# Patient Record
Sex: FEMALE | Race: WHITE | Employment: OTHER | URBAN - METROPOLITAN AREA
[De-identification: names, ages, dates, MRNs, and addresses within clinical notes are randomized per-mention and may not be internally consistent; named-entity substitution may affect disease eponyms.]

---

## 2017-01-06 ENCOUNTER — OFFICE VISIT (OUTPATIENT)
Dept: FAMILY MEDICINE CLINIC | Age: 72
End: 2017-01-06

## 2017-01-06 VITALS
BODY MASS INDEX: 20.61 KG/M2 | HEART RATE: 91 BPM | WEIGHT: 136 LBS | OXYGEN SATURATION: 98 % | HEIGHT: 68 IN | DIASTOLIC BLOOD PRESSURE: 62 MMHG | SYSTOLIC BLOOD PRESSURE: 102 MMHG

## 2017-01-06 DIAGNOSIS — H60.392 INFECTION OF EAR LOBE, LEFT: ICD-10-CM

## 2017-01-06 DIAGNOSIS — E78.00 HYPERCHOLESTEROLEMIA: ICD-10-CM

## 2017-01-06 DIAGNOSIS — M47.816 LUMBAR FACET ARTHROPATHY: ICD-10-CM

## 2017-01-06 DIAGNOSIS — R10.32 ABDOMINAL PAIN, LEFT LOWER QUADRANT: ICD-10-CM

## 2017-01-06 DIAGNOSIS — L91.8 ACROCHORDON: ICD-10-CM

## 2017-01-06 DIAGNOSIS — R10.10 PAIN OF UPPER ABDOMEN: Primary | ICD-10-CM

## 2017-01-06 DIAGNOSIS — E55.9 VITAMIN D DEFICIENCY: ICD-10-CM

## 2017-01-06 DIAGNOSIS — D18.01 CHERRY ANGIOMA: ICD-10-CM

## 2017-01-06 DIAGNOSIS — Z23 NEED FOR VACCINATION: ICD-10-CM

## 2017-01-06 DIAGNOSIS — R73.01 IMPAIRED FASTING GLUCOSE: ICD-10-CM

## 2017-01-06 DIAGNOSIS — M77.8 TENDONITIS OF FINGER: ICD-10-CM

## 2017-01-06 DIAGNOSIS — R19.4 CHANGE IN BOWEL HABIT: ICD-10-CM

## 2017-01-06 DIAGNOSIS — R10.10 PAIN OF UPPER ABDOMEN: ICD-10-CM

## 2017-01-06 LAB
A/G RATIO: 2.2 (ref 1.1–2.2)
ALBUMIN SERPL-MCNC: 4.9 G/DL (ref 3.4–5)
ALP BLD-CCNC: 63 U/L (ref 40–129)
ALT SERPL-CCNC: 14 U/L (ref 10–40)
ANION GAP SERPL CALCULATED.3IONS-SCNC: 18 MMOL/L (ref 3–16)
AST SERPL-CCNC: 19 U/L (ref 15–37)
BILIRUB SERPL-MCNC: 0.6 MG/DL (ref 0–1)
BUN BLDV-MCNC: 11 MG/DL (ref 7–20)
CALCIUM SERPL-MCNC: 9.8 MG/DL (ref 8.3–10.6)
CHLORIDE BLD-SCNC: 102 MMOL/L (ref 99–110)
CHOLESTEROL, TOTAL: 246 MG/DL (ref 0–199)
CO2: 25 MMOL/L (ref 21–32)
CREAT SERPL-MCNC: 0.6 MG/DL (ref 0.6–1.2)
GFR AFRICAN AMERICAN: >60
GFR NON-AFRICAN AMERICAN: >60
GLOBULIN: 2.2 G/DL
GLUCOSE BLD-MCNC: 107 MG/DL (ref 70–99)
HCT VFR BLD CALC: 41.4 % (ref 36–48)
HDLC SERPL-MCNC: 61 MG/DL (ref 40–60)
HEMOGLOBIN: 13.5 G/DL (ref 12–16)
LDL CHOLESTEROL CALCULATED: 150 MG/DL
MCH RBC QN AUTO: 30.3 PG (ref 26–34)
MCHC RBC AUTO-ENTMCNC: 32.7 G/DL (ref 31–36)
MCV RBC AUTO: 92.5 FL (ref 80–100)
PDW BLD-RTO: 13.8 % (ref 12.4–15.4)
PLATELET # BLD: 191 K/UL (ref 135–450)
PMV BLD AUTO: 7.6 FL (ref 5–10.5)
POTASSIUM SERPL-SCNC: 4.9 MMOL/L (ref 3.5–5.1)
RBC # BLD: 4.47 M/UL (ref 4–5.2)
SODIUM BLD-SCNC: 145 MMOL/L (ref 136–145)
TOTAL PROTEIN: 7.1 G/DL (ref 6.4–8.2)
TRIGL SERPL-MCNC: 176 MG/DL (ref 0–150)
VITAMIN D 25-HYDROXY: 50 NG/ML
VLDLC SERPL CALC-MCNC: 35 MG/DL
WBC # BLD: 4.3 K/UL (ref 4–11)

## 2017-01-06 PROCEDURE — 99214 OFFICE O/P EST MOD 30 MIN: CPT | Performed by: FAMILY MEDICINE

## 2017-01-06 PROCEDURE — G0008 ADMIN INFLUENZA VIRUS VAC: HCPCS | Performed by: FAMILY MEDICINE

## 2017-01-06 PROCEDURE — 90662 IIV NO PRSV INCREASED AG IM: CPT | Performed by: FAMILY MEDICINE

## 2017-01-06 RX ORDER — DICYCLOMINE HCL 20 MG
20 TABLET ORAL
Qty: 90 TABLET | Refills: 1 | Status: SHIPPED | OUTPATIENT
Start: 2017-01-06 | End: 2017-02-01 | Stop reason: ALTCHOICE

## 2017-01-06 ASSESSMENT — ENCOUNTER SYMPTOMS
EYE DISCHARGE: 0
DIARRHEA: 1
ABDOMINAL PAIN: 1
COLOR CHANGE: 1
SHORTNESS OF BREATH: 0
BACK PAIN: 1
NAUSEA: 1
EYE REDNESS: 0
BOWEL INCONTINENCE: 0

## 2017-01-13 ENCOUNTER — HOSPITAL ENCOUNTER (OUTPATIENT)
Dept: CT IMAGING | Age: 72
Discharge: HOME | End: 2017-01-13
Attending: FAMILY MEDICINE | Admitting: FAMILY MEDICINE

## 2017-01-13 DIAGNOSIS — R10.10 PAIN OF UPPER ABDOMEN: ICD-10-CM

## 2017-01-13 DIAGNOSIS — R10.32 ABDOMINAL PAIN, LEFT LOWER QUADRANT: ICD-10-CM

## 2017-01-13 DIAGNOSIS — R10.10 UPPER ABDOMINAL PAIN: ICD-10-CM

## 2017-01-13 DIAGNOSIS — R19.4 CHANGE IN BOWEL HABIT: ICD-10-CM

## 2017-01-13 RX ORDER — METRONIDAZOLE 500 MG/1
500 TABLET ORAL 3 TIMES DAILY
Qty: 30 TABLET | Refills: 0 | Status: SHIPPED | OUTPATIENT
Start: 2017-01-13 | End: 2017-01-23

## 2017-01-13 RX ORDER — CIPROFLOXACIN 500 MG/1
500 TABLET, FILM COATED ORAL 2 TIMES DAILY
Qty: 20 TABLET | Refills: 0 | Status: SHIPPED | OUTPATIENT
Start: 2017-01-13 | End: 2017-01-23

## 2017-02-01 ENCOUNTER — OFFICE VISIT (OUTPATIENT)
Dept: FAMILY MEDICINE CLINIC | Age: 72
End: 2017-02-01

## 2017-02-01 VITALS
HEART RATE: 85 BPM | OXYGEN SATURATION: 98 % | BODY MASS INDEX: 20.46 KG/M2 | DIASTOLIC BLOOD PRESSURE: 64 MMHG | HEIGHT: 68 IN | WEIGHT: 135 LBS | SYSTOLIC BLOOD PRESSURE: 104 MMHG

## 2017-02-01 DIAGNOSIS — K80.11: ICD-10-CM

## 2017-02-01 DIAGNOSIS — R10.32 LEFT LOWER QUADRANT PAIN: Primary | ICD-10-CM

## 2017-02-01 DIAGNOSIS — I70.90 ATHEROSCLEROSIS: ICD-10-CM

## 2017-02-01 DIAGNOSIS — K63.9 COLON WALL THICKENING: ICD-10-CM

## 2017-02-01 PROCEDURE — G8427 DOCREV CUR MEDS BY ELIG CLIN: HCPCS | Performed by: FAMILY MEDICINE

## 2017-02-01 PROCEDURE — 99214 OFFICE O/P EST MOD 30 MIN: CPT | Performed by: FAMILY MEDICINE

## 2017-02-01 PROCEDURE — G8400 PT W/DXA NO RESULTS DOC: HCPCS | Performed by: FAMILY MEDICINE

## 2017-02-01 PROCEDURE — 1090F PRES/ABSN URINE INCON ASSESS: CPT | Performed by: FAMILY MEDICINE

## 2017-02-01 PROCEDURE — 3017F COLORECTAL CA SCREEN DOC REV: CPT | Performed by: FAMILY MEDICINE

## 2017-02-01 PROCEDURE — 1036F TOBACCO NON-USER: CPT | Performed by: FAMILY MEDICINE

## 2017-02-01 PROCEDURE — 1123F ACP DISCUSS/DSCN MKR DOCD: CPT | Performed by: FAMILY MEDICINE

## 2017-02-01 PROCEDURE — G8484 FLU IMMUNIZE NO ADMIN: HCPCS | Performed by: FAMILY MEDICINE

## 2017-02-01 PROCEDURE — 3014F SCREEN MAMMO DOC REV: CPT | Performed by: FAMILY MEDICINE

## 2017-02-01 PROCEDURE — G8419 CALC BMI OUT NRM PARAM NOF/U: HCPCS | Performed by: FAMILY MEDICINE

## 2017-02-01 PROCEDURE — 4040F PNEUMOC VAC/ADMIN/RCVD: CPT | Performed by: FAMILY MEDICINE

## 2017-02-01 RX ORDER — DICYCLOMINE HCL 20 MG
20 TABLET ORAL
Qty: 90 TABLET | Refills: 1 | Status: SHIPPED | OUTPATIENT
Start: 2017-02-01 | End: 2017-03-16 | Stop reason: ALTCHOICE

## 2017-02-01 ASSESSMENT — ENCOUNTER SYMPTOMS
COLOR CHANGE: 0
SHORTNESS OF BREATH: 0
CONSTIPATION: 0
ABDOMINAL PAIN: 1
DIARRHEA: 1
EYE REDNESS: 0
BLOOD IN STOOL: 0
VOMITING: 0
NAUSEA: 0
ABDOMINAL DISTENTION: 0
RECTAL PAIN: 0
EYE DISCHARGE: 0

## 2017-02-21 ENCOUNTER — HOSPITAL ENCOUNTER (OUTPATIENT)
Dept: SURGERY | Age: 72
Discharge: HOME | End: 2017-02-21
Attending: INTERNAL MEDICINE | Admitting: INTERNAL MEDICINE

## 2017-02-21 VITALS
OXYGEN SATURATION: 99 % | SYSTOLIC BLOOD PRESSURE: 110 MMHG | TEMPERATURE: 97.4 F | DIASTOLIC BLOOD PRESSURE: 61 MMHG | BODY MASS INDEX: 20.46 KG/M2 | WEIGHT: 135 LBS | RESPIRATION RATE: 16 BRPM | HEART RATE: 109 BPM | HEIGHT: 68 IN

## 2017-02-21 DIAGNOSIS — R19.5 OTHER FECAL ABNORMALITIES: ICD-10-CM

## 2017-02-21 LAB
GLUCOSE BLD-MCNC: 95 MG/DL (ref 70–99)
PERFORMED ON: NORMAL

## 2017-02-21 PROCEDURE — 93010 ELECTROCARDIOGRAM REPORT: CPT | Performed by: INTERNAL MEDICINE

## 2017-02-21 RX ORDER — SODIUM CHLORIDE, SODIUM LACTATE, POTASSIUM CHLORIDE, CALCIUM CHLORIDE 600; 310; 30; 20 MG/100ML; MG/100ML; MG/100ML; MG/100ML
INJECTION, SOLUTION INTRAVENOUS CONTINUOUS
Status: DISCONTINUED | OUTPATIENT
Start: 2017-02-21 | End: 2017-02-22 | Stop reason: HOSPADM

## 2017-02-21 RX ORDER — LIDOCAINE HYDROCHLORIDE 10 MG/ML
0.1 INJECTION, SOLUTION EPIDURAL; INFILTRATION; INTRACAUDAL; PERINEURAL ONCE
Status: DISCONTINUED | OUTPATIENT
Start: 2017-02-21 | End: 2017-02-22 | Stop reason: HOSPADM

## 2017-02-21 RX ADMIN — SODIUM CHLORIDE, SODIUM LACTATE, POTASSIUM CHLORIDE, CALCIUM CHLORIDE: 600; 310; 30; 20 INJECTION, SOLUTION INTRAVENOUS at 12:11

## 2017-02-21 ASSESSMENT — PAIN SCALES - GENERAL
PAINLEVEL_OUTOF10: 0

## 2017-02-21 ASSESSMENT — PAIN - FUNCTIONAL ASSESSMENT: PAIN_FUNCTIONAL_ASSESSMENT: 0-10

## 2017-02-22 LAB
EKG ATRIAL RATE: 107 BPM
EKG DIAGNOSIS: NORMAL
EKG P AXIS: 19 DEGREES
EKG P-R INTERVAL: 122 MS
EKG Q-T INTERVAL: 350 MS
EKG QRS DURATION: 68 MS
EKG QTC CALCULATION (BAZETT): 467 MS
EKG R AXIS: 6 DEGREES
EKG T AXIS: 1 DEGREES
EKG VENTRICULAR RATE: 107 BPM

## 2017-03-09 ENCOUNTER — HOSPITAL ENCOUNTER (OUTPATIENT)
Dept: NUCLEAR MEDICINE | Age: 72
Discharge: HOME | End: 2017-03-09
Attending: INTERNAL MEDICINE | Admitting: INTERNAL MEDICINE

## 2017-03-09 VITALS — WEIGHT: 129 LBS | BODY MASS INDEX: 20.25 KG/M2 | HEIGHT: 67 IN

## 2017-03-09 DIAGNOSIS — K80.20 CALCULUS OF GALLBLADDER WITHOUT CHOLECYSTITIS WITHOUT OBSTRUCTION: ICD-10-CM

## 2017-03-09 DIAGNOSIS — R11.0 NAUSEA: ICD-10-CM

## 2017-03-09 DIAGNOSIS — R63.4 WEIGHT LOSS: ICD-10-CM

## 2017-03-09 RX ADMIN — Medication 6 MILLICURIE: at 08:15

## 2017-03-16 ENCOUNTER — SURG/PROC ORDERS (OUTPATIENT)
Dept: SURGERY | Age: 72
End: 2017-03-16

## 2017-03-16 ENCOUNTER — INITIAL CONSULT (OUTPATIENT)
Dept: SURGERY | Age: 72
End: 2017-03-16

## 2017-03-16 VITALS
WEIGHT: 133 LBS | SYSTOLIC BLOOD PRESSURE: 136 MMHG | BODY MASS INDEX: 20.16 KG/M2 | HEIGHT: 68 IN | DIASTOLIC BLOOD PRESSURE: 80 MMHG

## 2017-03-16 DIAGNOSIS — K80.10 CALCULUS OF GALLBLADDER WITH CHRONIC CHOLECYSTITIS WITHOUT OBSTRUCTION: Primary | ICD-10-CM

## 2017-03-16 PROCEDURE — G8484 FLU IMMUNIZE NO ADMIN: HCPCS | Performed by: SURGERY

## 2017-03-16 PROCEDURE — 1090F PRES/ABSN URINE INCON ASSESS: CPT | Performed by: SURGERY

## 2017-03-16 PROCEDURE — G8427 DOCREV CUR MEDS BY ELIG CLIN: HCPCS | Performed by: SURGERY

## 2017-03-16 PROCEDURE — G8419 CALC BMI OUT NRM PARAM NOF/U: HCPCS | Performed by: SURGERY

## 2017-03-16 PROCEDURE — 1036F TOBACCO NON-USER: CPT | Performed by: SURGERY

## 2017-03-16 PROCEDURE — G8400 PT W/DXA NO RESULTS DOC: HCPCS | Performed by: SURGERY

## 2017-03-16 PROCEDURE — 3017F COLORECTAL CA SCREEN DOC REV: CPT | Performed by: SURGERY

## 2017-03-16 PROCEDURE — 99203 OFFICE O/P NEW LOW 30 MIN: CPT | Performed by: SURGERY

## 2017-03-16 PROCEDURE — 1123F ACP DISCUSS/DSCN MKR DOCD: CPT | Performed by: SURGERY

## 2017-03-16 PROCEDURE — 4040F PNEUMOC VAC/ADMIN/RCVD: CPT | Performed by: SURGERY

## 2017-03-16 PROCEDURE — 3014F SCREEN MAMMO DOC REV: CPT | Performed by: SURGERY

## 2017-03-16 RX ORDER — ONDANSETRON 4 MG/1
4 TABLET, FILM COATED ORAL EVERY 6 HOURS PRN
Qty: 40 TABLET | Refills: 1 | Status: SHIPPED | OUTPATIENT
Start: 2017-03-16 | End: 2017-03-16 | Stop reason: ALTCHOICE

## 2017-03-16 RX ORDER — SODIUM CHLORIDE 0.9 % (FLUSH) 0.9 %
10 SYRINGE (ML) INJECTION EVERY 12 HOURS SCHEDULED
Status: CANCELLED | OUTPATIENT
Start: 2017-03-16

## 2017-03-16 RX ORDER — SODIUM CHLORIDE 0.9 % (FLUSH) 0.9 %
10 SYRINGE (ML) INJECTION PRN
Status: CANCELLED | OUTPATIENT
Start: 2017-03-16

## 2017-03-16 RX ORDER — HEPARIN SODIUM 5000 [USP'U]/ML
5000 INJECTION, SOLUTION INTRAVENOUS; SUBCUTANEOUS ONCE
Status: CANCELLED | OUTPATIENT
Start: 2017-03-16

## 2017-03-17 ENCOUNTER — HOSPITAL ENCOUNTER (OUTPATIENT)
Dept: SURGERY | Age: 72
Discharge: HOME | End: 2017-03-17
Attending: SURGERY | Admitting: SURGERY

## 2017-03-17 VITALS
RESPIRATION RATE: 15 BRPM | SYSTOLIC BLOOD PRESSURE: 136 MMHG | DIASTOLIC BLOOD PRESSURE: 71 MMHG | OXYGEN SATURATION: 97 % | HEART RATE: 88 BPM | TEMPERATURE: 97 F

## 2017-03-17 PROCEDURE — 47562 LAPAROSCOPIC CHOLECYSTECTOMY: CPT | Performed by: SURGERY

## 2017-03-17 RX ORDER — DIPHENHYDRAMINE HYDROCHLORIDE 50 MG/ML
12.5 INJECTION INTRAMUSCULAR; INTRAVENOUS
Status: ACTIVE | OUTPATIENT
Start: 2017-03-17 | End: 2017-03-17

## 2017-03-17 RX ORDER — MORPHINE SULFATE 4 MG/ML
1 INJECTION, SOLUTION INTRAMUSCULAR; INTRAVENOUS EVERY 5 MIN PRN
Status: DISCONTINUED | OUTPATIENT
Start: 2017-03-17 | End: 2017-03-18 | Stop reason: HOSPADM

## 2017-03-17 RX ORDER — OXYCODONE HYDROCHLORIDE AND ACETAMINOPHEN 5; 325 MG/1; MG/1
1 TABLET ORAL PRN
Status: COMPLETED | OUTPATIENT
Start: 2017-03-17 | End: 2017-03-17

## 2017-03-17 RX ORDER — MORPHINE SULFATE 4 MG/ML
2 INJECTION, SOLUTION INTRAMUSCULAR; INTRAVENOUS EVERY 5 MIN PRN
Status: DISCONTINUED | OUTPATIENT
Start: 2017-03-17 | End: 2017-03-18 | Stop reason: HOSPADM

## 2017-03-17 RX ORDER — SODIUM CHLORIDE 0.9 % (FLUSH) 0.9 %
10 SYRINGE (ML) INJECTION PRN
Status: DISCONTINUED | OUTPATIENT
Start: 2017-03-17 | End: 2017-03-17 | Stop reason: SDUPTHER

## 2017-03-17 RX ORDER — LABETALOL HYDROCHLORIDE 5 MG/ML
5 INJECTION, SOLUTION INTRAVENOUS EVERY 10 MIN PRN
Status: DISCONTINUED | OUTPATIENT
Start: 2017-03-17 | End: 2017-03-18 | Stop reason: HOSPADM

## 2017-03-17 RX ORDER — SODIUM CHLORIDE, SODIUM LACTATE, POTASSIUM CHLORIDE, CALCIUM CHLORIDE 600; 310; 30; 20 MG/100ML; MG/100ML; MG/100ML; MG/100ML
INJECTION, SOLUTION INTRAVENOUS CONTINUOUS
Status: DISCONTINUED | OUTPATIENT
Start: 2017-03-17 | End: 2017-03-18 | Stop reason: HOSPADM

## 2017-03-17 RX ORDER — OXYCODONE HYDROCHLORIDE 5 MG/1
5 TABLET ORAL EVERY 4 HOURS PRN
Qty: 30 TABLET | Refills: 0 | Status: SHIPPED | OUTPATIENT
Start: 2017-03-17 | End: 2017-04-04 | Stop reason: ALTCHOICE

## 2017-03-17 RX ORDER — OXYCODONE HYDROCHLORIDE AND ACETAMINOPHEN 5; 325 MG/1; MG/1
2 TABLET ORAL PRN
Status: COMPLETED | OUTPATIENT
Start: 2017-03-17 | End: 2017-03-17

## 2017-03-17 RX ORDER — HEPARIN SODIUM 5000 [USP'U]/ML
5000 INJECTION, SOLUTION INTRAVENOUS; SUBCUTANEOUS ONCE
Status: COMPLETED | OUTPATIENT
Start: 2017-03-17 | End: 2017-03-17

## 2017-03-17 RX ORDER — HYDROMORPHONE HCL 110MG/55ML
0.25 PATIENT CONTROLLED ANALGESIA SYRINGE INTRAVENOUS EVERY 5 MIN PRN
Status: DISCONTINUED | OUTPATIENT
Start: 2017-03-17 | End: 2017-03-18 | Stop reason: HOSPADM

## 2017-03-17 RX ORDER — SODIUM CHLORIDE 0.9 % (FLUSH) 0.9 %
10 SYRINGE (ML) INJECTION EVERY 12 HOURS SCHEDULED
Status: DISCONTINUED | OUTPATIENT
Start: 2017-03-17 | End: 2017-03-17 | Stop reason: SDUPTHER

## 2017-03-17 RX ORDER — SODIUM CHLORIDE 0.9 % (FLUSH) 0.9 %
10 SYRINGE (ML) INJECTION EVERY 12 HOURS SCHEDULED
Status: DISCONTINUED | OUTPATIENT
Start: 2017-03-17 | End: 2017-03-18 | Stop reason: HOSPADM

## 2017-03-17 RX ORDER — HYDRALAZINE HYDROCHLORIDE 20 MG/ML
5 INJECTION INTRAMUSCULAR; INTRAVENOUS EVERY 10 MIN PRN
Status: DISCONTINUED | OUTPATIENT
Start: 2017-03-17 | End: 2017-03-18 | Stop reason: HOSPADM

## 2017-03-17 RX ORDER — ONDANSETRON 2 MG/ML
4 INJECTION INTRAMUSCULAR; INTRAVENOUS
Status: COMPLETED | OUTPATIENT
Start: 2017-03-17 | End: 2017-03-17

## 2017-03-17 RX ORDER — LIDOCAINE HYDROCHLORIDE 10 MG/ML
1 INJECTION, SOLUTION EPIDURAL; INFILTRATION; INTRACAUDAL; PERINEURAL
Status: DISPENSED | OUTPATIENT
Start: 2017-03-17 | End: 2017-03-17

## 2017-03-17 RX ORDER — HYDROMORPHONE HCL 110MG/55ML
0.5 PATIENT CONTROLLED ANALGESIA SYRINGE INTRAVENOUS EVERY 5 MIN PRN
Status: DISCONTINUED | OUTPATIENT
Start: 2017-03-17 | End: 2017-03-18 | Stop reason: HOSPADM

## 2017-03-17 RX ORDER — MEPERIDINE HYDROCHLORIDE 25 MG/ML
12.5 INJECTION INTRAMUSCULAR; INTRAVENOUS; SUBCUTANEOUS EVERY 5 MIN PRN
Status: DISCONTINUED | OUTPATIENT
Start: 2017-03-17 | End: 2017-03-18 | Stop reason: HOSPADM

## 2017-03-17 RX ORDER — SODIUM CHLORIDE 0.9 % (FLUSH) 0.9 %
10 SYRINGE (ML) INJECTION PRN
Status: DISCONTINUED | OUTPATIENT
Start: 2017-03-17 | End: 2017-03-18 | Stop reason: HOSPADM

## 2017-03-17 RX ADMIN — OXYCODONE HYDROCHLORIDE AND ACETAMINOPHEN 2 TABLET: 5; 325 TABLET ORAL at 13:12

## 2017-03-17 RX ADMIN — Medication 0.5 MG: at 12:30

## 2017-03-17 RX ADMIN — MEPERIDINE HYDROCHLORIDE 12.5 MG: 25 INJECTION INTRAMUSCULAR; INTRAVENOUS; SUBCUTANEOUS at 12:30

## 2017-03-17 RX ADMIN — HEPARIN SODIUM 5000 UNITS: 5000 INJECTION, SOLUTION INTRAVENOUS; SUBCUTANEOUS at 10:37

## 2017-03-17 RX ADMIN — MEPERIDINE HYDROCHLORIDE 12.5 MG: 25 INJECTION INTRAMUSCULAR; INTRAVENOUS; SUBCUTANEOUS at 12:40

## 2017-03-17 RX ADMIN — ONDANSETRON 4 MG: 2 INJECTION INTRAMUSCULAR; INTRAVENOUS at 12:30

## 2017-03-17 ASSESSMENT — PAIN SCALES - GENERAL
PAINLEVEL_OUTOF10: 0
PAINLEVEL_OUTOF10: 5
PAINLEVEL_OUTOF10: 0

## 2017-03-17 ASSESSMENT — PAIN - FUNCTIONAL ASSESSMENT: PAIN_FUNCTIONAL_ASSESSMENT: 0-10

## 2017-04-04 ENCOUNTER — OFFICE VISIT (OUTPATIENT)
Dept: SURGERY | Age: 72
End: 2017-04-04

## 2017-04-04 VITALS
HEIGHT: 68 IN | BODY MASS INDEX: 19.55 KG/M2 | DIASTOLIC BLOOD PRESSURE: 72 MMHG | WEIGHT: 129 LBS | SYSTOLIC BLOOD PRESSURE: 128 MMHG

## 2017-04-04 DIAGNOSIS — Z98.890 POST-OPERATIVE STATE: Primary | ICD-10-CM

## 2017-04-04 PROCEDURE — 99024 POSTOP FOLLOW-UP VISIT: CPT | Performed by: SURGERY

## 2017-06-28 ENCOUNTER — OFFICE VISIT (OUTPATIENT)
Dept: FAMILY MEDICINE CLINIC | Age: 72
End: 2017-06-28

## 2017-06-28 VITALS
OXYGEN SATURATION: 94 % | WEIGHT: 126 LBS | BODY MASS INDEX: 17.64 KG/M2 | HEART RATE: 91 BPM | SYSTOLIC BLOOD PRESSURE: 112 MMHG | HEIGHT: 71 IN | DIASTOLIC BLOOD PRESSURE: 60 MMHG

## 2017-06-28 DIAGNOSIS — E78.00 HYPERCHOLESTEROLEMIA: ICD-10-CM

## 2017-06-28 DIAGNOSIS — Z13.9 SCREENING: ICD-10-CM

## 2017-06-28 DIAGNOSIS — R73.01 IMPAIRED FASTING GLUCOSE: ICD-10-CM

## 2017-06-28 DIAGNOSIS — R73.01 IMPAIRED FASTING GLUCOSE: Primary | ICD-10-CM

## 2017-06-28 DIAGNOSIS — E55.9 VITAMIN D DEFICIENCY: ICD-10-CM

## 2017-06-28 LAB
A/G RATIO: 2.3 (ref 1.1–2.2)
ALBUMIN SERPL-MCNC: 5 G/DL (ref 3.4–5)
ALP BLD-CCNC: 61 U/L (ref 40–129)
ALT SERPL-CCNC: 17 U/L (ref 10–40)
ANION GAP SERPL CALCULATED.3IONS-SCNC: 16 MMOL/L (ref 3–16)
AST SERPL-CCNC: 21 U/L (ref 15–37)
BILIRUB SERPL-MCNC: 0.7 MG/DL (ref 0–1)
BUN BLDV-MCNC: 16 MG/DL (ref 7–20)
CALCIUM SERPL-MCNC: 9.9 MG/DL (ref 8.3–10.6)
CHLORIDE BLD-SCNC: 103 MMOL/L (ref 99–110)
CHOLESTEROL, TOTAL: 214 MG/DL (ref 0–199)
CO2: 25 MMOL/L (ref 21–32)
CREAT SERPL-MCNC: 0.6 MG/DL (ref 0.6–1.2)
GFR AFRICAN AMERICAN: >60
GFR NON-AFRICAN AMERICAN: >60
GLOBULIN: 2.2 G/DL
GLUCOSE BLD-MCNC: 100 MG/DL (ref 70–99)
HCT VFR BLD CALC: 41.2 % (ref 36–48)
HDLC SERPL-MCNC: 74 MG/DL (ref 40–60)
HEMOGLOBIN: 13.7 G/DL (ref 12–16)
HEPATITIS C ANTIBODY INTERPRETATION: NORMAL
LDL CHOLESTEROL CALCULATED: 117 MG/DL
MCH RBC QN AUTO: 30.5 PG (ref 26–34)
MCHC RBC AUTO-ENTMCNC: 33.2 G/DL (ref 31–36)
MCV RBC AUTO: 91.9 FL (ref 80–100)
PDW BLD-RTO: 14.2 % (ref 12.4–15.4)
PLATELET # BLD: 197 K/UL (ref 135–450)
PMV BLD AUTO: 7.8 FL (ref 5–10.5)
POTASSIUM SERPL-SCNC: 4.5 MMOL/L (ref 3.5–5.1)
RBC # BLD: 4.48 M/UL (ref 4–5.2)
SODIUM BLD-SCNC: 144 MMOL/L (ref 136–145)
TOTAL PROTEIN: 7.2 G/DL (ref 6.4–8.2)
TRIGL SERPL-MCNC: 115 MG/DL (ref 0–150)
VITAMIN D 25-HYDROXY: 51.1 NG/ML
VLDLC SERPL CALC-MCNC: 23 MG/DL
WBC # BLD: 5.1 K/UL (ref 4–11)

## 2017-06-28 PROCEDURE — 1036F TOBACCO NON-USER: CPT | Performed by: FAMILY MEDICINE

## 2017-06-28 PROCEDURE — 1123F ACP DISCUSS/DSCN MKR DOCD: CPT | Performed by: FAMILY MEDICINE

## 2017-06-28 PROCEDURE — 3014F SCREEN MAMMO DOC REV: CPT | Performed by: FAMILY MEDICINE

## 2017-06-28 PROCEDURE — 99214 OFFICE O/P EST MOD 30 MIN: CPT | Performed by: FAMILY MEDICINE

## 2017-06-28 PROCEDURE — G8427 DOCREV CUR MEDS BY ELIG CLIN: HCPCS | Performed by: FAMILY MEDICINE

## 2017-06-28 PROCEDURE — 1090F PRES/ABSN URINE INCON ASSESS: CPT | Performed by: FAMILY MEDICINE

## 2017-06-28 PROCEDURE — G8419 CALC BMI OUT NRM PARAM NOF/U: HCPCS | Performed by: FAMILY MEDICINE

## 2017-06-28 PROCEDURE — G8400 PT W/DXA NO RESULTS DOC: HCPCS | Performed by: FAMILY MEDICINE

## 2017-06-28 PROCEDURE — 3017F COLORECTAL CA SCREEN DOC REV: CPT | Performed by: FAMILY MEDICINE

## 2017-06-28 PROCEDURE — 4040F PNEUMOC VAC/ADMIN/RCVD: CPT | Performed by: FAMILY MEDICINE

## 2017-06-28 ASSESSMENT — ENCOUNTER SYMPTOMS
EYE REDNESS: 0
NAUSEA: 0
ABDOMINAL PAIN: 0
COLOR CHANGE: 0
SHORTNESS OF BREATH: 0
EYE DISCHARGE: 0

## 2018-01-09 ENCOUNTER — OFFICE VISIT (OUTPATIENT)
Dept: FAMILY MEDICINE CLINIC | Age: 73
End: 2018-01-09

## 2018-01-09 VITALS
DIASTOLIC BLOOD PRESSURE: 68 MMHG | SYSTOLIC BLOOD PRESSURE: 104 MMHG | HEART RATE: 87 BPM | WEIGHT: 130 LBS | BODY MASS INDEX: 19.7 KG/M2 | OXYGEN SATURATION: 98 % | HEIGHT: 68 IN

## 2018-01-09 DIAGNOSIS — R73.01 IMPAIRED FASTING GLUCOSE: ICD-10-CM

## 2018-01-09 DIAGNOSIS — E78.00 HYPERCHOLESTEROLEMIA: ICD-10-CM

## 2018-01-09 DIAGNOSIS — E55.9 VITAMIN D DEFICIENCY: Primary | ICD-10-CM

## 2018-01-09 DIAGNOSIS — Z80.0 FAMILY HISTORY OF LIVER CANCER: ICD-10-CM

## 2018-01-09 DIAGNOSIS — Z83.49 FAMILY HISTORY OF GRAVES' DISEASE: ICD-10-CM

## 2018-01-09 DIAGNOSIS — E55.9 VITAMIN D DEFICIENCY: ICD-10-CM

## 2018-01-09 LAB
A/G RATIO: 2.5 (ref 1.1–2.2)
ALBUMIN SERPL-MCNC: 4.9 G/DL (ref 3.4–5)
ALP BLD-CCNC: 67 U/L (ref 40–129)
ALT SERPL-CCNC: 18 U/L (ref 10–40)
ANION GAP SERPL CALCULATED.3IONS-SCNC: 14 MMOL/L (ref 3–16)
AST SERPL-CCNC: 20 U/L (ref 15–37)
BILIRUB SERPL-MCNC: 0.7 MG/DL (ref 0–1)
BUN BLDV-MCNC: 14 MG/DL (ref 7–20)
CALCIUM SERPL-MCNC: 9.6 MG/DL (ref 8.3–10.6)
CHLORIDE BLD-SCNC: 103 MMOL/L (ref 99–110)
CHOLESTEROL, TOTAL: 237 MG/DL (ref 0–199)
CO2: 27 MMOL/L (ref 21–32)
CREAT SERPL-MCNC: 0.7 MG/DL (ref 0.6–1.2)
GFR AFRICAN AMERICAN: >60
GFR NON-AFRICAN AMERICAN: >60
GLOBULIN: 2 G/DL
GLUCOSE BLD-MCNC: 93 MG/DL (ref 70–99)
HCT VFR BLD CALC: 40.2 % (ref 36–48)
HDLC SERPL-MCNC: 78 MG/DL (ref 40–60)
HEMOGLOBIN: 13.4 G/DL (ref 12–16)
LDL CHOLESTEROL CALCULATED: 139 MG/DL
MCH RBC QN AUTO: 30.5 PG (ref 26–34)
MCHC RBC AUTO-ENTMCNC: 33.3 G/DL (ref 31–36)
MCV RBC AUTO: 91.8 FL (ref 80–100)
PDW BLD-RTO: 14 % (ref 12.4–15.4)
PLATELET # BLD: 201 K/UL (ref 135–450)
PMV BLD AUTO: 7.7 FL (ref 5–10.5)
POTASSIUM SERPL-SCNC: 4.3 MMOL/L (ref 3.5–5.1)
RBC # BLD: 4.38 M/UL (ref 4–5.2)
SODIUM BLD-SCNC: 144 MMOL/L (ref 136–145)
TOTAL PROTEIN: 6.9 G/DL (ref 6.4–8.2)
TRIGL SERPL-MCNC: 101 MG/DL (ref 0–150)
VLDLC SERPL CALC-MCNC: 20 MG/DL
WBC # BLD: 6.2 K/UL (ref 4–11)

## 2018-01-09 PROCEDURE — G8427 DOCREV CUR MEDS BY ELIG CLIN: HCPCS | Performed by: FAMILY MEDICINE

## 2018-01-09 PROCEDURE — 1090F PRES/ABSN URINE INCON ASSESS: CPT | Performed by: FAMILY MEDICINE

## 2018-01-09 PROCEDURE — 99214 OFFICE O/P EST MOD 30 MIN: CPT | Performed by: FAMILY MEDICINE

## 2018-01-09 PROCEDURE — 1036F TOBACCO NON-USER: CPT | Performed by: FAMILY MEDICINE

## 2018-01-09 PROCEDURE — 4040F PNEUMOC VAC/ADMIN/RCVD: CPT | Performed by: FAMILY MEDICINE

## 2018-01-09 PROCEDURE — 90662 IIV NO PRSV INCREASED AG IM: CPT | Performed by: FAMILY MEDICINE

## 2018-01-09 PROCEDURE — 3014F SCREEN MAMMO DOC REV: CPT | Performed by: FAMILY MEDICINE

## 2018-01-09 PROCEDURE — 3017F COLORECTAL CA SCREEN DOC REV: CPT | Performed by: FAMILY MEDICINE

## 2018-01-09 PROCEDURE — 1123F ACP DISCUSS/DSCN MKR DOCD: CPT | Performed by: FAMILY MEDICINE

## 2018-01-09 PROCEDURE — G8420 CALC BMI NORM PARAMETERS: HCPCS | Performed by: FAMILY MEDICINE

## 2018-01-09 PROCEDURE — G8484 FLU IMMUNIZE NO ADMIN: HCPCS | Performed by: FAMILY MEDICINE

## 2018-01-09 PROCEDURE — G0008 ADMIN INFLUENZA VIRUS VAC: HCPCS | Performed by: FAMILY MEDICINE

## 2018-01-09 PROCEDURE — G8400 PT W/DXA NO RESULTS DOC: HCPCS | Performed by: FAMILY MEDICINE

## 2018-01-09 RX ORDER — PHENOL 1.4 %
1 AEROSOL, SPRAY (ML) MUCOUS MEMBRANE DAILY
COMMUNITY

## 2018-01-09 ASSESSMENT — PATIENT HEALTH QUESTIONNAIRE - PHQ9
SUM OF ALL RESPONSES TO PHQ QUESTIONS 1-9: 0
2. FEELING DOWN, DEPRESSED OR HOPELESS: 0
SUM OF ALL RESPONSES TO PHQ9 QUESTIONS 1 & 2: 0
1. LITTLE INTEREST OR PLEASURE IN DOING THINGS: 0

## 2018-01-09 ASSESSMENT — ENCOUNTER SYMPTOMS
ABDOMINAL PAIN: 0
NAUSEA: 0
COLOR CHANGE: 0
EYE DISCHARGE: 0
SHORTNESS OF BREATH: 0
EYE REDNESS: 0

## 2018-01-09 NOTE — PROGRESS NOTES
Subjective:      Patient ID: Braulio Membreno is a 81 y.o. female. She is here for recheck on her vitamin D deficiency, hypercholesterolemia and impaired fasting glucose., and GERD She has been watching her diet except for the holidays. She is taking her medication, vitamin D and Fosamax, as prescribed and tolerating it well. Her GERD is much improved except when she eats tomatoes. She is taking her medication as prescribed and his symptoms improved since her gallbladder surgery. She is controlling her cholesterol with diet. She is controlling her blood sugar with diet as well. She is due for recheck labs on both of those conditions today. She has recently found out that her sister who has Graves' disease, has liver cancer as well. Past Medical History:   Diagnosis Date    Anxiety 2/6/2013    Dyslipidemia 05/03/2013    Fibromyalgia     GERD (gastroesophageal reflux disease)     Hypercholesterolemia 6/11/2014    Osteopenia     Pre-diabetes 05/03/2013     Social History     Social History    Marital status:      Spouse name: N/A    Number of children: N/A    Years of education: N/A     Occupational History    Not on file. Social History Main Topics    Smoking status: Never Smoker    Smokeless tobacco: Never Used      Comment: smoked for 2 months after high school    Alcohol use No    Drug use: No    Sexual activity: Yes     Other Topics Concern    Not on file     Social History Narrative    No narrative on file     Outpatient Prescriptions Marked as Taking for the 1/9/18 encounter (Office Visit) with Micah Tayla, DO   Medication Sig Dispense Refill    calcium carbonate 600 MG TABS tablet Take 1 tablet by mouth daily      alendronate (FOSAMAX) 70 MG tablet TAKE ONE TABLET BY MOUTH ONCE EVERY 7 DAYS 12 tablet 3    Biotin 5000 MCG TABS Take by mouth daily      esomeprazole Magnesium (NEXIUM) 20 MG PACK Take 20 mg by mouth daily.       Cholecalciferol (VITAMIN D) 2000 UNITS CAPS

## 2018-01-10 LAB — VITAMIN D 25-HYDROXY: 60.6 NG/ML

## 2018-04-03 ENCOUNTER — HOSPITAL ENCOUNTER (OUTPATIENT)
Dept: MAMMOGRAPHY | Age: 73
Discharge: HOME | End: 2018-04-03
Admitting: FAMILY MEDICINE

## 2018-04-03 DIAGNOSIS — Z12.31 ENCOUNTER FOR SCREENING MAMMOGRAM FOR BREAST CANCER: ICD-10-CM

## 2018-04-10 ENCOUNTER — OFFICE VISIT (OUTPATIENT)
Dept: FAMILY MEDICINE CLINIC | Age: 73
End: 2018-04-10

## 2018-04-10 VITALS
HEIGHT: 68 IN | DIASTOLIC BLOOD PRESSURE: 70 MMHG | WEIGHT: 130 LBS | BODY MASS INDEX: 19.7 KG/M2 | HEART RATE: 79 BPM | OXYGEN SATURATION: 92 % | SYSTOLIC BLOOD PRESSURE: 113 MMHG

## 2018-04-10 DIAGNOSIS — Z00.00 ROUTINE GENERAL MEDICAL EXAMINATION AT A HEALTH CARE FACILITY: Primary | ICD-10-CM

## 2018-04-10 PROCEDURE — G0438 PPPS, INITIAL VISIT: HCPCS | Performed by: FAMILY MEDICINE

## 2018-04-10 PROCEDURE — 4040F PNEUMOC VAC/ADMIN/RCVD: CPT | Performed by: FAMILY MEDICINE

## 2018-04-10 ASSESSMENT — PATIENT HEALTH QUESTIONNAIRE - PHQ9: SUM OF ALL RESPONSES TO PHQ QUESTIONS 1-9: 2

## 2018-04-10 ASSESSMENT — LIFESTYLE VARIABLES: HOW OFTEN DO YOU HAVE A DRINK CONTAINING ALCOHOL: 0

## 2018-04-10 ASSESSMENT — ANXIETY QUESTIONNAIRES: GAD7 TOTAL SCORE: 5

## 2018-04-10 ASSESSMENT — ENCOUNTER SYMPTOMS: ORTHOPNEA: 0

## 2018-06-19 ENCOUNTER — HOSPITAL ENCOUNTER (OUTPATIENT)
Dept: GENERAL RADIOLOGY | Age: 73
Discharge: HOME | End: 2018-06-19

## 2018-06-19 ENCOUNTER — OFFICE VISIT (OUTPATIENT)
Dept: FAMILY MEDICINE CLINIC | Age: 73
End: 2018-06-19

## 2018-06-19 VITALS
OXYGEN SATURATION: 98 % | HEIGHT: 68 IN | HEART RATE: 96 BPM | SYSTOLIC BLOOD PRESSURE: 114 MMHG | DIASTOLIC BLOOD PRESSURE: 69 MMHG

## 2018-06-19 DIAGNOSIS — R06.00 DYSPNEA, UNSPECIFIED TYPE: Primary | ICD-10-CM

## 2018-06-19 DIAGNOSIS — R06.00 DYSPNEA, UNSPECIFIED TYPE: ICD-10-CM

## 2018-06-19 PROCEDURE — G8420 CALC BMI NORM PARAMETERS: HCPCS | Performed by: FAMILY MEDICINE

## 2018-06-19 PROCEDURE — 1090F PRES/ABSN URINE INCON ASSESS: CPT | Performed by: FAMILY MEDICINE

## 2018-06-19 PROCEDURE — 1036F TOBACCO NON-USER: CPT | Performed by: FAMILY MEDICINE

## 2018-06-19 PROCEDURE — G8400 PT W/DXA NO RESULTS DOC: HCPCS | Performed by: FAMILY MEDICINE

## 2018-06-19 PROCEDURE — 4040F PNEUMOC VAC/ADMIN/RCVD: CPT | Performed by: FAMILY MEDICINE

## 2018-06-19 PROCEDURE — 99214 OFFICE O/P EST MOD 30 MIN: CPT | Performed by: FAMILY MEDICINE

## 2018-06-19 PROCEDURE — 3017F COLORECTAL CA SCREEN DOC REV: CPT | Performed by: FAMILY MEDICINE

## 2018-06-19 PROCEDURE — 1123F ACP DISCUSS/DSCN MKR DOCD: CPT | Performed by: FAMILY MEDICINE

## 2018-06-19 PROCEDURE — G8427 DOCREV CUR MEDS BY ELIG CLIN: HCPCS | Performed by: FAMILY MEDICINE

## 2018-06-19 ASSESSMENT — ENCOUNTER SYMPTOMS
VOMITING: 0
SHORTNESS OF BREATH: 1
CONSTIPATION: 0
DIARRHEA: 0
NAUSEA: 0
CHEST TIGHTNESS: 1
EYE REDNESS: 0

## 2018-06-25 ENCOUNTER — HOSPITAL ENCOUNTER (OUTPATIENT)
Dept: GENERAL RADIOLOGY | Age: 73
Discharge: HOME | End: 2018-06-25

## 2018-06-25 LAB
A/G RATIO: 1.8 (ref 1.1–2.2)
ALBUMIN SERPL-MCNC: 4.5 G/DL (ref 3.4–5)
ALP BLD-CCNC: 54 U/L (ref 40–129)
ALT SERPL-CCNC: 15 U/L (ref 10–40)
ANION GAP SERPL CALCULATED.3IONS-SCNC: 18 MMOL/L (ref 3–16)
AST SERPL-CCNC: 21 U/L (ref 15–37)
BILIRUB SERPL-MCNC: 0.6 MG/DL (ref 0–1)
BUN BLDV-MCNC: 13 MG/DL (ref 7–20)
CALCIUM SERPL-MCNC: 9.9 MG/DL (ref 8.3–10.6)
CHLORIDE BLD-SCNC: 104 MMOL/L (ref 99–110)
CO2: 22 MMOL/L (ref 21–32)
CREAT SERPL-MCNC: 0.8 MG/DL (ref 0.6–1.2)
GFR AFRICAN AMERICAN: >60
GFR NON-AFRICAN AMERICAN: >60
GLOBULIN: 2.5 G/DL
GLUCOSE BLD-MCNC: 100 MG/DL (ref 70–99)
HCT VFR BLD CALC: 40.9 % (ref 36–48)
HEMOGLOBIN: 14.1 G/DL (ref 12–16)
MCH RBC QN AUTO: 30.9 PG (ref 26–34)
MCHC RBC AUTO-ENTMCNC: 34.5 G/DL (ref 31–36)
MCV RBC AUTO: 89.7 FL (ref 80–100)
PDW BLD-RTO: 13.7 % (ref 12.4–15.4)
PLATELET # BLD: 264 K/UL (ref 135–450)
PMV BLD AUTO: 8.2 FL (ref 5–10.5)
POTASSIUM SERPL-SCNC: 4.2 MMOL/L (ref 3.5–5.1)
RBC # BLD: 4.56 M/UL (ref 4–5.2)
SODIUM BLD-SCNC: 144 MMOL/L (ref 136–145)
TOTAL PROTEIN: 7 G/DL (ref 6.4–8.2)
WBC # BLD: 5.5 K/UL (ref 4–11)

## 2018-10-09 RX ORDER — ALENDRONATE SODIUM 70 MG/1
TABLET ORAL
Qty: 12 TABLET | Refills: 3 | Status: SHIPPED | OUTPATIENT
Start: 2018-10-09 | End: 2019-10-24 | Stop reason: SDUPTHER

## 2018-12-28 ENCOUNTER — OFFICE VISIT (OUTPATIENT)
Dept: FAMILY MEDICINE CLINIC | Age: 73
End: 2018-12-28
Payer: MEDICARE

## 2018-12-28 VITALS
DIASTOLIC BLOOD PRESSURE: 66 MMHG | BODY MASS INDEX: 19.55 KG/M2 | HEIGHT: 68 IN | SYSTOLIC BLOOD PRESSURE: 118 MMHG | HEART RATE: 86 BPM | WEIGHT: 129 LBS | OXYGEN SATURATION: 98 %

## 2018-12-28 DIAGNOSIS — S39.012A LUMBAR STRAIN, INITIAL ENCOUNTER: Primary | ICD-10-CM

## 2018-12-28 PROCEDURE — 96372 THER/PROPH/DIAG INJ SC/IM: CPT | Performed by: FAMILY MEDICINE

## 2018-12-28 PROCEDURE — 1036F TOBACCO NON-USER: CPT | Performed by: FAMILY MEDICINE

## 2018-12-28 PROCEDURE — G8420 CALC BMI NORM PARAMETERS: HCPCS | Performed by: FAMILY MEDICINE

## 2018-12-28 PROCEDURE — 90662 IIV NO PRSV INCREASED AG IM: CPT | Performed by: FAMILY MEDICINE

## 2018-12-28 PROCEDURE — G8427 DOCREV CUR MEDS BY ELIG CLIN: HCPCS | Performed by: FAMILY MEDICINE

## 2018-12-28 PROCEDURE — 3017F COLORECTAL CA SCREEN DOC REV: CPT | Performed by: FAMILY MEDICINE

## 2018-12-28 PROCEDURE — G0008 ADMIN INFLUENZA VIRUS VAC: HCPCS | Performed by: FAMILY MEDICINE

## 2018-12-28 PROCEDURE — G8400 PT W/DXA NO RESULTS DOC: HCPCS | Performed by: FAMILY MEDICINE

## 2018-12-28 PROCEDURE — G8482 FLU IMMUNIZE ORDER/ADMIN: HCPCS | Performed by: FAMILY MEDICINE

## 2018-12-28 PROCEDURE — 99214 OFFICE O/P EST MOD 30 MIN: CPT | Performed by: FAMILY MEDICINE

## 2018-12-28 PROCEDURE — 1123F ACP DISCUSS/DSCN MKR DOCD: CPT | Performed by: FAMILY MEDICINE

## 2018-12-28 PROCEDURE — 1101F PT FALLS ASSESS-DOCD LE1/YR: CPT | Performed by: FAMILY MEDICINE

## 2018-12-28 PROCEDURE — 1090F PRES/ABSN URINE INCON ASSESS: CPT | Performed by: FAMILY MEDICINE

## 2018-12-28 PROCEDURE — 4040F PNEUMOC VAC/ADMIN/RCVD: CPT | Performed by: FAMILY MEDICINE

## 2018-12-28 RX ORDER — TIZANIDINE 4 MG/1
4 TABLET ORAL 2 TIMES DAILY PRN
Qty: 60 TABLET | Refills: 0 | Status: SHIPPED | OUTPATIENT
Start: 2018-12-28 | End: 2019-01-15 | Stop reason: ALTCHOICE

## 2018-12-28 RX ORDER — KETOROLAC TROMETHAMINE 30 MG/ML
30 INJECTION, SOLUTION INTRAMUSCULAR; INTRAVENOUS ONCE
Status: COMPLETED | OUTPATIENT
Start: 2018-12-28 | End: 2018-12-28

## 2018-12-28 RX ORDER — ETODOLAC 400 MG/1
400 TABLET, FILM COATED ORAL 2 TIMES DAILY
Qty: 28 TABLET | Refills: 0 | Status: SHIPPED | OUTPATIENT
Start: 2018-12-28 | End: 2019-01-15 | Stop reason: ALTCHOICE

## 2018-12-28 RX ADMIN — KETOROLAC TROMETHAMINE 30 MG: 30 INJECTION, SOLUTION INTRAMUSCULAR; INTRAVENOUS at 10:56

## 2018-12-28 ASSESSMENT — ENCOUNTER SYMPTOMS
EYE REDNESS: 0
NAUSEA: 0
CONSTIPATION: 0
SHORTNESS OF BREATH: 0
DIARRHEA: 0
BACK PAIN: 1
VOMITING: 0

## 2018-12-28 NOTE — PATIENT INSTRUCTIONS
Patient Education        lumbar Strain: Rehab Exercises  Your Care Instructions  Here are some examples of typical rehabilitation exercises for your condition. Start each exercise slowly. Ease off the exercise if you start to have pain. Your doctor or physical therapist will tell you when you can start these exercises and which ones will work best for you. How to do the exercises  Tummy tuck    1. Lie on your back with your knees bent. Place two fingers just inside your hip bones so you can feel your lower belly muscles. 2. Take a deep breath in.  3. As you breathe out, pull your belly button in toward your spine, as if you are trying to zip up a tight pair of jeans. You should feel your lower belly muscles pull slightly away from your fingers as the muscles tighten. 4. Hold for about 6 seconds, but do not hold your breath. 5. Relax up to 10 seconds. 6. Repeat 8 to 12 times. 7. Repeat several times a day, and try to hold your lower belly muscles in for longer as you get stronger. 8. Practice doing this exercise while you are standing, such as when you are standing in line, or sitting. Pelvic tilt    1. Lie on your back with your knees bent. 2. \"Brace\" your stomach--tighten your muscles by pulling in and imagining your belly button moving toward your spine. 3. Press your lower back into the floor. You should feel your hips and pelvis rock back. 4. Hold for 6 seconds while breathing smoothly. 5. Relax and allow your pelvis and hips to rock forward. 6. Repeat 8 to 12 times. Curl-up    1. Lie down with your knees bent and your arms at your sides. Keep your feet flat on the floor. 2. Lift your head and shoulders up a few inches. At the same time, raise your arms to about thigh level. 3. Hold for 6 seconds. 4. Relax and return to your starting position. 5. Repeat 8 to 12 times. Diagonal curl-up    1. Lie down with your knees bent and your arms at your sides.  Keep your feet flat on the

## 2018-12-28 NOTE — PROGRESS NOTES
Vaccine Information Sheet, \"Influenza - Inactivated\"  given to [place] D[place], or parent/legal guardian of  [place] and verbalized understanding. Patient responses:    Have you ever had a reaction to a flu vaccine? No  Are you able to eat eggs without adverse effects? No  Do you have any current illness? NO  Have you ever had Guillian Vernon Syndrome? No    Flu vaccine given per order. Please see immunization tab.
Negative for pallor. Allergic/Immunologic: Negative for immunocompromised state. Psychiatric/Behavioral: Negative for confusion. All other systems reviewed and are negative. Current Outpatient Prescriptions   Medication Sig Dispense Refill    etodolac (LODINE) 400 MG tablet Take 1 tablet by mouth 2 times daily for 14 days 28 tablet 0    tiZANidine (ZANAFLEX) 4 MG tablet Take 1 tablet by mouth 2 times daily as needed (spasm) 60 tablet 0    alendronate (FOSAMAX) 70 MG tablet TAKE ONE TABLET BY MOUTH ONCE A WEEK (EVERY  7  DAYS) 12 tablet 3    calcium carbonate 600 MG TABS tablet Take 1 tablet by mouth daily      Biotin 5000 MCG TABS Take by mouth daily      esomeprazole Magnesium (NEXIUM) 20 MG PACK Take 20 mg by mouth daily.  Cholecalciferol (VITAMIN D) 2000 UNITS CAPS capsule Take 2,000 Units by mouth Daily.  ibuprofen (ADVIL;MOTRIN) 200 MG tablet Take 200 mg by mouth every 6 hours as needed. OTC        Current Facility-Administered Medications   Medication Dose Route Frequency Provider Last Rate Last Dose    ketorolac (TORADOL) injection 30 mg  30 mg Intramuscular Once Clemetine Sanchez, DO        albuterol (ACCUNEB) nebulizer solution 0.63 mg  0.63 mg Nebulization Once [place], APRN - CNP           Vitals:    12/28/18 1016   BP: 118/66   Pulse: 86   SpO2: 98%         Physical Exam  Physical Exam   Constitutional: She is oriented to person, place, and time. She appears well-developed and well-nourished. No distress. HENT:   Head: Normocephalic and atraumatic. Eyes: Conjunctivae are normal. No scleral icterus. Cardiovascular: Normal rate. Pulmonary/Chest: Effort normal. No stridor. Musculoskeletal: She exhibits tenderness. She exhibits no edema. Lumbar back: She exhibits decreased range of motion, tenderness and spasm. Back:    Neurological: She is alert and oriented to person, place, and time. Skin: Skin is warm. She is not diaphoretic. No erythema.

## 2019-01-02 ENCOUNTER — TELEPHONE (OUTPATIENT)
Dept: FAMILY MEDICINE CLINIC | Age: 74
End: 2019-01-02

## 2019-01-02 RX ORDER — NAPROXEN 500 MG/1
500 TABLET ORAL 2 TIMES DAILY PRN
Qty: 14 TABLET | Refills: 0 | Status: SHIPPED | OUTPATIENT
Start: 2019-01-02 | End: 2019-01-15 | Stop reason: ALTCHOICE

## 2019-01-03 ENCOUNTER — TELEPHONE (OUTPATIENT)
Dept: ORTHOPEDIC SURGERY | Age: 74
End: 2019-01-03

## 2019-01-03 NOTE — TELEPHONE ENCOUNTER
Received fax from [facility] for Etodolac 400MG OR TABS faxed back completed to [phone].

## 2019-01-15 ENCOUNTER — TELEPHONE (OUTPATIENT)
Dept: FAMILY MEDICINE CLINIC | Age: 74
End: 2019-01-15

## 2019-01-15 ENCOUNTER — APPOINTMENT (OUTPATIENT)
Dept: GENERAL RADIOLOGY | Age: 74
End: 2019-01-15
Payer: MEDICARE

## 2019-01-15 ENCOUNTER — HOSPITAL ENCOUNTER (EMERGENCY)
Age: 74
Discharge: HOME | End: 2019-01-15
Attending: EMERGENCY MEDICINE
Payer: MEDICARE

## 2019-01-15 VITALS
TEMPERATURE: 98 F | HEART RATE: 94 BPM | DIASTOLIC BLOOD PRESSURE: 72 MMHG | OXYGEN SATURATION: 99 % | WEIGHT: 127 LBS | RESPIRATION RATE: 8 BRPM | BODY MASS INDEX: 19.25 KG/M2 | SYSTOLIC BLOOD PRESSURE: 121 MMHG | HEIGHT: 68 IN

## 2019-01-15 DIAGNOSIS — R06.00 DYSPNEA, UNSPECIFIED TYPE: Primary | ICD-10-CM

## 2019-01-15 DIAGNOSIS — R07.89 CHEST TIGHTNESS: ICD-10-CM

## 2019-01-15 LAB
A/G RATIO: 1.6 (ref 1.1–2.2)
ALBUMIN SERPL-MCNC: 4.3 G/DL (ref 3.4–5)
ALP BLD-CCNC: 94 U/L (ref 40–129)
ALT SERPL-CCNC: 17 U/L (ref 10–40)
ANION GAP SERPL CALCULATED.3IONS-SCNC: 13 MMOL/L (ref 3–16)
AST SERPL-CCNC: 13 U/L (ref 15–37)
BASOPHILS ABSOLUTE: 0.1 K/UL (ref 0–0.2)
BASOPHILS RELATIVE PERCENT: 0.9 %
BILIRUB SERPL-MCNC: 0.4 MG/DL (ref 0–1)
BUN BLDV-MCNC: 12 MG/DL (ref 7–20)
CALCIUM SERPL-MCNC: 9.3 MG/DL (ref 8.3–10.6)
CHLORIDE BLD-SCNC: 99 MMOL/L (ref 99–110)
CO2: 25 MMOL/L (ref 21–32)
CREAT SERPL-MCNC: <0.5 MG/DL (ref 0.6–1.2)
D DIMER: 229 NG/ML DDU (ref 0–229)
EKG ATRIAL RATE: 101 BPM
EKG DIAGNOSIS: NORMAL
EKG P AXIS: 16 DEGREES
EKG P-R INTERVAL: 136 MS
EKG Q-T INTERVAL: 336 MS
EKG QRS DURATION: 66 MS
EKG QTC CALCULATION (BAZETT): 435 MS
EKG R AXIS: 7 DEGREES
EKG T AXIS: 37 DEGREES
EKG VENTRICULAR RATE: 101 BPM
EOSINOPHILS ABSOLUTE: 0.5 K/UL (ref 0–0.6)
EOSINOPHILS RELATIVE PERCENT: 8.3 %
GFR AFRICAN AMERICAN: >60
GFR NON-AFRICAN AMERICAN: >60
GLOBULIN: 2.7 G/DL
GLUCOSE BLD-MCNC: 111 MG/DL (ref 70–99)
HCT VFR BLD CALC: 40.6 % (ref 36–48)
HEMOGLOBIN: 13.4 G/DL (ref 12–16)
LYMPHOCYTES ABSOLUTE: 1.9 K/UL (ref 1–5.1)
LYMPHOCYTES RELATIVE PERCENT: 30 %
MCH RBC QN AUTO: 30 PG (ref 26–34)
MCHC RBC AUTO-ENTMCNC: 33 G/DL (ref 31–36)
MCV RBC AUTO: 90.8 FL (ref 80–100)
MONOCYTES ABSOLUTE: 0.5 K/UL (ref 0–1.3)
MONOCYTES RELATIVE PERCENT: 8 %
NEUTROPHILS ABSOLUTE: 3.4 K/UL (ref 1.7–7.7)
NEUTROPHILS RELATIVE PERCENT: 52.8 %
PDW BLD-RTO: 13.8 % (ref 12.4–15.4)
PLATELET # BLD: 240 K/UL (ref 135–450)
PMV BLD AUTO: 6.6 FL (ref 5–10.5)
POTASSIUM SERPL-SCNC: 3.5 MMOL/L (ref 3.5–5.1)
PRO-BNP: 398 PG/ML (ref 0–124)
RBC # BLD: 4.48 M/UL (ref 4–5.2)
SODIUM BLD-SCNC: 137 MMOL/L (ref 136–145)
TOTAL PROTEIN: 7 G/DL (ref 6.4–8.2)
TROPONIN: <0.01 NG/ML
TROPONIN: <0.01 NG/ML
WBC # BLD: 6.4 K/UL (ref 4–11)

## 2019-01-15 PROCEDURE — 99285 EMERGENCY DEPT VISIT HI MDM: CPT

## 2019-01-15 PROCEDURE — 93005 ELECTROCARDIOGRAM TRACING: CPT | Performed by: EMERGENCY MEDICINE

## 2019-01-15 PROCEDURE — 93010 ELECTROCARDIOGRAM REPORT: CPT | Performed by: INTERNAL MEDICINE

## 2019-01-15 PROCEDURE — 6370000000 HC RX 637 (ALT 250 FOR IP): Performed by: EMERGENCY MEDICINE

## 2019-01-15 PROCEDURE — 83880 ASSAY OF NATRIURETIC PEPTIDE: CPT

## 2019-01-15 PROCEDURE — 80053 COMPREHEN METABOLIC PANEL: CPT

## 2019-01-15 PROCEDURE — 84484 ASSAY OF TROPONIN QUANT: CPT

## 2019-01-15 PROCEDURE — 71046 X-RAY EXAM CHEST 2 VIEWS: CPT

## 2019-01-15 PROCEDURE — 85025 COMPLETE CBC W/AUTO DIFF WBC: CPT

## 2019-01-15 PROCEDURE — 85379 FIBRIN DEGRADATION QUANT: CPT

## 2019-01-15 PROCEDURE — 36415 COLL VENOUS BLD VENIPUNCTURE: CPT

## 2019-01-15 RX ORDER — HYDROCODONE BITARTRATE AND ACETAMINOPHEN 5; 325 MG/1; MG/1
1-2 TABLET ORAL EVERY 8 HOURS PRN
Qty: 6 TABLET | Refills: 0 | Status: SHIPPED | OUTPATIENT
Start: 2019-01-15 | End: 2019-01-18

## 2019-01-15 RX ORDER — ONDANSETRON 2 MG/ML
4 INJECTION INTRAMUSCULAR; INTRAVENOUS EVERY 30 MIN PRN
Status: DISCONTINUED | OUTPATIENT
Start: 2019-01-15 | End: 2019-01-15 | Stop reason: HOSPADM

## 2019-01-15 RX ORDER — MORPHINE SULFATE 4 MG/ML
4 INJECTION, SOLUTION INTRAMUSCULAR; INTRAVENOUS EVERY 30 MIN PRN
Status: DISCONTINUED | OUTPATIENT
Start: 2019-01-15 | End: 2019-01-15 | Stop reason: HOSPADM

## 2019-01-15 RX ORDER — ASPIRIN 81 MG/1
324 TABLET, CHEWABLE ORAL ONCE
Status: COMPLETED | OUTPATIENT
Start: 2019-01-15 | End: 2019-01-15

## 2019-01-15 RX ADMIN — ASPIRIN 81 MG 324 MG: 81 TABLET ORAL at 18:08

## 2019-01-15 RX ADMIN — NITROGLYCERIN 0.5 INCH: 20 OINTMENT TOPICAL at 18:08

## 2019-01-15 ASSESSMENT — ENCOUNTER SYMPTOMS
ABDOMINAL PAIN: 0
COLOR CHANGE: 0
SHORTNESS OF BREATH: 1
COUGH: 0
PHOTOPHOBIA: 0
VOICE CHANGE: 0
EYE REDNESS: 0
RHINORRHEA: 0
BLOOD IN STOOL: 0
SORE THROAT: 0
WHEEZING: 0
NAUSEA: 0
ABDOMINAL DISTENTION: 0
VOMITING: 0
EYE DISCHARGE: 0
APNEA: 0
CONSTIPATION: 0
CHEST TIGHTNESS: 0
EYE PAIN: 0
STRIDOR: 0
BACK PAIN: 0
TROUBLE SWALLOWING: 0
DIARRHEA: 0
RECTAL PAIN: 0
SINUS PRESSURE: 0

## 2019-01-15 ASSESSMENT — PAIN SCALES - GENERAL: PAINLEVEL_OUTOF10: 10

## 2019-01-15 ASSESSMENT — PAIN DESCRIPTION - LOCATION: LOCATION: CHEST

## 2019-01-16 LAB
EKG ATRIAL RATE: 96 BPM
EKG DIAGNOSIS: NORMAL
EKG P AXIS: 1 DEGREES
EKG P-R INTERVAL: 124 MS
EKG Q-T INTERVAL: 366 MS
EKG QRS DURATION: 62 MS
EKG QTC CALCULATION (BAZETT): 462 MS
EKG R AXIS: 10 DEGREES
EKG T AXIS: 18 DEGREES
EKG VENTRICULAR RATE: 96 BPM

## 2019-01-16 PROCEDURE — 93010 ELECTROCARDIOGRAM REPORT: CPT | Performed by: INTERNAL MEDICINE

## 2019-07-25 ENCOUNTER — HOSPITAL ENCOUNTER (OUTPATIENT)
Dept: MAMMOGRAPHY | Age: 74
Discharge: HOME | End: 2019-07-25
Payer: MEDICARE

## 2019-07-25 DIAGNOSIS — Z12.39 BREAST CANCER SCREENING: ICD-10-CM

## 2019-07-25 PROCEDURE — 77063 BREAST TOMOSYNTHESIS BI: CPT

## 2019-08-12 ENCOUNTER — HOSPITAL ENCOUNTER (OUTPATIENT)
Dept: WOMENS IMAGING | Age: 74
Discharge: HOME | End: 2019-08-12
Payer: MEDICARE

## 2019-08-12 DIAGNOSIS — R92.8 ABNORMAL MAMMOGRAM: ICD-10-CM

## 2019-08-12 PROCEDURE — 76642 ULTRASOUND BREAST LIMITED: CPT

## 2019-08-12 PROCEDURE — 77065 DX MAMMO INCL CAD UNI: CPT

## 2019-08-12 PROCEDURE — G0279 TOMOSYNTHESIS, MAMMO: HCPCS

## 2019-08-12 NOTE — PROGRESS NOTES
needle site to stop any bleeding.   A bandage will be placed over the site.   A post mammogram picture will be taken to document the clip placement. How long does the test take? Approximately 60 minutes. Most of the time is spent finding the area for the biopsy. What are the risks?   Bleeding: You may have some bleeding which can cause bruising, swelling,    or a bleeding under your skin.   Infection:  Signs of infection are redness, swelling, heat, or increasing pain    at the biopsy Site.   Sample size not adequate: This would require repeating the biopsy. What happens after the test?    The nurse will review your post biopsy instructions which include:         Placing an ice pack in your bra.    Wearing a firm fitting bra.    You may use Tylenol (Acetaminiphen) for discomfort. Lab results take about 2-3 business days. The Nurse Navigator or your doctor will call you with the results. Ultrasound Breast Biopsy:     (Please arrive 15 minutes early)                                                 [x] Blood thinner history reviewed with patient    [x] Take all your other medications on your normal routine schedule. [x] You may eat and drink as normal before your biopsy. [x] You may drive yourself. [x] No heavy lifting or exercise for 48 hours after the biopsy. [x] Printed Pre Ultrasound Biopsy Instructions were provided, reviewed with the patient and all questions were answered. [facility] Information: [address] Should you experience any significant changes in your health or have questions about your care, please contact the [facility] at [phone]. [x] Patient/POA/Caregiver verbalized understanding of instructions.        Electronically signed by Tiffany Chavira on 8/12/2019 at 11:14 AM

## 2019-08-22 ENCOUNTER — HOSPITAL ENCOUNTER (OUTPATIENT)
Dept: WOMENS IMAGING | Age: 74
Discharge: HOME | End: 2019-08-22
Payer: MEDICARE

## 2019-08-22 DIAGNOSIS — R92.8 ABNORMAL MAMMOGRAM: ICD-10-CM

## 2019-08-22 PROCEDURE — 2709999900 US BREAST BIOPSY W LOC DEVICE 1ST LESION RIGHT

## 2019-08-22 PROCEDURE — 88305 TISSUE EXAM BY PATHOLOGIST: CPT

## 2019-08-22 PROCEDURE — G0279 TOMOSYNTHESIS, MAMMO: HCPCS

## 2019-08-22 NOTE — PROGRESS NOTES
Patient in [facility] for breast biopsy. Radiologist reviewed procedure with patient, consent signed. Patient tolerated procedure well. Compression held. Site cleansed with chloraprep, steri strips and dry dressing applied. Ice pack provided. Reviewed discharge instructions with patient. Patient verbalized understanding and agreed to contact Nurse Navigator with any questions. Patient was A&Ox3 and steady on feet and discharged to waiting area. [facility] [address]  [address]  [address]  Telephone: [phone]    NAME:  Kalpana White OF BIRTH:  1945  GENDER: female  MEDICAL RECORD NUMBER:  [id number]  TODAY'S DATE:  8/22/2019    Discharge Instructions [facility]:    Post Breast Biopsy Instructions    [x] Place a cold pack inside your bra on top of the dressing for at least 3 hours, removing it every 15 minutes for 15 minutes after your biopsy. [x] Do not take Aspirin the day of your biopsy. [x] Your physician has instructed you to take Tylenol (Acetaminophen) the day of your biopsy for any discomfort. [x] Watch for excessive bleeding. If bleeding occurs apply pressure to site. Watch for signs of infection, increased pain, redness, swelling and heat. If this occurs call your physician. [x] Do not participate in any strenuous exercise for 48 hours after your biopsy, such as tennis, aerobics, weight lifting and household activities that involve use of your affected   side. [x] You may remove your outer dressing in 24 hours and you may shower. \"Steri-strips\" tape will stay on for 5-7 days. [facility] Information:   Should you experience any significant changes in your health or have questions about your care, please contact:  [address] [phone]  Monday-Friday.   If you need help with your care outside these hours and cannot wait until we are again

## 2019-08-23 ENCOUNTER — TELEPHONE (OUTPATIENT)
Dept: WOMENS IMAGING | Age: 74
End: 2019-08-23

## 2019-10-25 RX ORDER — ALENDRONATE SODIUM 70 MG/1
TABLET ORAL
Qty: 12 TABLET | Refills: 3 | Status: SHIPPED | OUTPATIENT
Start: 2019-10-25 | End: 2020-09-30

## 2020-02-12 ENCOUNTER — HOSPITAL ENCOUNTER (OUTPATIENT)
Dept: WOMENS IMAGING | Age: 75
Discharge: HOME | End: 2020-02-12
Payer: MEDICARE

## 2020-02-12 PROCEDURE — 76642 ULTRASOUND BREAST LIMITED: CPT

## 2020-02-12 PROCEDURE — G0279 TOMOSYNTHESIS, MAMMO: HCPCS

## 2020-09-30 RX ORDER — ALENDRONATE SODIUM 70 MG/1
TABLET ORAL
Qty: 12 TABLET | Refills: 0 | Status: SHIPPED | OUTPATIENT
Start: 2020-09-30 | End: 2020-12-22

## 2020-12-22 RX ORDER — ALENDRONATE SODIUM 70 MG/1
TABLET ORAL
Qty: 12 TABLET | Refills: 0 | Status: SHIPPED | OUTPATIENT
Start: 2020-12-22 | End: 2022-07-15

## 2022-07-15 RX ORDER — CHOLECALCIFEROL (VITAMIN D3) 125 MCG
500 CAPSULE ORAL DAILY
COMMUNITY

## 2022-07-15 RX ORDER — ONDANSETRON 4 MG/1
4 TABLET, FILM COATED ORAL EVERY 8 HOURS PRN
COMMUNITY

## 2022-07-15 RX ORDER — CELECOXIB 200 MG/1
200 CAPSULE ORAL 2 TIMES DAILY PRN
COMMUNITY
End: 2022-09-06

## 2022-07-15 NOTE — PROGRESS NOTES

## 2022-07-17 NOTE — H&P
Heart. Gastrointestinal Present- Dysphagia and Nausea. Not Present- Abdominal Pain, Black, Tarry Stool, Bloating/Excess Gas, Bloody Stool, Constipation, Decreased Appetite, Diarrhea, Difficulty Swallowing, Heartburn, Incontinence of Stool, Jaundice, Nausea/Vomiting, Straining at stool and Weight Loss. Musculoskeletal Not Present- Joint Pain, Joint Swelling and Swollen Feet. Neurological Not Present- Dizziness, Frequent Headaches, Memory Loss, Muscle Weakness, Passing Out and Seizures. Psychiatric Not Present- Anxiety, Depression and Nervousness. Vitals (Vishal Angle; 7/5/2022 1:38 PM)  7/5/2022 1:37 PM  Weight: 138.56 lb   Height: 67 in   Height was reported by patient. Body Surface Area: 1.73 m²   Body Mass Index: 21.7 kg/m²                Physical Exam Ramiro Brennan CNP; 7/5/2022 2:39 PM)  The physical exam findings are as follows:  Note:  Head: Normocephalic, Atraumatic  Eyes: PERRL,Sclera: clear , Pallor NO  Mouth:Tongue: papillated, No congestion  Respiratory: No respiratory distress, No audible wheezing. GI: No tenderness, No Guarding, No rigidity, No rebound tenderness  Extremities: Intact distal pulses, no edema, No tenderness, No cyanosis, No clubbing. Neurologic: Alert & oriented x 3  Skin: Warm and dry        Assessment & Plan Ramiro Brennan CNP; 7/5/2022 4:29 PM)    Oropharyngeal dysphagia [R13.12]  Impression: -- Take your Nexium 30-60 mins prior to breakfast and 30 mins prior to [address]. -- Continue Nausea medication as needed. -- Small Bites of food followed by sips of water. -- Full Upright position with each meal. Avoid laying flat for about 2 hours after meals. Current Plans  EGD (05859)  Note: Pleasant 77 yo F w/ Dysphagia. Follows with Dr. Hanna Olivares. Last EGD with dilation in 7/2020. 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 Presents with progressive Dysphagia over past 2-3 months. H/o of dilation in past for benign strictures with good results.   Symptoms with

## 2022-07-18 ENCOUNTER — HOSPITAL ENCOUNTER (OUTPATIENT)
Age: 77
Setting detail: OUTPATIENT SURGERY
Discharge: HOME | End: 2022-07-18
Attending: INTERNAL MEDICINE | Admitting: INTERNAL MEDICINE
Payer: MEDICARE

## 2022-07-18 ENCOUNTER — ANESTHESIA EVENT (OUTPATIENT)
Dept: ENDOSCOPY | Age: 77
End: 2022-07-18
Payer: MEDICARE

## 2022-07-18 ENCOUNTER — ANESTHESIA (OUTPATIENT)
Dept: ENDOSCOPY | Age: 77
End: 2022-07-18
Payer: MEDICARE

## 2022-07-18 VITALS
OXYGEN SATURATION: 98 % | HEIGHT: 67 IN | SYSTOLIC BLOOD PRESSURE: 131 MMHG | BODY MASS INDEX: 20.72 KG/M2 | DIASTOLIC BLOOD PRESSURE: 64 MMHG | WEIGHT: 132 LBS | RESPIRATION RATE: 16 BRPM | HEART RATE: 67 BPM | TEMPERATURE: 96.9 F

## 2022-07-18 DIAGNOSIS — R13.12 OROPHARYNGEAL DYSPHAGIA: ICD-10-CM

## 2022-07-18 PROCEDURE — 2709999900 HC NON-CHARGEABLE SUPPLY: Performed by: INTERNAL MEDICINE

## 2022-07-18 PROCEDURE — 2580000003 HC RX 258: Performed by: NURSE ANESTHETIST, CERTIFIED REGISTERED

## 2022-07-18 PROCEDURE — 3700000000 HC ANESTHESIA ATTENDED CARE: Performed by: INTERNAL MEDICINE

## 2022-07-18 PROCEDURE — 2500000003 HC RX 250 WO HCPCS: Performed by: NURSE ANESTHETIST, CERTIFIED REGISTERED

## 2022-07-18 PROCEDURE — 88305 TISSUE EXAM BY PATHOLOGIST: CPT

## 2022-07-18 PROCEDURE — 6360000002 HC RX W HCPCS: Performed by: NURSE ANESTHETIST, CERTIFIED REGISTERED

## 2022-07-18 PROCEDURE — 3609012400 HC EGD TRANSORAL BIOPSY SINGLE/MULTIPLE: Performed by: INTERNAL MEDICINE

## 2022-07-18 PROCEDURE — 3700000001 HC ADD 15 MINUTES (ANESTHESIA): Performed by: INTERNAL MEDICINE

## 2022-07-18 PROCEDURE — 3609017700 HC EGD DILATION GASTRIC/DUODENAL STRICTURE: Performed by: INTERNAL MEDICINE

## 2022-07-18 PROCEDURE — 2580000003 HC RX 258: Performed by: INTERNAL MEDICINE

## 2022-07-18 PROCEDURE — 7100000011 HC PHASE II RECOVERY - ADDTL 15 MIN: Performed by: INTERNAL MEDICINE

## 2022-07-18 PROCEDURE — 7100000010 HC PHASE II RECOVERY - FIRST 15 MIN: Performed by: INTERNAL MEDICINE

## 2022-07-18 PROCEDURE — C1769 GUIDE WIRE: HCPCS | Performed by: INTERNAL MEDICINE

## 2022-07-18 RX ORDER — SODIUM CHLORIDE, SODIUM LACTATE, POTASSIUM CHLORIDE, CALCIUM CHLORIDE 600; 310; 30; 20 MG/100ML; MG/100ML; MG/100ML; MG/100ML
INJECTION, SOLUTION INTRAVENOUS CONTINUOUS
Status: DISCONTINUED | OUTPATIENT
Start: 2022-07-18 | End: 2022-07-18 | Stop reason: HOSPADM

## 2022-07-18 RX ORDER — SODIUM CHLORIDE, SODIUM LACTATE, POTASSIUM CHLORIDE, CALCIUM CHLORIDE 600; 310; 30; 20 MG/100ML; MG/100ML; MG/100ML; MG/100ML
INJECTION, SOLUTION INTRAVENOUS CONTINUOUS PRN
Status: DISCONTINUED | OUTPATIENT
Start: 2022-07-18 | End: 2022-07-18 | Stop reason: SDUPTHER

## 2022-07-18 RX ORDER — PROPOFOL 10 MG/ML
INJECTION, EMULSION INTRAVENOUS PRN
Status: DISCONTINUED | OUTPATIENT
Start: 2022-07-18 | End: 2022-07-18 | Stop reason: SDUPTHER

## 2022-07-18 RX ORDER — LIDOCAINE HYDROCHLORIDE 10 MG/ML
0.1 INJECTION, SOLUTION INFILTRATION; PERINEURAL ONCE
Status: DISCONTINUED | OUTPATIENT
Start: 2022-07-18 | End: 2022-07-18 | Stop reason: HOSPADM

## 2022-07-18 RX ORDER — LIDOCAINE HYDROCHLORIDE 20 MG/ML
INJECTION, SOLUTION INFILTRATION; PERINEURAL PRN
Status: DISCONTINUED | OUTPATIENT
Start: 2022-07-18 | End: 2022-07-18 | Stop reason: SDUPTHER

## 2022-07-18 RX ADMIN — LIDOCAINE HYDROCHLORIDE 100 MG: 20 INJECTION, SOLUTION INFILTRATION; PERINEURAL at 08:49

## 2022-07-18 RX ADMIN — PROPOFOL 50 MG: 10 INJECTION, EMULSION INTRAVENOUS at 09:03

## 2022-07-18 RX ADMIN — PROPOFOL 50 MG: 10 INJECTION, EMULSION INTRAVENOUS at 08:54

## 2022-07-18 RX ADMIN — PROPOFOL 100 MG: 10 INJECTION, EMULSION INTRAVENOUS at 08:49

## 2022-07-18 RX ADMIN — SODIUM CHLORIDE, SODIUM LACTATE, POTASSIUM CHLORIDE, AND CALCIUM CHLORIDE: .6; .31; .03; .02 INJECTION, SOLUTION INTRAVENOUS at 08:41

## 2022-07-18 RX ADMIN — PROPOFOL 50 MG: 10 INJECTION, EMULSION INTRAVENOUS at 08:59

## 2022-07-18 RX ADMIN — PROPOFOL 50 MG: 10 INJECTION, EMULSION INTRAVENOUS at 08:57

## 2022-07-18 RX ADMIN — SODIUM CHLORIDE, POTASSIUM CHLORIDE, SODIUM LACTATE AND CALCIUM CHLORIDE: 600; 310; 30; 20 INJECTION, SOLUTION INTRAVENOUS at 07:41

## 2022-07-18 RX ADMIN — PROPOFOL 50 MG: 10 INJECTION, EMULSION INTRAVENOUS at 08:52

## 2022-07-18 ASSESSMENT — PAIN SCALES - GENERAL
PAINLEVEL_OUTOF10: 0

## 2022-07-18 ASSESSMENT — PAIN - FUNCTIONAL ASSESSMENT: PAIN_FUNCTIONAL_ASSESSMENT: 0-10

## 2022-07-18 NOTE — OP NOTE
[address] [facility] [address],  [address], [address] [facility]  Phone: [phone]  [address] [place] [address]  Phone: [phone]   AB:[phone]    EGD Procedure Note    Patient: [facility]  : 1945    Procedure: Esophagogastroduodenoscopy with biopsy, esophageal dilation    Date:  2022     Endoscopist:  Darcie Cross MD    Referring Physician:  Veronica Seth DO    Preoperative Diagnosis:  Oropharyngeal dysphagia [R13.12]    Postoperative Diagnosis:  * No post-op diagnosis entered *    Anesthesia: Anesthesia: MAC  Sedation: Propofol per anesthesia  Start Time: 852  Stop Time: 906  ASA Class: 2  Mallampati: II (soft palate, uvula, fauces visible)    Indications: This is a 78y.o. 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 Procedure Details  Informed consent was obtained for the procedure, including conscious sedation. Risks of pancreatitis, infection, perforation, hemorrhage, adverse drug reaction and aspiration were discussed. The patient was placed in the left lateral decubitus position. Based on the pre-procedure assessment, including review of the patient's medical history, medications, allergies, and review of systems, she had been deemed to be an appropriate candidate for the above IV sedation; she was therefore sedated with the medications listed above. She was monitored continuously with ECG tracing, pulse oximetry, blood pressure monitoring, and direct observation. A gastroscope was inserted into the mouth and advanced under direct vision to second portion of the duodenum. A careful inspection was made as the gastroscope was withdrawn, including a retroflexed view of the proximal stomach including views of the incisura and cardia; findings and interventions are described below.  Appropriate photodocumentation Was Obtained. If photos taken, they were ordered to be scanned into the medical record. Findings:  Innumerable small yellowish plaques in the mid and distal esophagus consistent with known esophageal sebaceous gland heterotropia, biopsied. No obvious evidence of esophageal luminal narrowing or stricture. Empiric savory dilation over guidewire performed with 48F, 51F and 54F Savory dilators to maximal diameter of 54F. Mild mucosal disruption and heme noted on the upper esophageal sphincter. No crepitus noted in the neck postprocedure. Irregular Z-line at 35 cm from incisors  Mild erythematous mucosa in antrum and body of stomach suggestive of mild gastritis. Not biopsied. Normal duodenal bulb and second portion of duodenum. Specimens: Was Obtained:    Complications:   None; patient tolerated the procedure well. Disposition:   PACU - hemodynamically stable. Estimated Blood loss:  none    Impression:   Innumerable small yellowish plaques in the mid and distal esophagus consistent with known esophageal sebaceous gland heterotropia, biopsied. No obvious evidence of esophageal luminal narrowing or stricture. Empiric savory dilation over guidewire performed to maximal diameter of 54F. Mild mucosal disruption and heme noted on the upper esophageal sphincter. No crepitus noted in the neck postprocedure. Irregular Z-line at 35 cm from incisors  Mild gastritis. Not biopsied. Normal duodenal bulb and second portion of duodenum. Recommendations:  -Continue acid suppression. ,   -Await pathology. ,   -Follow symptoms. ,   -Follow up with GI, Dr. Lisa Reyes. Laura Kennedy MD   [facility]  7/18/22 9:09 AM EDT    Please note that some or all of this record was generated using voice recognition software. If there are any questions about the content of this document, please contact the author as some errors in translation may have occurred.

## 2022-07-18 NOTE — PROGRESS NOTES
Awake and alert with no complaints. Discharge instructions reviewed with patient/responsible adult and understanding verbalized. Discharge instructions signed and copies given. Patient discharged home with belongings.

## 2022-07-18 NOTE — DISCHARGE INSTRUCTIONS
PATIENT INSTRUCTIONS  POST-SEDATION    Bhaskar Morse          IMMEDIATELY FOLLOWING PROCEDURE:    Do not drive or operate machinery for the first twenty four hours after surgery. Do not make any important decisions for twenty four hours after surgery or while taking narcotic pain medications or sedatives. You should NOT BE LEFT UNATTENDED OR ALONE. A responsible adult should be with you for the rest of the day of your procedure and also during the night for your protection and safety. You may experience some light headedness. Rest at home with activity as tolerated. You may not need to go to bed, but it is important to rest for the next 24 hours. You should not engage in athletic sports such as basketball, volleyball, jogging, skating, or activities requiring refined motor skills for 24 hours. If you develop intractable nausea and vomiting or a severe headache please notify your doctor immediately. You are not expected to have any fever, but if you feel warm, take your temperature. If you have a fever 101 degrees or higher, call your doctor. If you have had an Endoscopy:   *Eat lightly for your first meal and gradually resume your normal / prescribed diet. DO NOT eat or drink until your gag reflex returns. *If you have a sore throat you may use lozenges, or salt water gargles. ONCE YOU ARE HOME, IF YOU SHOULD HAVE:  Difficulty in breathing, persistent nausea or vomiting, bleeding you feel is excessive, or pain that is unusual, increased abdominal bloating, or any swelling, fever / chills, call your physician. If you cannot contact your physician, but feel that your signs and symptoms need a physician's attention, go to the Emergency Department. FOLLOW-UP:    Please follow up with @[email or web address]@ as scheduled or needed. Dr. Nathalie Garcia MD will call you with the biopsy findings. Call Dr. Nathalie Garcia MD if there are any GI concerns.  [phone]      You may be receiving a follow up phone call to ask about your care. Upper GI Endoscopy: What to Expect at [address] [place] [address]  After you have an endoscopy, you will stay at the hospital or clinic for 1 to 2 hours. This will allow the medicine to wear off. You will be able to go home after your doctor or nurse checks to make sure you are not having any problems. You may have to stay overnight if you had treatment during the test. You may have a sore throat for a day or two after the test.  This care sheet gives you a general idea about what to expect after the test.  How can you care for yourself at home? Activity  Rest as much as you need to after you go home. You should be able to go back to your usual activities the day after the test.  Diet  Follow your doctor's directions for eating after the test.  Drink plenty of fluids (unless your doctor has told you not to). Medications  If you have a sore throat the day after the test, use an over-the-counter spray to numb your throat. Follow-up care is a key part of your treatment and safety. Be sure to make and go to all appointments, and call your doctor if you are having problems. It's also a good idea to know your test results and keep a list of the medicines you take. When should you call for help? Call 911 anytime you think you may need emergency care. For example, call if:    You passed out (loses consciousness). You have trouble breathing. You pass maroon or bloody stools. Call your doctor now or seek immediate medical care if:    You have pain that does not get better after your take pain medicine. You have new or worse belly pain. You have blood in your stools. You are sick to your stomach and cannot keep fluids down. You have a fever. You cannot pass stools or gas. Watch closely for changes in your health, and be sure to contact your doctor if:    Your throat still hurts after a day or two. You do not get better as expected.    Where can you learn more? Go to [email or web address] and sign in to your [employer] account. Enter T760 in the Trending Taste box to learn more about \"Upper GI Endoscopy: What to Expect at Home. \"     If you do not have an account, please click on the \"Sign Up Now\" link. Current as of: May 12, 2017  Content Version: 11.6  © 6549-0883 [employer]. Care instructions adapted under license by [facility] ([facility]). If you have questions about a medical condition or this instruction, always ask your healthcare professional. [address] any warranty or liability for your use of this information.

## 2022-07-18 NOTE — ANESTHESIA POSTPROCEDURE EVALUATION
Department of Anesthesiology  Postprocedure Note    Patient: Gunnar Schneider  MRN: [id number]  YOB: 1945  Date of evaluation: 7/18/2022      Procedure Summary     Date: 07/18/22 Room / Location: [address] [place] [address]    Anesthesia Start: 2758 Anesthesia Stop: 6177    Procedures:       EGD BIOPSY      EGD ESOPHAGOGASTRODUODENOSCOPY DILATATION Diagnosis:       Oropharyngeal dysphagia      (Oropharyngeal dysphagia [R13.12])    Surgeons: Fiona Lozano MD Responsible Provider: Chely Gaytan MD    Anesthesia Type: MAC ASA Status: 3          Anesthesia Type: No value filed.     Ninfa Phase I: Ninfa Score: 10    Ninfa Phase II: Ninfa Score: 10      Anesthesia Post Evaluation    Comments: Postoperative Anesthesia Note    Name:    Gunnar Schneider  MRN:      [id number]    Patient Vitals in the past 12 hrs:  07/18/22 0940, BP:131/64, Pulse:67, Resp:16, SpO2:98 %  07/18/22 0925, BP:125/64, Pulse:73, Resp:16, SpO2:98 %  07/18/22 0910, BP:109/60, Pulse:73, Resp:14, SpO2:96 %  07/18/22 0736, BP:136/82, Temp:96.9 °F (36.1 °C), Pulse:85, Resp:16, SpO2:97 %, Height:5' 7\" (1.702 m), Weight:132 lb (59.9 kg)     LABS:    CBC  Lab Results       Component                Value               Date/Time                  WBC                      6.4                 01/15/2019 04:57 PM        HGB                      13.4                01/15/2019 04:57 PM        HCT                      40.6                01/15/2019 04:57 PM        PLT                      240                 01/15/2019 04:57 PM   RENAL  Lab Results       Component                Value               Date/Time                  NA                       137                 01/15/2019 04:57 PM        K                        3.5                 01/15/2019 04:57 PM        CL                       99                  01/15/2019 04:57 PM        CO2                      25                  01/15/2019 04:57 PM        BUN 12                  01/15/2019 04:57 PM        CREATININE               <0.5 (L)            01/15/2019 04:57 PM        GLUCOSE                  111 (H)             01/15/2019 04:57 PM   COAGS  No results found for: PROTIME, INR, APTT    Intake & Output: In: 750 (I.V.:750)  Out: -     Nausea & Vomiting:  No    Level of Consciousness:  Awake    Pain Assessment:  Adequate analgesia    Anesthesia Complications:  No apparent anesthetic complications    SUMMARY      Vital signs stable  OK to discharge from Stage I post anesthesia care.   Care transferred from Anesthesiology department on discharge from perioperative area

## 2022-07-18 NOTE — ANESTHESIA PRE PROCEDURE
Department of Anesthesiology  Preprocedure Note       Name:  Genoveva Gallegos   Age:  78 y.o.  :  1945                                          MRN:  [id number]         Date:  2022      Surgeon: Jessie Mohs):  Darcie Cross MD    Procedure: Procedure(s):  EGD    Medications prior to admission:   Prior to Admission medications    Medication Sig Start Date End Date Taking? Authorizing Provider   Denosumab (PROLIA SC) Inject into the skin every 6 months   Yes Historical Provider, MD   vitamin B-12 (CYANOCOBALAMIN) 500 MCG tablet Take 500 mcg by mouth in the morning. Yes Historical Provider, MD   celecoxib (CELEBREX) 200 MG capsule Take 200 mg by mouth 2 times daily as needed for Pain   Yes Historical Provider, MD   ondansetron (ZOFRAN) 4 MG tablet Take 4 mg by mouth every 8 hours as needed for Nausea or Vomiting   Yes Historical Provider, MD   calcium carbonate 600 MG TABS tablet Take 1 tablet by mouth daily    Historical Provider, MD   esomeprazole Magnesium (NEXIUM) 20 MG PACK Take 20 mg by mouth daily. Historical Provider, MD   Cholecalciferol (VITAMIN D) 2000 UNITS CAPS capsule Take 2,000 Units by mouth Daily. Historical Provider, MD       Current medications:    Current Facility-Administered Medications   Medication Dose Route Frequency Provider Last Rate Last Admin    lactated ringers infusion   IntraVENous Continuous Darcie Cross MD 30 mL/hr at 22 0741 New Bag at 22 0741    lidocaine 1 % injection 0.1 mL  0.1 mL IntraDERmal Once Darcie Cross MD           Allergies:     Allergies   Allergen Reactions    Adhesive Tape     Baclofen Nausea Only    Gabapentin      Leg cramps    Iodine     Klonopin [Clonazepam]      Fatigue, forgetful,     Microzide [Hydrochlorothiazide]     Phenytoin Sodium Extended     Prochlorperazine Edisylate     Sertraline     Serzone [Nefazodone Hydrochloride]     Other Rash     Skin patches  Reaction is rash and blisters    Sulfa Antibiotics Itching and Rash       Problem List:    Patient Active Problem List   Diagnosis Code    GERD (gastroesophageal reflux disease) K21.9    Fibromyalgia M79.7    Anxiety F41.9    Insomnia G47.00    Osteopenia M85.80    Impaired fasting glucose R73.01    Vitamin D deficiency E55.9    Hypercholesterolemia E78.00    Family history of thyroid disease Z83.49    Pneumonia J18.9    Lumbar facet arthropathy M47.816    Calculus of gallbladder with acute on chronic cholecystitis without obstruction K80.12    Family history of liver cancer Z80.0    Family history of Graves' disease Z83.49       Past Medical History:        Diagnosis Date    Anxiety 2/6/2013    Dyslipidemia 05/03/2013    Fibromyalgia     GERD (gastroesophageal reflux disease)     Hypercholesterolemia 6/11/2014    Osteopenia     Pre-diabetes 05/03/2013       Past Surgical History:        Procedure Laterality Date    CHOLECYSTECTOMY, LAPAROSCOPIC N/A 03/17/2017    COLONOSCOPY  1993    poor prep    COLONOSCOPY  05/03/2013    WNL    COLONOSCOPY  02/2017    EYE SURGERY      GALLBLADDER SURGERY      HYSTERECTOMY (CERVIX STATUS UNKNOWN)      LASIK  10/23/2012    TONSILLECTOMY      UPPER GASTROINTESTINAL ENDOSCOPY  05/03/2013    WNL    UPPER GASTROINTESTINAL ENDOSCOPY  02/2017       Social History:    Social History     Tobacco Use    Smoking status: Never    Smokeless tobacco: Never    Tobacco comments:     smoked for 2 months after high school   Substance Use Topics    Alcohol use:  No                                Counseling given: Not Answered  Tobacco comments: smoked for 2 months after high school      Vital Signs (Current):   Vitals:    07/15/22 1128 07/18/22 0736   BP:  136/82   Pulse:  85   Resp:  16   Temp:  96.9 °F (36.1 °C)   SpO2:  97%   Weight: 132 lb (59.9 kg) 132 lb (59.9 kg)   Height: 5' 7\" (1.702 m) 5' 7\" (1.702 m)                                              BP Readings from Last 3 Encounters:   07/18/22 136/82 01/15/19 121/72   12/28/18 118/66       NPO Status: Time of last liquid consumption: 2000                        Time of last solid consumption: 1800                        Date of last liquid consumption: 07/17/22                        Date of last solid food consumption: 07/17/22    BMI:   Wt Readings from Last 3 Encounters:   07/18/22 132 lb (59.9 kg)   01/15/19 127 lb (57.6 kg)   12/28/18 129 lb (58.5 kg)     Body mass index is 20.67 kg/m². CBC:   Lab Results   Component Value Date/Time    WBC 6.4 01/15/2019 04:57 PM    RBC 4.48 01/15/2019 04:57 PM    HGB 13.4 01/15/2019 04:57 PM    HCT 40.6 01/15/2019 04:57 PM    MCV 90.8 01/15/2019 04:57 PM    RDW 13.8 01/15/2019 04:57 PM     01/15/2019 04:57 PM       CMP:   Lab Results   Component Value Date/Time     01/15/2019 04:57 PM    K 3.5 01/15/2019 04:57 PM    CL 99 01/15/2019 04:57 PM    CO2 25 01/15/2019 04:57 PM    BUN 12 01/15/2019 04:57 PM    CREATININE <0.5 01/15/2019 04:57 PM    GFRAA >60 01/15/2019 04:57 PM    GFRAA >60 05/03/2013 10:35 AM    AGRATIO 1.6 01/15/2019 04:57 PM    LABGLOM >60 01/15/2019 04:57 PM    GLUCOSE 111 01/15/2019 04:57 PM    PROT 7.0 01/15/2019 04:57 PM    PROT 7.7 10/24/2012 09:33 AM    CALCIUM 9.3 01/15/2019 04:57 PM    BILITOT 0.4 01/15/2019 04:57 PM    ALKPHOS 94 01/15/2019 04:57 PM    AST 13 01/15/2019 04:57 PM    ALT 17 01/15/2019 04:57 PM       POC Tests: No results for input(s): POCGLU, POCNA, POCK, POCCL, POCBUN, POCHEMO, POCHCT in the last 72 hours.     Coags: No results found for: PROTIME, INR, APTT    HCG (If Applicable): No results found for: PREGTESTUR, PREGSERUM, HCG, HCGQUANT     ABGs: No results found for: PHART, PO2ART, QYL0SCI, DTC9NNP, BEART, F9SZOFNS     Type & Screen (If Applicable):  No results found for: LABABO, LABRH    Drug/Infectious Status (If Applicable):  No results found for: HIV, HEPCAB    COVID-19 Screening (If Applicable): No results found for: COVID19        Anesthesia Evaluation  Patient summary reviewed and Nursing notes reviewed no history of anesthetic complications:   Airway: Mallampati: II     Neck ROM: full     Dental:          Pulmonary:   (+) pneumonia:                             Cardiovascular:                      Neuro/Psych:   (+) neuromuscular disease:,             GI/Hepatic/Renal:   (+) GERD:,           Endo/Other:                     Abdominal:             Vascular: Other Findings:           Anesthesia Plan      MAC     ASA 3       Induction: intravenous. Anesthetic plan and risks discussed with patient. Plan discussed with CRNA.                     Manuel Wilkse MD   7/18/2022

## 2022-09-05 ENCOUNTER — APPOINTMENT (OUTPATIENT)
Dept: CT IMAGING | Age: 77
End: 2022-09-05
Payer: MEDICARE

## 2022-09-05 ENCOUNTER — APPOINTMENT (OUTPATIENT)
Dept: GENERAL RADIOLOGY | Age: 77
End: 2022-09-05
Payer: MEDICARE

## 2022-09-05 ENCOUNTER — HOSPITAL ENCOUNTER (OUTPATIENT)
Age: 77
Setting detail: OBSERVATION
Discharge: HOME | End: 2022-09-06
Attending: STUDENT IN AN ORGANIZED HEALTH CARE EDUCATION/TRAINING PROGRAM | Admitting: INTERNAL MEDICINE
Payer: MEDICARE

## 2022-09-05 DIAGNOSIS — R07.9 CHEST PAIN, UNSPECIFIED TYPE: Primary | ICD-10-CM

## 2022-09-05 PROBLEM — R06.02 SOB (SHORTNESS OF BREATH): Status: ACTIVE | Noted: 2022-09-05

## 2022-09-05 LAB
A/G RATIO: 1.8 (ref 1.1–2.2)
ALBUMIN SERPL-MCNC: 4.7 G/DL (ref 3.4–5)
ALP BLD-CCNC: 75 U/L (ref 40–129)
ALT SERPL-CCNC: 13 U/L (ref 10–40)
ANION GAP SERPL CALCULATED.3IONS-SCNC: 13 MMOL/L (ref 3–16)
AST SERPL-CCNC: 14 U/L (ref 15–37)
BASOPHILS ABSOLUTE: 0 K/UL (ref 0–0.2)
BASOPHILS RELATIVE PERCENT: 0.5 %
BILIRUB SERPL-MCNC: 0.6 MG/DL (ref 0–1)
BUN BLDV-MCNC: 14 MG/DL (ref 7–20)
CALCIUM SERPL-MCNC: 9.4 MG/DL (ref 8.3–10.6)
CHLORIDE BLD-SCNC: 100 MMOL/L (ref 99–110)
CO2: 24 MMOL/L (ref 21–32)
CREAT SERPL-MCNC: 0.6 MG/DL (ref 0.6–1.2)
EKG ATRIAL RATE: 102 BPM
EKG DIAGNOSIS: NORMAL
EKG P AXIS: 22 DEGREES
EKG P-R INTERVAL: 142 MS
EKG Q-T INTERVAL: 336 MS
EKG QRS DURATION: 64 MS
EKG QTC CALCULATION (BAZETT): 437 MS
EKG R AXIS: -14 DEGREES
EKG T AXIS: 23 DEGREES
EKG VENTRICULAR RATE: 102 BPM
EOSINOPHILS ABSOLUTE: 0.3 K/UL (ref 0–0.6)
EOSINOPHILS RELATIVE PERCENT: 6.3 %
GFR AFRICAN AMERICAN: >60
GFR NON-AFRICAN AMERICAN: >60
GLUCOSE BLD-MCNC: 101 MG/DL (ref 70–99)
HCT VFR BLD CALC: 39.1 % (ref 36–48)
HEMOGLOBIN: 13.3 G/DL (ref 12–16)
LYMPHOCYTES ABSOLUTE: 1.3 K/UL (ref 1–5.1)
LYMPHOCYTES RELATIVE PERCENT: 23.7 %
MAGNESIUM: 2.3 MG/DL (ref 1.8–2.4)
MCH RBC QN AUTO: 30.1 PG (ref 26–34)
MCHC RBC AUTO-ENTMCNC: 33.9 G/DL (ref 31–36)
MCV RBC AUTO: 89 FL (ref 80–100)
MONOCYTES ABSOLUTE: 0.4 K/UL (ref 0–1.3)
MONOCYTES RELATIVE PERCENT: 7.3 %
NEUTROPHILS ABSOLUTE: 3.3 K/UL (ref 1.7–7.7)
NEUTROPHILS RELATIVE PERCENT: 62.2 %
PDW BLD-RTO: 14.3 % (ref 12.4–15.4)
PLATELET # BLD: 197 K/UL (ref 135–450)
PMV BLD AUTO: 7 FL (ref 5–10.5)
POTASSIUM REFLEX MAGNESIUM: 3.5 MMOL/L (ref 3.5–5.1)
PRO-BNP: 1135 PG/ML (ref 0–449)
RBC # BLD: 4.4 M/UL (ref 4–5.2)
SARS-COV-2, NAAT: NOT DETECTED
SODIUM BLD-SCNC: 137 MMOL/L (ref 136–145)
TOTAL PROTEIN: 7.3 G/DL (ref 6.4–8.2)
TROPONIN: <0.01 NG/ML
TROPONIN: <0.01 NG/ML
WBC # BLD: 5.3 K/UL (ref 4–11)

## 2022-09-05 PROCEDURE — 6360000004 HC RX CONTRAST MEDICATION: Performed by: PHYSICIAN ASSISTANT

## 2022-09-05 PROCEDURE — 6370000000 HC RX 637 (ALT 250 FOR IP): Performed by: PHYSICIAN ASSISTANT

## 2022-09-05 PROCEDURE — 83036 HEMOGLOBIN GLYCOSYLATED A1C: CPT

## 2022-09-05 PROCEDURE — 99285 EMERGENCY DEPT VISIT HI MDM: CPT

## 2022-09-05 PROCEDURE — 2580000003 HC RX 258: Performed by: PHYSICIAN ASSISTANT

## 2022-09-05 PROCEDURE — 71260 CT THORAX DX C+: CPT | Performed by: PHYSICIAN ASSISTANT

## 2022-09-05 PROCEDURE — 71046 X-RAY EXAM CHEST 2 VIEWS: CPT

## 2022-09-05 PROCEDURE — 6370000000 HC RX 637 (ALT 250 FOR IP): Performed by: NURSE PRACTITIONER

## 2022-09-05 PROCEDURE — 96361 HYDRATE IV INFUSION ADD-ON: CPT

## 2022-09-05 PROCEDURE — G0378 HOSPITAL OBSERVATION PER HR: HCPCS

## 2022-09-05 PROCEDURE — 96360 HYDRATION IV INFUSION INIT: CPT

## 2022-09-05 PROCEDURE — 36415 COLL VENOUS BLD VENIPUNCTURE: CPT

## 2022-09-05 PROCEDURE — 84484 ASSAY OF TROPONIN QUANT: CPT

## 2022-09-05 PROCEDURE — 6360000002 HC RX W HCPCS: Performed by: PHYSICIAN ASSISTANT

## 2022-09-05 PROCEDURE — 93005 ELECTROCARDIOGRAM TRACING: CPT | Performed by: PHYSICIAN ASSISTANT

## 2022-09-05 PROCEDURE — 93010 ELECTROCARDIOGRAM REPORT: CPT | Performed by: INTERNAL MEDICINE

## 2022-09-05 PROCEDURE — 87635 SARS-COV-2 COVID-19 AMP PRB: CPT

## 2022-09-05 PROCEDURE — 85025 COMPLETE CBC W/AUTO DIFF WBC: CPT

## 2022-09-05 PROCEDURE — 83880 ASSAY OF NATRIURETIC PEPTIDE: CPT

## 2022-09-05 PROCEDURE — 80053 COMPREHEN METABOLIC PANEL: CPT

## 2022-09-05 PROCEDURE — 96374 THER/PROPH/DIAG INJ IV PUSH: CPT

## 2022-09-05 PROCEDURE — 83735 ASSAY OF MAGNESIUM: CPT

## 2022-09-05 RX ORDER — ENOXAPARIN SODIUM 100 MG/ML
40 INJECTION SUBCUTANEOUS DAILY
Status: DISCONTINUED | OUTPATIENT
Start: 2022-09-06 | End: 2022-09-06 | Stop reason: HOSPADM

## 2022-09-05 RX ORDER — SODIUM CHLORIDE 0.9 % (FLUSH) 0.9 %
5-40 SYRINGE (ML) INJECTION PRN
Status: DISCONTINUED | OUTPATIENT
Start: 2022-09-05 | End: 2022-09-06 | Stop reason: HOSPADM

## 2022-09-05 RX ORDER — ACETAMINOPHEN 325 MG/1
650 TABLET ORAL EVERY 6 HOURS PRN
Status: DISCONTINUED | OUTPATIENT
Start: 2022-09-05 | End: 2022-09-06 | Stop reason: HOSPADM

## 2022-09-05 RX ORDER — SODIUM CHLORIDE 9 MG/ML
INJECTION, SOLUTION INTRAVENOUS PRN
Status: DISCONTINUED | OUTPATIENT
Start: 2022-09-05 | End: 2022-09-06 | Stop reason: HOSPADM

## 2022-09-05 RX ORDER — CALCIUM CARBONATE 500(1250)
500 TABLET ORAL DAILY
Status: DISCONTINUED | OUTPATIENT
Start: 2022-09-06 | End: 2022-09-06 | Stop reason: HOSPADM

## 2022-09-05 RX ORDER — ASPIRIN 81 MG/1
324 TABLET, CHEWABLE ORAL ONCE
Status: COMPLETED | OUTPATIENT
Start: 2022-09-05 | End: 2022-09-05

## 2022-09-05 RX ORDER — NITROFURANTOIN 25; 75 MG/1; MG/1
100 CAPSULE ORAL EVERY 12 HOURS SCHEDULED
Status: DISCONTINUED | OUTPATIENT
Start: 2022-09-05 | End: 2022-09-06 | Stop reason: HOSPADM

## 2022-09-05 RX ORDER — VITAMIN B COMPLEX
2000 TABLET ORAL DAILY
Status: DISCONTINUED | OUTPATIENT
Start: 2022-09-06 | End: 2022-09-06 | Stop reason: HOSPADM

## 2022-09-05 RX ORDER — MORPHINE SULFATE 2 MG/ML
2 INJECTION, SOLUTION INTRAMUSCULAR; INTRAVENOUS EVERY 4 HOURS PRN
Status: DISCONTINUED | OUTPATIENT
Start: 2022-09-05 | End: 2022-09-06 | Stop reason: HOSPADM

## 2022-09-05 RX ORDER — POLYETHYLENE GLYCOL 3350 17 G/17G
17 POWDER, FOR SOLUTION ORAL DAILY PRN
Status: DISCONTINUED | OUTPATIENT
Start: 2022-09-05 | End: 2022-09-06 | Stop reason: HOSPADM

## 2022-09-05 RX ORDER — ONDANSETRON 2 MG/ML
4 INJECTION INTRAMUSCULAR; INTRAVENOUS EVERY 6 HOURS PRN
Status: DISCONTINUED | OUTPATIENT
Start: 2022-09-05 | End: 2022-09-06 | Stop reason: HOSPADM

## 2022-09-05 RX ORDER — 0.9 % SODIUM CHLORIDE 0.9 %
1000 INTRAVENOUS SOLUTION INTRAVENOUS ONCE
Status: COMPLETED | OUTPATIENT
Start: 2022-09-05 | End: 2022-09-05

## 2022-09-05 RX ORDER — SODIUM CHLORIDE 0.9 % (FLUSH) 0.9 %
5-40 SYRINGE (ML) INJECTION EVERY 12 HOURS SCHEDULED
Status: DISCONTINUED | OUTPATIENT
Start: 2022-09-05 | End: 2022-09-06 | Stop reason: HOSPADM

## 2022-09-05 RX ORDER — ONDANSETRON 4 MG/1
4 TABLET, ORALLY DISINTEGRATING ORAL EVERY 8 HOURS PRN
Status: DISCONTINUED | OUTPATIENT
Start: 2022-09-05 | End: 2022-09-06 | Stop reason: HOSPADM

## 2022-09-05 RX ORDER — MORPHINE SULFATE 4 MG/ML
4 INJECTION, SOLUTION INTRAMUSCULAR; INTRAVENOUS ONCE
Status: COMPLETED | OUTPATIENT
Start: 2022-09-05 | End: 2022-09-05

## 2022-09-05 RX ORDER — ACETAMINOPHEN 650 MG/1
650 SUPPOSITORY RECTAL EVERY 6 HOURS PRN
Status: DISCONTINUED | OUTPATIENT
Start: 2022-09-05 | End: 2022-09-06 | Stop reason: HOSPADM

## 2022-09-05 RX ORDER — CHOLECALCIFEROL (VITAMIN D3) 125 MCG
500 CAPSULE ORAL DAILY
Status: DISCONTINUED | OUTPATIENT
Start: 2022-09-06 | End: 2022-09-06 | Stop reason: HOSPADM

## 2022-09-05 RX ORDER — NITROGLYCERIN 0.4 MG/1
0.4 TABLET SUBLINGUAL EVERY 5 MIN PRN
Status: DISCONTINUED | OUTPATIENT
Start: 2022-09-05 | End: 2022-09-06 | Stop reason: HOSPADM

## 2022-09-05 RX ORDER — PANTOPRAZOLE SODIUM 40 MG/1
40 TABLET, DELAYED RELEASE ORAL DAILY
Status: DISCONTINUED | OUTPATIENT
Start: 2022-09-06 | End: 2022-09-06 | Stop reason: HOSPADM

## 2022-09-05 RX ORDER — LORAZEPAM 0.5 MG/1
0.5 TABLET ORAL ONCE
Status: COMPLETED | OUTPATIENT
Start: 2022-09-05 | End: 2022-09-05

## 2022-09-05 RX ADMIN — SODIUM CHLORIDE 1000 ML: 9 INJECTION, SOLUTION INTRAVENOUS at 19:07

## 2022-09-05 RX ADMIN — MORPHINE SULFATE 4 MG: 4 INJECTION, SOLUTION INTRAMUSCULAR; INTRAVENOUS at 20:57

## 2022-09-05 RX ADMIN — IOPAMIDOL 75 ML: 755 INJECTION, SOLUTION INTRAVENOUS at 19:39

## 2022-09-05 RX ADMIN — NITROGLYCERIN 0.5 INCH: 20 OINTMENT TOPICAL at 20:32

## 2022-09-05 RX ADMIN — LORAZEPAM 0.5 MG: 0.5 TABLET ORAL at 19:00

## 2022-09-05 RX ADMIN — ASPIRIN 324 MG: 81 TABLET, CHEWABLE ORAL at 20:32

## 2022-09-05 RX ADMIN — NITROFURANTOIN (MONOHYDRATE/MACROCRYSTALS) 100 MG: 75; 25 CAPSULE ORAL at 22:00

## 2022-09-05 ASSESSMENT — PAIN - FUNCTIONAL ASSESSMENT
PAIN_FUNCTIONAL_ASSESSMENT: NONE - DENIES PAIN
PAIN_FUNCTIONAL_ASSESSMENT: NONE - DENIES PAIN

## 2022-09-05 ASSESSMENT — PAIN SCALES - GENERAL
PAINLEVEL_OUTOF10: 6
PAINLEVEL_OUTOF10: 4
PAINLEVEL_OUTOF10: 8

## 2022-09-05 ASSESSMENT — PAIN DESCRIPTION - LOCATION
LOCATION: CHEST
LOCATION: CHEST
LOCATION: CHEST;BACK

## 2022-09-05 NOTE — ED NOTES
Pt ambulates too and from restroom. Pt's gait even and steady. Pt back to bed, call light in reach, side rails up x2, VS monitors put back into place. Pt and  deny needs at this time.      Anai Cox RN  09/05/22 [id number]

## 2022-09-05 NOTE — ED PROVIDER NOTES
I independently performed a history and physical on [address] [facility]. All diagnostic, treatment, and disposition decisions were made by myself in conjunction with the advanced practice provider/resident. Labs Reviewed   COMPREHENSIVE METABOLIC PANEL W/ REFLEX TO MG FOR LOW K - Abnormal; Notable for the following components:       Result Value    Glucose 101 (*)     AST 14 (*)     All other components within normal limits   BRAIN NATRIURETIC PEPTIDE - Abnormal; Notable for the following components:    Pro-BNP 1,135 (*)     All other components within normal limits   COVID-19, RAPID   CBC WITH AUTO DIFFERENTIAL   TROPONIN   MAGNESIUM   TROPONIN     CT CHEST PULMONARY EMBOLISM W CONTRAST   Final Result   No evidence of pulmonary embolism or acute pulmonary abnormality. XR CHEST (2 VW)   Final Result   No acute cardiopulmonary findings. For further details of [facility] emergency department encounter, please see the XIANG/resident's documentation. I personally saw the patient and performed a substantive portion of the visit including all aspects of the medical decision making. Briefly, this is a 68-year-old female, presenting with intermittent chest pain and shortness of breath over the past week. Patient states that her pain acutely worsened last night which is what caused her to come to the ER today. She states that it is worse when she takes a deep breath. She states it is in the center of her chest.  Labs here are reassuring. CT with PE protocol without any acute concerning findings. Patient will be hospitalized for further work-up and treatment of her chest pain. I personally saw the patient and independently provided 0 minutes of non-concurrent critical care out of the total shared critical care time provided.      1. Chest pain, unspecified type      Comment: Please note this report has been produced using speech recognition software and may contain errors related to that system including errors in grammar, punctuation, and spelling, as well as words and phrases that may be inappropriate. If there are any questions or concerns please feel free to contact the dictating provider for clarification. The Ekg interpreted by me shows  sinus tachycardia, vrnb=[id number]  Axis is   Left axis deviation  QTc is  normal  Intervals and Durations are unremarkable.       ST Segments: nonspecific changes, Q waves inferior leads  No significant change from prior EKG dated 1/15/2019         Lalo Zhu MD  09/05/22 Chuck 26 Alvin Dejesus MD  09/05/22 [id number]

## 2022-09-05 NOTE — ED PROVIDER NOTES
[facility] Emergency Department    CHIEF COMPLAINT  Shortness of Breath (Onset last night. Worse with standing. Denies respiratory or smoking Hx. )      SHARED SERVICE VISIT  I have seen and evaluated this patient with my supervising physician, Dr. Kat Lazo. HISTORY OF PRESENT ILLNESS  Reji Keane is a 78 y.o. female who presents to the ED complaining of shortness of breath. Patient brought in by  today for evaluation. Patient states that she began with some symptoms several days ago. Worsening last night. States that she has had mild cough. Nonproductive. No hemoptysis. Denies any fevers or chills. No neck, arm, jaw or back pain. Denies headaches, lightheadedness, dizziness confusion. No abdominal discomfort. No nausea, vomiting or diarrhea. Currently on treatment for urinary tract infection. She denies any leg pain or swelling. No sick contacts. No other complaints, modifying factors or associated symptoms. Nursing notes reviewed.    Past Medical History:   Diagnosis Date    Anxiety 2/6/2013    Dyslipidemia 05/03/2013    Fibromyalgia     GERD (gastroesophageal reflux disease)     Hypercholesterolemia 6/11/2014    Osteopenia     Pre-diabetes 05/03/2013     Past Surgical History:   Procedure Laterality Date    CHOLECYSTECTOMY, LAPAROSCOPIC N/A 03/17/2017    COLONOSCOPY  1993    poor prep    COLONOSCOPY  05/03/2013    WNL    COLONOSCOPY  02/2017    EYE SURGERY      GALLBLADDER SURGERY      HYSTERECTOMY (CERVIX STATUS UNKNOWN)      LASIK  10/23/2012    TONSILLECTOMY      UPPER GASTROINTESTINAL ENDOSCOPY  05/03/2013    WNL    UPPER GASTROINTESTINAL ENDOSCOPY  02/2017    UPPER GASTROINTESTINAL ENDOSCOPY N/A 7/18/2022    EGD BIOPSY performed by Nataliia Jacinto MD at [address] [facility]  7/18/2022    EGD ESOPHAGOGASTRODUODENOSCOPY DILATATION performed by Nataliia Jacinto MD at [address] [facility] History   Problem Baclofen Nausea Only    Compazine [Prochlorperazine] Other (See Comments)     Jaw probs    Gabapentin      Leg cramps    Iodine     Klonopin [Clonazepam]      Fatigue, forgetful,     Microzide [Hydrochlorothiazide]     Phenytoin Sodium Extended     Prochlorperazine Edisylate     Sertraline     Serzone [Nefazodone Hydrochloride]     Other Rash     Skin patches  Reaction is rash and blisters    Sulfa Antibiotics Itching and Rash       REVIEW OF SYSTEMS  10 systems reviewed, pertinent positives per HPI otherwise noted to be negative    PHYSICAL EXAM  /68   Pulse 86   Temp 99.1 °F (37.3 °C) (Oral)   Resp 20   SpO2 98%   GENERAL APPEARANCE: Awake and alert. Cooperative. No acute distress. HEAD: Normocephalic. Atraumatic. EYES: PERRL. EOM's grossly intact. ENT: Mucous membranes are moist.   NECK: Supple. No JVD. No tracheal tenderness or deviation. No crepitus. HEART: RRR. No murmurs. No chest wall tenderness. LUNGS: Respirations unlabored. CTAB. Good air exchange. Speaking comfortably in full sentences. No wheezing, rhonchi, rales. ABDOMEN: Soft. Non-distended. Non-tender. No guarding or rebound. No midline pulsatile mass. EXTREMITIES: No peripheral edema. No unilateral calf pain, redness or swelling. Moves all extremities equally. All extremities neurovascularly intact. SKIN: Warm and dry. No acute rashes. NEUROLOGICAL: Alert and oriented. CN's 2-12 intact. No gross facial drooping. Strength 5/5, sensation intact. PSYCHIATRIC: Normal mood and affect. RADIOLOGY  XR CHEST (2 VW)    Result Date: 9/5/2022  EXAMINATION: TWO XRAY VIEWS OF THE CHEST 9/5/2022 2:23 pm COMPARISON: None. HISTORY: ORDERING SYSTEM PROVIDED HISTORY: #BreathingProbs TECHNOLOGIST PROVIDED HISTORY: Reason for exam:->#BreathingProbs Reason for Exam: #BreathingProbs FINDINGS: No acute airspace infiltrate. No pneumothorax or pleural effusion. Normal cardiomediastinal silhouette     No acute cardiopulmonary findings. CT CHEST PULMONARY EMBOLISM W CONTRAST    Result Date: 9/5/2022  EXAMINATION: CTA OF THE CHEST 9/5/2022 7:30 pm TECHNIQUE: CTA of the chest was performed after the administration of intravenous contrast.  Multiplanar reformatted images are provided for review. MIP images are provided for review. Automated exposure control, iterative reconstruction, and/or weight based adjustment of the mA/kV was utilized to reduce the radiation dose to as low as reasonably achievable. COMPARISON: None. HISTORY: ORDERING SYSTEM PROVIDED HISTORY: cp/sob TECHNOLOGIST PROVIDED HISTORY: Reason for exam:->cp/sob Decision Support Exception - unselect if not a suspected or confirmed emergency medical condition->Emergency Medical Condition (MA) Reason for Exam: sob increasing over several weeks, worse at night Relevant Medical/Surgical History: non smoker, lumpectomy FINDINGS: Pulmonary Arteries: Pulmonary arteries are adequately opacified for evaluation. No evidence of intraluminal filling defect to suggest pulmonary embolism. Main pulmonary artery is normal in caliber. Mediastinum: No evidence of mediastinal lymphadenopathy. The heart and pericardium demonstrate no acute abnormality. There is no acute abnormality of the thoracic aorta. Lungs/pleura: The lungs are without acute process. No focal consolidation or pulmonary edema. No evidence of pleural effusion or pneumothorax. Upper Abdomen: Limited images of the upper abdomen are unremarkable. Soft Tissues/Bones: No acute bone or soft tissue abnormality. No evidence of pulmonary embolism or acute pulmonary abnormality. ED COURSE  Pain control was not initially required while here in the emergency department. Triage vitals stable. CBC without leukocytosis or anemia. CMP unremarkable. Troponin less than 0.01. Rapid COVID was negative. BNP of approximately 1100 with a magnesium of 2.3. EKG was consistent with a sinus tachycardia at a rate of 102.   Nonspecific changes noted without evidence for acute ischemia. Stable portable chest x-ray. Patient with ongoing pain. Given dose of aspirin and nitro. Did obtain of CT PE study as patient reports chest pain and shortness of breath worsening and pain radiating into the back. CT PE study was negative for pulmonary embolus or other acute intrathoracic pathology. Repeat troponin negative. Given symptomology we are at this time recommending admission for further cardiac evaluation. Case discussed with hospital medicine regarding admission and orders placed. A discussion was had with Ms. Benitez regarding chest pain, ED findings and recommendations for admission. Risk management discussed and shared decision making had with patient and/or surrogate. All questions were answered. Patient is in agreement.     MDM  Results for orders placed or performed during the hospital encounter of 09/05/22   COVID-19, Rapid    Specimen: Nasopharyngeal Swab   Result Value Ref Range    SARS-CoV-2, NAAT Not Detected Not Detected   CBC with Auto Differential   Result Value Ref Range    WBC 5.3 4.0 - 11.0 K/uL    RBC 4.40 4.00 - 5.20 M/uL    Hemoglobin 13.3 12.0 - 16.0 g/dL    Hematocrit 39.1 36.0 - 48.0 %    MCV 89.0 80.0 - 100.0 fL    MCH 30.1 26.0 - 34.0 pg    MCHC 33.9 31.0 - 36.0 g/dL    RDW 14.3 12.4 - 15.4 %    Platelets 351 950 - 244 K/uL    MPV 7.0 5.0 - 10.5 fL    Neutrophils % 62.2 %    Lymphocytes % 23.7 %    Monocytes % 7.3 %    Eosinophils % 6.3 %    Basophils % 0.5 %    Neutrophils Absolute 3.3 1.7 - 7.7 K/uL    Lymphocytes Absolute 1.3 1.0 - 5.1 K/uL    Monocytes Absolute 0.4 0.0 - 1.3 K/uL    Eosinophils Absolute 0.3 0.0 - 0.6 K/uL    Basophils Absolute 0.0 0.0 - 0.2 K/uL   Comprehensive Metabolic Panel w/ Reflex to MG   Result Value Ref Range    Sodium 137 136 - 145 mmol/L    Potassium reflex Magnesium 3.5 3.5 - 5.1 mmol/L    Chloride 100 99 - 110 mmol/L    CO2 24 21 - 32 mmol/L    Anion Gap 13 3 - 16    Glucose 101 (H) 70 - 99 mg/dL    BUN 14 7 - 20 mg/dL    Creatinine 0.6 0.6 - 1.2 mg/dL    GFR Non-African American >60 >60    GFR African American >60 >60    Calcium 9.4 8.3 - 10.6 mg/dL    Total Protein 7.3 6.4 - 8.2 g/dL    Albumin 4.7 3.4 - 5.0 g/dL    Albumin/Globulin Ratio 1.8 1.1 - 2.2    Total Bilirubin 0.6 0.0 - 1.0 mg/dL    Alkaline Phosphatase 75 40 - 129 U/L    ALT 13 10 - 40 U/L    AST 14 (L) 15 - 37 U/L   Troponin   Result Value Ref Range    Troponin <0.01 <0.01 ng/mL   Brain Natriuretic Peptide   Result Value Ref Range    Pro-BNP 1,135 (H) 0 - 449 pg/mL   Magnesium   Result Value Ref Range    Magnesium 2.30 1.80 - 2.40 mg/dL   Troponin   Result Value Ref Range    Troponin <0.01 <0.01 ng/mL   EKG 12 Lead   Result Value Ref Range    Ventricular Rate 102 BPM    Atrial Rate 102 BPM    P-R Interval 142 ms    QRS Duration 64 ms    Q-T Interval 336 ms    QTc Calculation (Bazett) 437 ms    P Axis 22 degrees    R Axis -14 degrees    T Axis 23 degrees    Diagnosis       Sinus tachycardiaLow voltage QRST wave abnormality, consider anterior ischemiaConfirmed by Amber Fong ([id number]) on 9/5/2022 2:52:56 PM     I spoke with Andrea Villagran and Jr Wells. We thoroughly discussed the history, physical exam, laboratory and imaging studies, as well as, emergency department course. Based upon that discussion, we've decided to admit Jorge Queen to the hospital for further observation, evaluation, and treatment. Final Impression  1. Chest pain, unspecified type      Blood pressure 129/72, pulse 82, temperature 99.1 °F (37.3 °C), temperature source Oral, resp. rate 19, SpO2 100 %, not currently breastfeeding. DISPOSITION  Patient was admitted to the hospital in stable condition.          Bay Pryor [place]  09/05/22 2051

## 2022-09-06 VITALS
WEIGHT: 137.5 LBS | SYSTOLIC BLOOD PRESSURE: 119 MMHG | BODY MASS INDEX: 21.58 KG/M2 | OXYGEN SATURATION: 99 % | DIASTOLIC BLOOD PRESSURE: 62 MMHG | RESPIRATION RATE: 16 BRPM | TEMPERATURE: 97.5 F | HEIGHT: 67 IN | HEART RATE: 77 BPM

## 2022-09-06 PROBLEM — R07.9 CHEST PAIN: Status: ACTIVE | Noted: 2022-09-06

## 2022-09-06 LAB
C-REACTIVE PROTEIN: 30.5 MG/L (ref 0–5.1)
CHOLESTEROL, TOTAL: 201 MG/DL (ref 0–199)
EKG ATRIAL RATE: 91 BPM
EKG DIAGNOSIS: NORMAL
EKG P AXIS: 12 DEGREES
EKG P-R INTERVAL: 152 MS
EKG Q-T INTERVAL: 382 MS
EKG QRS DURATION: 68 MS
EKG QTC CALCULATION (BAZETT): 469 MS
EKG R AXIS: -21 DEGREES
EKG T AXIS: 8 DEGREES
EKG VENTRICULAR RATE: 91 BPM
ESTIMATED AVERAGE GLUCOSE: 111.2 MG/DL
HBA1C MFR BLD: 5.5 %
HDLC SERPL-MCNC: 50 MG/DL (ref 40–60)
LDL CHOLESTEROL CALCULATED: 132 MG/DL
LV EF: 55 %
LVEF MODALITY: NORMAL
SEDIMENTATION RATE, ERYTHROCYTE: 13 MM/HR (ref 0–30)
TRIGL SERPL-MCNC: 94 MG/DL (ref 0–150)
TROPONIN: <0.01 NG/ML
TROPONIN: <0.01 NG/ML
VLDLC SERPL CALC-MCNC: 19 MG/DL

## 2022-09-06 PROCEDURE — 86140 C-REACTIVE PROTEIN: CPT

## 2022-09-06 PROCEDURE — 6370000000 HC RX 637 (ALT 250 FOR IP): Performed by: NURSE PRACTITIONER

## 2022-09-06 PROCEDURE — 85652 RBC SED RATE AUTOMATED: CPT

## 2022-09-06 PROCEDURE — 99204 OFFICE O/P NEW MOD 45 MIN: CPT | Performed by: INTERNAL MEDICINE

## 2022-09-06 PROCEDURE — 84484 ASSAY OF TROPONIN QUANT: CPT

## 2022-09-06 PROCEDURE — 96375 TX/PRO/DX INJ NEW DRUG ADDON: CPT

## 2022-09-06 PROCEDURE — G0378 HOSPITAL OBSERVATION PER HR: HCPCS

## 2022-09-06 PROCEDURE — 96376 TX/PRO/DX INJ SAME DRUG ADON: CPT

## 2022-09-06 PROCEDURE — 6360000002 HC RX W HCPCS

## 2022-09-06 PROCEDURE — 6370000000 HC RX 637 (ALT 250 FOR IP): Performed by: INTERNAL MEDICINE

## 2022-09-06 PROCEDURE — 36415 COLL VENOUS BLD VENIPUNCTURE: CPT

## 2022-09-06 PROCEDURE — 2580000003 HC RX 258: Performed by: INTERNAL MEDICINE

## 2022-09-06 PROCEDURE — 93306 TTE W/DOPPLER COMPLETE: CPT

## 2022-09-06 PROCEDURE — 80061 LIPID PANEL: CPT

## 2022-09-06 PROCEDURE — 6360000002 HC RX W HCPCS: Performed by: INTERNAL MEDICINE

## 2022-09-06 PROCEDURE — 93005 ELECTROCARDIOGRAM TRACING: CPT | Performed by: INTERNAL MEDICINE

## 2022-09-06 RX ORDER — KETOROLAC TROMETHAMINE 30 MG/ML
15 INJECTION, SOLUTION INTRAMUSCULAR; INTRAVENOUS ONCE
Status: COMPLETED | OUTPATIENT
Start: 2022-09-06 | End: 2022-09-06

## 2022-09-06 RX ORDER — ATORVASTATIN CALCIUM 40 MG/1
40 TABLET, FILM COATED ORAL NIGHTLY
Status: DISCONTINUED | OUTPATIENT
Start: 2022-09-06 | End: 2022-09-06 | Stop reason: HOSPADM

## 2022-09-06 RX ORDER — KETOROLAC TROMETHAMINE 10 MG/1
10 TABLET, FILM COATED ORAL EVERY 6 HOURS PRN
Qty: 20 TABLET | Refills: 0 | Status: SHIPPED | OUTPATIENT
Start: 2022-09-06 | End: 2022-09-11

## 2022-09-06 RX ORDER — ATORVASTATIN CALCIUM 40 MG/1
40 TABLET, FILM COATED ORAL NIGHTLY
Qty: 30 TABLET | Refills: 0 | Status: SHIPPED | OUTPATIENT
Start: 2022-09-06

## 2022-09-06 RX ORDER — DOBUTAMINE HYDROCHLORIDE 200 MG/100ML
5 INJECTION INTRAVENOUS CONTINUOUS
Status: DISCONTINUED | OUTPATIENT
Start: 2022-09-06 | End: 2022-09-06

## 2022-09-06 RX ORDER — NITROFURANTOIN 25; 75 MG/1; MG/1
100 CAPSULE ORAL EVERY 12 HOURS SCHEDULED
Qty: 6 CAPSULE | Refills: 0 | Status: SHIPPED | OUTPATIENT
Start: 2022-09-06 | End: 2022-09-09

## 2022-09-06 RX ADMIN — Medication 2000 UNITS: at 07:44

## 2022-09-06 RX ADMIN — CALCIUM 500 MG: 500 TABLET ORAL at 07:44

## 2022-09-06 RX ADMIN — KETOROLAC TROMETHAMINE 15 MG: 30 INJECTION, SOLUTION INTRAMUSCULAR; INTRAVENOUS at 11:30

## 2022-09-06 RX ADMIN — CYANOCOBALAMIN TAB 500 MCG 500 MCG: 500 TAB at 07:44

## 2022-09-06 RX ADMIN — MORPHINE SULFATE 2 MG: 2 INJECTION, SOLUTION INTRAMUSCULAR; INTRAVENOUS at 04:06

## 2022-09-06 RX ADMIN — Medication 10 ML: at 08:10

## 2022-09-06 RX ADMIN — NITROFURANTOIN (MONOHYDRATE/MACROCRYSTALS) 100 MG: 75; 25 CAPSULE ORAL at 11:31

## 2022-09-06 RX ADMIN — NITROGLYCERIN 0.4 MG: 0.4 TABLET, ORALLY DISINTEGRATING SUBLINGUAL at 08:08

## 2022-09-06 ASSESSMENT — PAIN SCALES - GENERAL
PAINLEVEL_OUTOF10: 8
PAINLEVEL_OUTOF10: 6
PAINLEVEL_OUTOF10: 3
PAINLEVEL_OUTOF10: 5
PAINLEVEL_OUTOF10: 6
PAINLEVEL_OUTOF10: 4

## 2022-09-06 ASSESSMENT — PAIN DESCRIPTION - LOCATION
LOCATION: CHEST

## 2022-09-06 NOTE — CONSULTS
[address] [facility]  [phone]      Attending Physician: Edwar Rodríguez MD  Reason for Consultation/Chief Complaint: CP    Subjective   History of Present Illness:  Kip Hartman is a 78 y.o. patient who presented to the hospital with complaints of SOB- states very hard to breathe and this caused her CP. Gradually getting worse over a week. Stated over weekend was severe. Better after she sleeps and during the day it builds. Doesn't feel like shes getting enough oxygen, hurts to take a deep breath,  been coughing more. Non productive. No fevers, shakes, chills. ++ constant nausea. Standing and walking brings it on, stops doing dishes or cooking. Laying flat makes it better. Hurts more more on back left shoulder blade and constant. States hx of fibromyalgiaa dx at [employer]. Doesn't take celebrex often    Past Medical History:   has a past medical history of Anxiety, Dyslipidemia, Fibromyalgia, GERD (gastroesophageal reflux disease), Hypercholesterolemia, Osteopenia, and Pre-diabetes. Surgical History:   has a past surgical history that includes Hysterectomy; Tonsillectomy; eye surgery; LASIK (10/23/2012); Upper gastrointestinal endoscopy (05/03/2013); Colonoscopy (1993); Colonoscopy (05/03/2013); Colonoscopy (02/2017); Upper gastrointestinal endoscopy (02/2017); Cholecystectomy, laparoscopic (N/A, 03/17/2017); Gallbladder surgery; Upper gastrointestinal endoscopy (N/A, 7/18/2022); and Upper gastrointestinal endoscopy (7/18/2022). Social History:   reports that she has never smoked. She has never used smokeless tobacco. She reports that she does not drink alcohol and does not use drugs. Family History:  family history includes Heart Disease in her brother, father, and mother. Home Medications:  Were reviewed and are listed in nursing record and/or below  Prior to Admission medications    Medication Sig Start Date End Date Taking?  Authorizing Provider   Denosumab (PROLIA SC) Inject into the skin every 6 months    Historical Provider, MD   vitamin B-12 (CYANOCOBALAMIN) 500 MCG tablet Take 500 mcg by mouth in the morning. Historical Provider, MD   celecoxib (CELEBREX) 200 MG capsule Take 200 mg by mouth 2 times daily as needed for Pain    Historical Provider, MD   ondansetron (ZOFRAN) 4 MG tablet Take 4 mg by mouth every 8 hours as needed for Nausea or Vomiting    Historical Provider, MD   calcium carbonate 600 MG TABS tablet Take 1 tablet by mouth daily    Historical Provider, MD   esomeprazole Magnesium (NEXIUM) 20 MG PACK Take 20 mg by mouth daily. Historical Provider, MD   Cholecalciferol (VITAMIN D) 2000 UNITS CAPS capsule Take 2,000 Units by mouth Daily.     Historical Provider, MD        CURRENT Medications:  perflutren lipid microspheres (DEFINITY) injection 1.65 mg, ONCE PRN  DOBUTamine (DOBUTREX) (for STRESS TEST USE ONLY), Continuous  atorvastatin (LIPITOR) tablet 40 mg, Nightly  perflutren lipid microspheres (DEFINITY) injection 1.65 mg, ONCE PRN  calcium elemental (OSCAL) tablet 500 mg, Daily  Vitamin D (CHOLECALCIFEROL) tablet 2,000 Units, Daily  pantoprazole (PROTONIX) tablet 40 mg, Daily  vitamin B-12 (CYANOCOBALAMIN) tablet 500 mcg, Daily  sodium chloride flush 0.9 % injection 5-40 mL, 2 times per day  sodium chloride flush 0.9 % injection 5-40 mL, PRN  0.9 % sodium chloride infusion, PRN  enoxaparin (LOVENOX) injection 40 mg, Daily  ondansetron (ZOFRAN-ODT) disintegrating tablet 4 mg, Q8H PRN   Or  ondansetron (ZOFRAN) injection 4 mg, Q6H PRN  polyethylene glycol (GLYCOLAX) packet 17 g, Daily PRN  acetaminophen (TYLENOL) tablet 650 mg, Q6H PRN   Or  acetaminophen (TYLENOL) suppository 650 mg, Q6H PRN  nitroGLYCERIN (NITROSTAT) SL tablet 0.4 mg, Q5 Min PRN  morphine (PF) injection 2 mg, Q4H PRN  nitrofurantoin (macrocrystal-monohydrate) (MACROBID) capsule 100 mg, 2 times per day        Allergies:  Adhesive tape, Baclofen, Compazine [prochlorperazine], Gabapentin, Iodine, Klonopin [clonazepam], Microzide [hydrochlorothiazide], Phenytoin sodium extended, Prochlorperazine edisylate, Sertraline, Serzone [nefazodone hydrochloride], Other, and Sulfa antibiotics     Review of Systems:   A 14 point review of symptoms completed. Pertinent positives identified in the HPI, all other review of symptoms negative as below.       Objective   PHYSICAL EXAM:    Vitals:    09/06/22 0800   BP: 116/64   Pulse: 85   Resp:    Temp:    SpO2: 99%    Weight: 137 lb 8 oz (62.4 kg) (with shoes on)    Vitals:    09/05/22 2307 09/06/22 0400 09/06/22 0745 09/06/22 0800   BP: 120/66 119/66 120/62 116/64   Pulse: 82 76 84 85   Resp: 16 16 16    Temp: 97.7 °F (36.5 °C) 97.8 °F (36.6 °C) 97.7 °F (36.5 °C)    TempSrc: Oral Oral Oral    SpO2: 97% 98% 99% 99%   Weight:       Height:              General Appearance:  Alert, cooperative, no distress, appears stated age   Head:  Normocephalic, without obvious abnormality, atraumatic   Eyes:  PERRL, conjunctiva/corneas clear   Nose: Nares normal, no drainage or sinus tenderness   Throat: Lips, mucosa, and tongue normal   Neck: Supple, symmetrical, trachea midline, no adenopathy, thyroid: not enlarged, symmetric, no tenderness/mass/nodules, no carotid bruit or JVD   Lungs:   Clear to auscultation bilaterally, respirations unlabored   Chest Wall:  No deformity or tenderness   Heart:  Regular rate and rhythm, S1, S2 normal, no murmur, rub or gallop   Abdomen:   Soft, non-tender, bowel sounds active all four quadrants,  no masses, no organomegaly   Extremities: Extremities normal, atraumatic, no cyanosis or edema   Pulses: 2+ and symmetric   Skin: Skin color, texture, turgor normal, no rashes or lesions   Pysch: Normal mood and affect   Neurologic: Normal gross motor and sensory exam.         Labs   CBC:   Lab Results   Component Value Date/Time    WBC 5.3 09/05/2022 04:10 PM    RBC 4.40 09/05/2022 04:10 PM    HGB 13.3 09/05/2022 04:10 PM    HCT 39.1 09/05/2022 04:10 PM MCV 89.0 2022 04:10 PM    RDW 14.3 2022 04:10 PM     2022 04:10 PM     CMP:  Lab Results   Component Value Date/Time     2022 04:10 PM    K 3.5 2022 04:10 PM     2022 04:10 PM    CO2 24 2022 04:10 PM    BUN 14 2022 04:10 PM    CREATININE 0.6 2022 04:10 PM    GFRAA >60 2022 04:10 PM    GFRAA >60 2013 10:35 AM    AGRATIO 1.8 2022 04:10 PM    LABGLOM >60 2022 04:10 PM    GLUCOSE 101 2022 04:10 PM    PROT 7.3 2022 04:10 PM    PROT 7.7 10/24/2012 09:33 AM    CALCIUM 9.4 2022 04:10 PM    BILITOT 0.6 2022 04:10 PM    ALKPHOS 75 2022 04:10 PM    AST 14 2022 04:10 PM    ALT 13 2022 04:10 PM     PT/INR:  No results found for: PTINR  HgBA1c:  Lab Results   Component Value Date    LABA1C 5.5 2014     Lab Results   Component Value Date    TROPONINI <0.01 2022         Cardiac Data     Last EK2022 1423 inus tach, low voltage, NSST changes without gross ischemia  2022 0819 no gross change    Echo:    Stress Test:    Cath: [place] 2018    FINDINGS:   RHC:   RA: 1mmHg   RV: 13/3   PA: 15/9 (14)   PCWP 5   CO 6.03, CI 3.63     LHC:   LM: Normal   LAD: Normal   CX: Normal   RCA: Normal     LV: EDP 5, EF > 60%     Conclusion:   Normal cors   Normal LV   Normal EDP   Normal Pulm pressures     Studies:     CTPA: negative  CXR: No acute cardiopulmonary findings. Assessment and Plan      1. Chest pain: atypical  2. Hx of atypical CP  3. Fibromyalgia      PLAN  1. Pt r/o for AMi x 4, pt with normal cath in 2018  2. Sed/crp  3. D/w  pt stress test- she dosen't feel it is her heart and wants to hold off and d/w with her cardiologist   - given atypical CP, and neg trops, this is reasonable. - states celebrex in past helps. 4. Limited echo        Pt follows with Dr. Mason Dumas, if above is negative, ok to d/c from cards standpoint.        Patient Active Problem List Diagnosis    GERD (gastroesophageal reflux disease)    Fibromyalgia    Anxiety    Insomnia    Osteopenia    Impaired fasting glucose    Vitamin D deficiency    Hypercholesterolemia    Family history of thyroid disease    Pneumonia    Lumbar facet arthropathy    Calculus of gallbladder with acute on chronic cholecystitis without obstruction    Family history of liver cancer    Family history of Graves' disease    SOB (shortness of breath)           Thank you for allowing us to participate in the care of Andrew Charlton. Please call me with any questions [phone]. Sheryl Sutton MD, [facility] Cardiologist  [facility]  [phone] [address] [facility]  [phone] [address] [place] [address]  9/6/2022 8:39 AM    I will address the patient's cardiac risk factors and adjusted pharmacologic treatment as needed. In addition, I have reinforced the need for patient directed risk factor modification. All questions and concerns were addressed to the patient/family. Alternatives to my treatment were discussed. The note was completed using EMR. Every effort was made to ensure accuracy; however, inadvertent computerized transcription errors may be present.

## 2022-09-06 NOTE — PROGRESS NOTES
Patient discharged home with spouse. IV removed with no complications and telemetry removed. [address] notified. Discharge education explained to patient and spouse with verbal understanding, questions answered. Patient walked off unit to parking lot with spouse.

## 2022-09-06 NOTE — PLAN OF CARE
Problem: Discharge Planning  Goal: Discharge to home or other facility with appropriate resources  Outcome: Progressing  Flowsheets (Taken 9/6/2022 8748)  Discharge to home or other facility with appropriate resources: Identify barriers to discharge with patient and caregiver     Problem: Pain  Goal: Verbalizes/displays adequate comfort level or baseline comfort level  Outcome: Progressing  Flowsheets (Taken 9/6/2022 9663)  Verbalizes/displays adequate comfort level or baseline comfort level: Encourage patient to monitor pain and request assistance

## 2022-09-06 NOTE — PROGRESS NOTES
Hospitalist Progress Note      PCP: Becka Navarro DO    Date of Admission: 9/5/2022    Chief Complaint: shortness of breath    Hospital Course: 78 y.o. female who [facility] above complaints  Patient presented to the ED today with complaints of shortness of breath and chest discomfort. Patient reports symptoms going on for the past week. She reports shortness of breath present at rest, worse with exertion, mild nonproductive cough. Reports retrosternal chest discomfort that sometimes radiates to the interscapular area of the back, mild intensity pressure-like sensation, no relation to food . She reports every time she takes rest the pain subsides but the shortness of breath remains. Denied any nausea vomiting or diaphoresis. No lightheadedness, palpitations. Patient reports she is currently being treated for UTI, however denies any abdominal pain, fevers or chills. Denies any leg swelling orthopnea or PND. Denies any history of heart problems in the past.  Patient reports that she was following up with Dr. Larissa Brambila cardiologist at [facility]. She reports she had an angiogram back in 2018 that was normal.  Last saw him in July 2021. And she was asked to follow only as needed if recurrent symptoms. Patient reports years ago she went to [facility] and was diagnosed with chest wall pain syndrome. Subjective: denies n/v/d, dysuria, fever, chills, headache. Denies shortness of breath, but states it feels hard to breathe.  Chest pain rated 5/10 at time of exam.       Medications:  Reviewed    Infusion Medications    sodium chloride       Scheduled Medications    atorvastatin  40 mg Oral Nightly    calcium elemental  500 mg Oral Daily    Vitamin D  2,000 Units Oral Daily    pantoprazole  40 mg Oral Daily    vitamin B-12  500 mcg Oral Daily    sodium chloride flush  5-40 mL IntraVENous 2 times per day    enoxaparin  40 mg SubCUTAneous Daily    nitrofurantoin (macrocrystal-monohydrate)  100 mg Oral 2 times per day     PRN Meds: perflutren lipid microspheres, perflutren lipid microspheres, perflutren lipid microspheres, sodium chloride flush, sodium chloride, ondansetron **OR** ondansetron, polyethylene glycol, acetaminophen **OR** acetaminophen, nitroGLYCERIN, morphine      Intake/Output Summary (Last 24 hours) at 9/6/2022 1420  Last data filed at 9/6/2022 0800  Gross per 24 hour   Intake 240 ml   Output 350 ml   Net -110 ml       Physical Exam Performed:    /62   Pulse 77   Temp 97.5 °F (36.4 °C) (Oral)   Resp 16   Ht 5' 7\" (1.702 m)   Wt 137 lb 8 oz (62.4 kg) Comment: with shoes on  SpO2 99%   BMI 21.54 kg/m²     General appearance: resting in bed, comfortable. No apparent distress, appears stated age and cooperative. Echo at bedside  HEENT: Pupils equal, round, and reactive to light. Conjunctivae/corneas clear. Neck: Supple, with full range of motion. No jugular venous distention. Trachea midline. Respiratory:  Normal respiratory effort. Clear to auscultation, bilaterally without Rales/Wheezes/Rhonchi. Room air. Cardiovascular: Regular rate and rhythm with normal S1/S2 without murmurs, rubs or gallops. Abdomen: Soft, non-tender, non-distended with normal bowel sounds. Musculoskeletal: No clubbing, cyanosis or edema bilaterally. Full range of motion without deformity. Pain reported when any part of her body is touched. Skin: Skin color, texture, turgor normal.  No rashes or lesions. Neurologic:  Neurovascularly intact without any focal sensory/motor deficits.  Cranial nerves: II-XII intact, grossly non-focal.  Psychiatric: Alert and oriented, thought content appropriate, normal insight  Capillary Refill: Brisk, 3 seconds, normal   Peripheral Pulses: +2 palpable, equal bilaterally       Labs:   Recent Labs     09/05/22  1610   WBC 5.3   HGB 13.3   HCT 39.1        Recent Labs     09/05/22  1610      K 3.5      CO2 24   BUN 14 CREATININE 0.6   CALCIUM 9.4     Recent Labs     09/05/22  1610   AST 14*   ALT 13   BILITOT 0.6   ALKPHOS 75     No results for input(s): INR in the last 72 hours. Recent Labs     09/05/22  1925 09/06/22  0010 09/06/22  0310   TROPONINI <0.01 <0.01 <0.01       Urinalysis:      Lab Results   Component Value Date/Time    BLOODU trace 12/20/2010 04:06 PM    SPECGRAV 1.005 12/20/2010 04:06 PM    GLUCOSEU neg 12/20/2010 04:06 PM       Radiology:  CT CHEST PULMONARY EMBOLISM W CONTRAST   Final Result   No evidence of pulmonary embolism or acute pulmonary abnormality. XR CHEST (2 VW)   Final Result   No acute cardiopulmonary findings.              Assessment/Plan:    Active Hospital Problems    Diagnosis     Chest pain [R07.9]      Priority: Medium    SOB (shortness of breath) [R06.02]      Priority: Medium    Hypercholesterolemia [E78.00]     Osteopenia [M85.80]     GERD (gastroesophageal reflux disease) [K21.9]      Shortness of breath  -CTA chest with no evidence of PE or acute pulm abnormality  -Possible anginal equivalent, or associated symptom    Atypical chest pain  -Possible etiologies include ACS, musculoskeletal, GI source  -Pt reports having recently stopped her home dose of celebrex   -Trop negative x4  -EKG, non-acute  -CRP elevated, ESR normal  -Trial toradol, pt with multiple allergies  -Pt with 5/10 chest pain at rest   -Cardiology consulted, appreciate recommendations    Chronic diastolic heart failure  -New diagnosis  -No evidence of exacerbation present  -BNP 1135 on arrival  -No previous diagnosis of HF, no previous echo found  -Echo this admission: EF 55%; G1 DD, normal filling pressure     Hyperlipidemia, uncontrolled  -Does not appear to be on any home meds  -Lipid panel this admission: Chol 201,   -Liver enzymes normal  -Initiate high-intensity statin therapy with lipitor 40mg     Osteopenia/arthritis  -Prolia injections every 6 months  -Continue calcium carbonate, vitamin D supplementation  -Followed by rheumatology as outpatient     GERD  -Continue PPI     Fibromyalgia, stable  -Likely contributing to pain pt is experiencing  -Supportive care      DVT Prophylaxis: lovenox  Diet: ADULT DIET;  Regular  Code Status: Full Code  PT/OT Eval Status: not indicated    Dispo - 1-2 days, pending course/results    Appropriate for A1 Discharge Unit: No      STONE Beverly - CNP

## 2022-09-06 NOTE — DISCHARGE SUMMARY
Hospital Medicine Discharge Summary    Patient ID: Betty Carvajal      Patient's PCP: Nicho Huang DO    Admit Date: 9/5/2022     Discharge Date: 9/6/22    Admitting Provider: Marilyn Chowdhury MD     Discharge Provider: STONE Rivera CNP     Discharge Diagnoses: Active Hospital Problems    Diagnosis     Chest pain [R07.9]      Priority: Medium    SOB (shortness of breath) [R06.02]      Priority: Medium    Hypercholesterolemia [E78.00]     Osteopenia [M85.80]     GERD (gastroesophageal reflux disease) [K21.9]        The patient was seen and examined on day of discharge and this discharge summary is in conjunction with any daily progress note from day of discharge. Hospital Course: 78 y.o. female who presented to [facility] with above complaints  Patient presented to the ED today with complaints of shortness of breath and chest discomfort. Patient reports symptoms going on for the past week. She reports shortness of breath present at rest, worse with exertion, mild nonproductive cough. Reports retrosternal chest discomfort that sometimes radiates to the interscapular area of the back, mild intensity pressure-like sensation, no relation to food . She reports every time she takes rest the pain subsides but the shortness of breath remains. Denied any nausea vomiting or diaphoresis. No lightheadedness, palpitations. Patient reports she is currently being treated for UTI, however denies any abdominal pain, fevers or chills. Denies any leg swelling orthopnea or PND. Denies any history of heart problems in the past.  Patient reports that she was following up with Dr. Papa Cano cardiologist at [facility]. She reports she had an angiogram back in 2018 that was normal.  Last saw him in July 2021. And she was asked to follow only as needed if recurrent symptoms. Patient reports years ago she went to [facility] and was diagnosed with chest wall pain syndrome.       Shortness of breath, resolved. CTA chest with no evidence of PE or acute pulm abnormality. Chest pain likely contributing. Atypical chest pain. Possible etiologies include ACS, musculoskeletal, GI source. Pt reports having recently stopped her home dose of celebrex . Trop negative x4. EKG, non-acute. CRP elevated, ESR normal. Toradol provided relief, per pt. Cardiology consulted, stress test deferred at this time. Follow up as outpatient. Discussed with patient use of ice/heat to help with pain. And to follow up with PCP to discuss different NSAID options. Elevated BNP. BNP 1135 on arrival. No previous diagnosis of HF, no previous echo found. Echo this admission: EF 55%; G1 DD, normal filling pressure     Hyperlipidemia, uncontrolled. Does not appear to be on any home meds. Lipid panel this admission: Chol 201, . Liver enzymes normal. Initiate high-intensity statin therapy with lipitor 40mg     Osteopenia/arthritis. Prolia injections every 6 months. Continue calcium carbonate, vitamin D supplementation. Followed by rheumatology as outpatient     GERD. Continue PPI     Fibromyalgia, stable. Likely contributing to pain pt is experiencing. Supportive care      Physical Exam Performed:     /62   Pulse 77   Temp 97.5 °F (36.4 °C) (Oral)   Resp 16   Ht 5' 7\" (1.702 m)   Wt 137 lb 8 oz (62.4 kg) Comment: with shoes on  SpO2 99%   BMI 21.54 kg/m²       General appearance: resting in bed, comfortable. No apparent distress, appears stated age and cooperative. HEENT: Normal cephalic, atraumatic without obvious deformity. Pupils equal, round, and reactive to light. Extra ocular muscles intact. Conjunctivae/corneas clear. Neck: Supple, with full range of motion. No jugular venous distention. Trachea midline. Respiratory: Normal respiratory effort. Clear to auscultation, bilaterally without Rales/Wheezes/Rhonchi.  Room air  Cardiovascular: Regular rate and rhythm with normal S1/S2 without murmurs, rubs or gallops. Abdomen: Soft, non-tender, non-distended with normal bowel sounds. Musculoskeletal: No clubbing, cyanosis or edema bilaterally. Full range of motion without deformity. Skin: Skin color, texture, turgor normal. No rashes or lesions. Neurologic: Neurovascularly intact without any focal sensory/motor deficits. Cranial nerves: II-XII intact, grossly non-focal.  Psychiatric: Alert and oriented, thought content appropriate, normal insight  Capillary Refill: Brisk,< 3 seconds   Peripheral Pulses: +2 palpable, equal bilaterally       Labs: For convenience and continuity at follow-up the following most recent labs are provided:      CBC:    Lab Results   Component Value Date/Time    WBC 5.3 09/05/2022 04:10 PM    HGB 13.3 09/05/2022 04:10 PM    HCT 39.1 09/05/2022 04:10 PM     09/05/2022 04:10 PM       Renal:    Lab Results   Component Value Date/Time     09/05/2022 04:10 PM    K 3.5 09/05/2022 04:10 PM     09/05/2022 04:10 PM    CO2 24 09/05/2022 04:10 PM    BUN 14 09/05/2022 04:10 PM    CREATININE 0.6 09/05/2022 04:10 PM    CALCIUM 9.4 09/05/2022 04:10 PM         Significant Diagnostic Studies    Radiology:   CT CHEST PULMONARY EMBOLISM W CONTRAST   Final Result   No evidence of pulmonary embolism or acute pulmonary abnormality. XR CHEST (2 VW)   Final Result   No acute cardiopulmonary findings. Consults:     IP CONSULT TO CARDIOLOGY    Disposition:  home     Condition at Discharge: Stable    Discharge Instructions/Follow-up:  PCP within 1 week.  Cardiology as recommended    Code Status:  Full Code     Activity: activity as tolerated    Diet: regular diet      Discharge Medications:     Current Discharge Medication List             Details   atorvastatin (LIPITOR) 40 MG tablet Take 1 tablet by mouth nightly  Qty: 30 tablet, Refills: 0      nitrofurantoin, macrocrystal-monohydrate, (MACROBID) 100 MG capsule Take 1 capsule by mouth every 12 hours for 6 doses  Qty: 6 capsule, Refills: 0      ketorolac (TORADOL) 10 MG tablet Take 1 tablet by mouth every 6 hours as needed for Pain  Qty: 20 tablet, Refills: 0                Details   Denosumab (PROLIA SC) Inject into the skin every 6 months      vitamin B-12 (CYANOCOBALAMIN) 500 MCG tablet Take 500 mcg by mouth in the morning. ondansetron (ZOFRAN) 4 MG tablet Take 4 mg by mouth every 8 hours as needed for Nausea or Vomiting      calcium carbonate 600 MG TABS tablet Take 1 tablet by mouth daily      esomeprazole Magnesium (NEXIUM) 20 MG PACK Take 20 mg by mouth daily. Cholecalciferol (VITAMIN D) 2000 UNITS CAPS capsule Take 2,000 Units by mouth Daily. Time Spent on discharge is more than 45 minutes in the examination, evaluation, counseling and review of medications and discharge plan. Signed:    STONE Escamilla - CNP   9/6/2022      Thank you Veronica Seth DO for the opportunity to be involved in this patient's care. If you have any questions or concerns, please feel free to contact me at [phone].

## 2022-09-06 NOTE — H&P
Hospital Medicine History & Physical      PCP: Everette Franklin DO    Date of Admission: 9/5/2022    Date of Service: Pt seen/examined on 9.5.22 and Placed in Observation. Chief Complaint: Shortness of breath      History Of Present Illness:    78 y.o. female who presented to [facility] with above complaints  Patient presented to the ED today with complaints of shortness of breath and chest discomfort. Patient reports symptoms going on for the past week. She reports shortness of breath present at rest, worse with exertion, mild nonproductive cough. Reports retrosternal chest discomfort that sometimes radiates to the interscapular area of the back, mild intensity pressure-like sensation, no relation to food . She reports every time she takes rest the pain subsides but the shortness of breath remains. Denied any nausea vomiting or diaphoresis. No lightheadedness, palpitations. Patient reports she is currently being treated for UTI, however denies any abdominal pain, fevers or chills. Denies any leg swelling orthopnea or PND. Denies any history of heart problems in the past.    Patient reports that she was following up with Dr. Zelda Nicole cardiologist at [facility]. She reports she had an angiogram back in 2018 that was normal.  Last saw him in July 2021. And she was asked to follow only as needed if recurrent symptoms. Patient reports years ago she went to [facility] and was diagnosed with chest wall pain syndrome.     Past Medical History:          Diagnosis Date    Anxiety 2/6/2013    Dyslipidemia 05/03/2013    Fibromyalgia     GERD (gastroesophageal reflux disease)     Hypercholesterolemia 6/11/2014    Osteopenia     Pre-diabetes 05/03/2013       Past Surgical History:          Procedure Laterality Date    CHOLECYSTECTOMY, LAPAROSCOPIC N/A 03/17/2017    COLONOSCOPY  1993    poor prep    COLONOSCOPY  05/03/2013    WNL    COLONOSCOPY  02/2017    EYE SURGERY      GALLBLADDER SURGERY HYSTERECTOMY (CERVIX STATUS UNKNOWN)      LASIK  10/23/2012    TONSILLECTOMY      UPPER GASTROINTESTINAL ENDOSCOPY  05/03/2013    WNL    UPPER GASTROINTESTINAL ENDOSCOPY  02/2017    UPPER GASTROINTESTINAL ENDOSCOPY N/A 7/18/2022    EGD BIOPSY performed by Sherri Baca MD at [address]  7/18/2022    EGD ESOPHAGOGASTRODUODENOSCOPY DILATATION performed by Sherri Baca MD at [address]       Medications Prior to Admission:      Prior to Admission medications    Medication Sig Start Date End Date Taking? Authorizing Provider   Denosumab (PROLIA SC) Inject into the skin every 6 months    Historical Provider, MD   vitamin B-12 (CYANOCOBALAMIN) 500 MCG tablet Take 500 mcg by mouth in the morning. Historical Provider, MD   celecoxib (CELEBREX) 200 MG capsule Take 200 mg by mouth 2 times daily as needed for Pain    Historical Provider, MD   ondansetron (ZOFRAN) 4 MG tablet Take 4 mg by mouth every 8 hours as needed for Nausea or Vomiting    Historical Provider, MD   calcium carbonate 600 MG TABS tablet Take 1 tablet by mouth daily    Historical Provider, MD   esomeprazole Magnesium (NEXIUM) 20 MG PACK Take 20 mg by mouth daily. Historical Provider, MD   Cholecalciferol (VITAMIN D) 2000 UNITS CAPS capsule Take 2,000 Units by mouth Daily. Historical Provider, MD       Allergies:  Adhesive tape, Baclofen, Compazine [prochlorperazine], Gabapentin, Iodine, Klonopin [clonazepam], Microzide [hydrochlorothiazide], Phenytoin sodium extended, Prochlorperazine edisylate, Sertraline, Serzone [nefazodone hydrochloride], Other, and Sulfa antibiotics    Social History:      The patient currently lives at home    TOBACCO:   reports that she has never smoked. She has never used smokeless tobacco.  ETOH:   reports no history of alcohol use.   E-cigarette/Vaping       Questions Responses    E-cigarette/Vaping Use Never User    Start Date     Passive Exposure     Quit Date Counseling Given     Comments               Family History:    Reviewed and negative in regards to presenting illness/complaint. Problem Relation Age of Onset    Heart Disease Mother     Heart Disease Father     Heart Disease Brother     Other Neg Hx        REVIEW OF SYSTEMS COMPLETED:   Pertinent positives as noted in the HPI. All other systems reviewed and negative. PHYSICAL EXAM PERFORMED:    /74   Pulse 80   Temp 97.9 °F (36.6 °C) (Oral)   Resp 16   Ht 5' 7\" (1.702 m)   Wt 137 lb 8 oz (62.4 kg) Comment: with shoes on  SpO2 100%   BMI 21.54 kg/m²     General appearance:  No apparent distress, appears stated age and cooperative. HEENT:  Normal cephalic, atraumatic without obvious deformity. Pupils equal, round, and reactive to light. Extra ocular muscles intact. Conjunctivae/corneas clear. Neck: Supple, with full range of motion. No jugular venous distention. Trachea midline. Respiratory:  Normal respiratory effort. Clear to auscultation, bilaterally without Rales/Wheezes/Rhonchi. Cardiovascular:  Regular rate and rhythm with normal S1/S2 without murmurs, rubs or gallops. Abdomen: Soft, non-tender, non-distended with normal bowel sounds. Musculoskeletal:  No clubbing, cyanosis or edema bilaterally. Full range of motion without deformity. Skin: Skin color, texture, turgor normal.  No rashes or lesions. Neurologic:  Neurovascularly intact without any focal sensory/motor deficits.  Cranial nerves: II-XII intact, grossly non-focal.  Psychiatric:  Alert and oriented, thought content appropriate, normal insight  Capillary Refill: Brisk,3 seconds, normal  Peripheral Pulses: +2 palpable, equal bilaterally       Labs:     Recent Labs     09/05/22  1610   WBC 5.3   HGB 13.3   HCT 39.1        Recent Labs     09/05/22  1610      K 3.5      CO2 24   BUN 14   CREATININE 0.6   CALCIUM 9.4     Recent Labs     09/05/22  1610   AST 14*   ALT 13   BILITOT 0.6   ALKPHOS 75     No a.m.    Osteopenia/arthritis-on Prolia injections every 6 months, continue calcium carbonate, vitamin D supplementation. Followed by rheumatology    GERD-stable and asymptomatic, resume Nexium    Fibromyalgia/osteoarthritis -stable, resume home medication regimen    DVT Prophylaxis: Lovenox  Diet: ADULT DIET; Regular  Diet NPO  Code Status: Full Code    PT/OT Eval Status: Ambulatory    Dispo -observation       Rose Tilley MD    Thank you Lou Freeman DO for the opportunity to be involved in this patient's care. If you have any questions or concerns please feel free to contact me at [phone].

## 2022-09-23 ENCOUNTER — HOSPITAL ENCOUNTER (OUTPATIENT)
Dept: PULMONOLOGY | Age: 77
Discharge: HOME | End: 2022-09-23
Payer: MEDICARE

## 2022-09-23 VITALS — OXYGEN SATURATION: 99 %

## 2022-09-23 DIAGNOSIS — R05.9 COUGH: ICD-10-CM

## 2022-09-23 DIAGNOSIS — R07.9 CHEST PAIN, UNSPECIFIED TYPE: ICD-10-CM

## 2022-09-23 DIAGNOSIS — R06.02 SOB (SHORTNESS OF BREATH): ICD-10-CM

## 2022-09-23 LAB
DLCO %PRED: 91 %
DLCO PRED: NORMAL
DLCO/VA %PRED: NORMAL
DLCO/VA PRED: NORMAL
DLCO/VA: NORMAL
DLCO: NORMAL
EXPIRATORY TIME-POST: NORMAL
EXPIRATORY TIME: NORMAL
FEF 25-75% %CHNG: NORMAL
FEF 25-75% %PRED-POST: NORMAL
FEF 25-75% %PRED-PRE: NORMAL
FEF 25-75% PRED: NORMAL
FEF 25-75%-POST: NORMAL
FEF 25-75%-PRE: NORMAL
FEV1 %PRED-POST: 104 %
FEV1 %PRED-PRE: 99 %
FEV1 PRED: NORMAL
FEV1-POST: NORMAL
FEV1-PRE: NORMAL
FEV1/FVC %PRED-POST: NORMAL
FEV1/FVC %PRED-PRE: NORMAL
FEV1/FVC PRED: NORMAL
FEV1/FVC-POST: 80 %
FEV1/FVC-PRE: 75 %
FVC %PRED-POST: NORMAL
FVC %PRED-PRE: NORMAL
FVC PRED: NORMAL
FVC-POST: 99 L
FVC-PRE: 99 L
GAW %PRED: NORMAL
GAW PRED: NORMAL
GAW: NORMAL
IC %PRED: NORMAL
IC PRED: NORMAL
IC: NORMAL
MEP: NORMAL
MIP: NORMAL
MVV %PRED-PRE: NORMAL
MVV PRED: NORMAL
MVV-PRE: NORMAL
PEF %PRED-POST: NORMAL
PEF %PRED-PRE: NORMAL
PEF PRED: NORMAL
PEF%CHNG: NORMAL
PEF-POST: NORMAL
PEF-PRE: NORMAL
RAW %PRED: NORMAL
RAW PRED: NORMAL
RAW: NORMAL
RV %PRED: NORMAL
RV PRED: NORMAL
RV: NORMAL
SVC %PRED: NORMAL
SVC PRED: NORMAL
SVC: NORMAL
TLC %PRED: 83 %
TLC PRED: NORMAL
TLC: NORMAL
VA %PRED: NORMAL
VA PRED: NORMAL
VA: NORMAL
VTG %PRED: NORMAL
VTG PRED: NORMAL
VTG: NORMAL

## 2022-09-23 PROCEDURE — 94760 N-INVAS EAR/PLS OXIMETRY 1: CPT

## 2022-09-23 PROCEDURE — 94726 PLETHYSMOGRAPHY LUNG VOLUMES: CPT

## 2022-09-23 PROCEDURE — 6370000000 HC RX 637 (ALT 250 FOR IP): Performed by: INTERNAL MEDICINE

## 2022-09-23 PROCEDURE — 94729 DIFFUSING CAPACITY: CPT

## 2022-09-23 PROCEDURE — 94060 EVALUATION OF WHEEZING: CPT

## 2022-09-23 RX ORDER — ALBUTEROL SULFATE 90 UG/1
4 AEROSOL, METERED RESPIRATORY (INHALATION) ONCE
Status: COMPLETED | OUTPATIENT
Start: 2022-09-23 | End: 2022-09-23

## 2022-09-23 RX ADMIN — Medication 4 PUFF: at 07:25

## 2022-09-23 ASSESSMENT — PULMONARY FUNCTION TESTS
FEV1_PERCENT_PREDICTED_POST: 104
FEV1/FVC_PRE: 75
FVC_PRE: 99
FEV1/FVC_POST: 80
FEV1_PERCENT_PREDICTED_PRE: 99
FVC_POST: 99

## 2022-09-27 NOTE — PROCEDURES
[facility] [address], [place] [address]                               PULMONARY FUNCTION    PATIENT NAME: Cherelle Mullen                      :        1945  MED REC NO:   [id number]                          ROOM:  ACCOUNT NO:   [de-identified]                           ADMIT DATE: 2022  PROVIDER:     Raiza Fulton MD    DATE OF PROCEDURE:  2022    PFT    Spirometry showed FVC 3.16 L, 99% predicted, FEV1 2.39 L, 99% predicted,  with FEV1/FVC ratio of 75%. There was no response to bronchodilator. Lung volumes showed air trapping. Diffusion capacity was normal at 91%  predicted. IMPRESSION:  Except for air trapping this is a normal PFT. Isolated air  trapping could be seen in a bronchospastic disorders such as asthma,  early COPD amongst others. Clinical correlation is recommended.         Ewa Nunez MD    D: 2022 10:35:23       T: 2022 15:24:33     [email or web address]  Job#: [id number]     Doc#: [id number]    CC:  Eve Jaramillo

## 2024-12-03 ENCOUNTER — HOSPITAL ENCOUNTER (EMERGENCY)
Age: 79
Discharge: HOME | End: 2024-12-03
Attending: EMERGENCY MEDICINE
Payer: MEDICARE

## 2024-12-03 ENCOUNTER — APPOINTMENT (OUTPATIENT)
Dept: GENERAL RADIOLOGY | Age: 79
End: 2024-12-03
Payer: MEDICARE

## 2024-12-03 VITALS
OXYGEN SATURATION: 100 % | BODY MASS INDEX: 22.27 KG/M2 | WEIGHT: 142.2 LBS | DIASTOLIC BLOOD PRESSURE: 64 MMHG | TEMPERATURE: 98 F | SYSTOLIC BLOOD PRESSURE: 143 MMHG | RESPIRATION RATE: 18 BRPM | HEART RATE: 84 BPM

## 2024-12-03 DIAGNOSIS — R21 RASH AND OTHER NONSPECIFIC SKIN ERUPTION: Primary | ICD-10-CM

## 2024-12-03 LAB
ANION GAP SERPL CALCULATED.3IONS-SCNC: 14 MMOL/L (ref 3–16)
BASOPHILS # BLD: 0 K/UL (ref 0–0.2)
BASOPHILS NFR BLD: 0.2 %
BUN SERPL-MCNC: 16 MG/DL (ref 7–20)
CALCIUM SERPL-MCNC: 10 MG/DL (ref 8.3–10.6)
CHLORIDE SERPL-SCNC: 103 MMOL/L (ref 99–110)
CO2 SERPL-SCNC: 21 MMOL/L (ref 21–32)
CREAT SERPL-MCNC: 0.8 MG/DL (ref 0.6–1.2)
DEPRECATED RDW RBC AUTO: 14.1 % (ref 12.4–15.4)
EOSINOPHIL # BLD: 0.1 K/UL (ref 0–0.6)
EOSINOPHIL NFR BLD: 0.5 %
GFR SERPLBLD CREATININE-BSD FMLA CKD-EPI: 76 ML/MIN/{1.73_M2}
GLUCOSE SERPL-MCNC: 91 MG/DL (ref 70–99)
HCT VFR BLD AUTO: 43.1 % (ref 36–48)
HGB BLD-MCNC: 13.9 G/DL (ref 12–16)
LYMPHOCYTES # BLD: 3.7 K/UL (ref 1–5.1)
LYMPHOCYTES NFR BLD: 34 %
MCH RBC QN AUTO: 29.1 PG (ref 26–34)
MCHC RBC AUTO-ENTMCNC: 32.2 G/DL (ref 31–36)
MCV RBC AUTO: 90.5 FL (ref 80–100)
MONOCYTES # BLD: 0.8 K/UL (ref 0–1.3)
MONOCYTES NFR BLD: 7.4 %
NEUTROPHILS # BLD: 6.4 K/UL (ref 1.7–7.7)
NEUTROPHILS NFR BLD: 57.9 %
PLATELET # BLD AUTO: 252 K/UL (ref 135–450)
PMV BLD AUTO: 7.1 FL (ref 5–10.5)
POTASSIUM SERPL-SCNC: NORMAL MMOL/L (ref 3.5–5.1)
RBC # BLD AUTO: 4.77 M/UL (ref 4–5.2)
SODIUM SERPL-SCNC: 138 MMOL/L (ref 136–145)
TROPONIN, HIGH SENSITIVITY: 10 NG/L (ref 0–14)
TROPONIN, HIGH SENSITIVITY: 11 NG/L (ref 0–14)
WBC # BLD AUTO: 11 K/UL (ref 4–11)

## 2024-12-03 PROCEDURE — 99285 EMERGENCY DEPT VISIT HI MDM: CPT

## 2024-12-03 PROCEDURE — 85025 COMPLETE CBC W/AUTO DIFF WBC: CPT

## 2024-12-03 PROCEDURE — 36415 COLL VENOUS BLD VENIPUNCTURE: CPT

## 2024-12-03 PROCEDURE — 93005 ELECTROCARDIOGRAM TRACING: CPT | Performed by: EMERGENCY MEDICINE

## 2024-12-03 PROCEDURE — 6370000000 HC RX 637 (ALT 250 FOR IP): Performed by: EMERGENCY MEDICINE

## 2024-12-03 PROCEDURE — 80048 BASIC METABOLIC PNL TOTAL CA: CPT

## 2024-12-03 PROCEDURE — 71046 X-RAY EXAM CHEST 2 VIEWS: CPT

## 2024-12-03 PROCEDURE — 84484 ASSAY OF TROPONIN QUANT: CPT

## 2024-12-03 RX ORDER — PREDNISONE 20 MG/1
20 TABLET ORAL ONCE
Status: COMPLETED | OUTPATIENT
Start: 2024-12-03 | End: 2024-12-03

## 2024-12-03 RX ORDER — PREDNISONE 20 MG/1
20 TABLET ORAL DAILY
Qty: 5 TABLET | Refills: 0 | Status: SHIPPED | OUTPATIENT
Start: 2024-12-03

## 2024-12-03 RX ORDER — CETIRIZINE HYDROCHLORIDE 10 MG/1
5 TABLET ORAL DAILY
Qty: 7 TABLET | Refills: 0 | Status: SHIPPED | OUTPATIENT
Start: 2024-12-03

## 2024-12-03 RX ADMIN — PREDNISONE 20 MG: 20 TABLET ORAL at 13:19

## 2024-12-03 ASSESSMENT — ENCOUNTER SYMPTOMS
RHINORRHEA: 0
EYE REDNESS: 0
NAUSEA: 1
SORE THROAT: 0
SHORTNESS OF BREATH: 0
ABDOMINAL PAIN: 0

## 2024-12-03 ASSESSMENT — PAIN - FUNCTIONAL ASSESSMENT: PAIN_FUNCTIONAL_ASSESSMENT: 0-10

## 2024-12-03 ASSESSMENT — PAIN DESCRIPTION - LOCATION: LOCATION: CHEST

## 2024-12-03 ASSESSMENT — PAIN SCALES - GENERAL: PAINLEVEL_OUTOF10: 2

## 2024-12-03 NOTE — ED PROVIDER NOTES
findings:   Interpretation per the Radiologist below, if available at the time of this note:     XR CHEST (2 VW)   Final Result   No acute cardiopulmonary abnormality identified.            No results found.        EKG: The Ekg interpreted by me shows  normal sinus rhythm with a rate of 80  Axis is   55  QTc is  normal  Low voltage      ST Segments: normal  No significant change from prior EKG dated Sep 6, 2022     PROCEDURES   Unless otherwise noted below, none     CRITICAL CARE TIME            Vitals:    Vitals:    12/03/24 1251 12/03/24 1543   BP: (!) 137/58 (!) 143/64   Pulse: 78 84   Resp: 17 18   Temp: 98 °F (36.7 °C)    TempSrc: Oral    SpO2: 99% 100%   Weight: 64.5 kg (142 lb 3.2 oz)         ED Course as of 12/03/24 1553   Tue Dec 03, 2024   1418 CXR: No acute cardiopulmonary abnormality identified. [RB]   1418 Troponin, High Sensitivity: 11 [RB]   1418 WBC: 11.0 [RB]   1418 RBC: 4.77 [RB]   1418 Hemoglobin Quant: 13.9 [RB]   1418 Hematocrit: 43.1 [RB]   1418 Platelet Count: 252 [RB]   1418 Sodium: 138 [RB]   1418 Glucose: 91 [RB]   1418 BUN,BUNPL: 16 [RB]   1418 Creatinine: 0.8 [RB]   1418 Est, Glom Filt Rate: 76 [RB]      ED Course User Index  [RB] Tha Zhang MD       Is this patient to be included in the SEP-1 Core Measure due to severe sepsis or septic shock?   No   Exclusion criteria - the patient is NOT to be included for SEP-1 Core Measure due to:  Infection is not suspected       CC/HPI Summary, DDx, ED Course, and Reassessment: At this time I think allergic reaction is less likely just given how the symptoms only involve the patient's fortune of her face and a little bit of her neck.  There is no shortness of breath.  No throat tightness.  No vomiting.  No abdominal pain.  She is given some prednisone while I waited on 2 high-sensitivity troponins to make sure that her chest discomfort was not coronary artery disease.  I have an exceptionally low suspicion of that in her 2

## 2024-12-04 LAB
EKG ATRIAL RATE: 80 BPM
EKG DIAGNOSIS: NORMAL
EKG P AXIS: 55 DEGREES
EKG P-R INTERVAL: 140 MS
EKG Q-T INTERVAL: 364 MS
EKG QRS DURATION: 74 MS
EKG QTC CALCULATION (BAZETT): 419 MS
EKG R AXIS: 42 DEGREES
EKG T AXIS: 40 DEGREES
EKG VENTRICULAR RATE: 80 BPM

## 2024-12-04 PROCEDURE — 93010 ELECTROCARDIOGRAM REPORT: CPT | Performed by: INTERNAL MEDICINE

## 2025-06-21 ENCOUNTER — APPOINTMENT (OUTPATIENT)
Dept: GENERAL RADIOLOGY | Age: 80
DRG: 481 | End: 2025-06-21
Payer: MEDICARE

## 2025-06-21 ENCOUNTER — HOSPITAL ENCOUNTER (INPATIENT)
Age: 80
LOS: 4 days | Discharge: NURSING, REHAB OR LONG-TERM CARE | DRG: 481 | End: 2025-06-25
Attending: EMERGENCY MEDICINE | Admitting: HOSPITALIST
Payer: MEDICARE

## 2025-06-21 ENCOUNTER — APPOINTMENT (OUTPATIENT)
Dept: CT IMAGING | Age: 80
DRG: 481 | End: 2025-06-21
Payer: MEDICARE

## 2025-06-21 DIAGNOSIS — S72.002A CLOSED FRACTURE OF LEFT HIP, INITIAL ENCOUNTER (HCC): Primary | ICD-10-CM

## 2025-06-21 DIAGNOSIS — J18.9 PNEUMONIA OF LEFT LUNG DUE TO INFECTIOUS ORGANISM, UNSPECIFIED PART OF LUNG: ICD-10-CM
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
## 2025-06-21 LAB
ALBUMIN SERPL-MCNC: 3.5 G/DL (ref 3.4–5)
ALBUMIN/GLOB SERPL: 1.9 {RATIO} (ref 1.1–2.2)
ALP SERPL-CCNC: 73 U/L (ref 40–129)
ALT SERPL-CCNC: 12 U/L (ref 10–40)
AMORPH SED URNS QL MICRO: ABNORMAL /HPF
ANION GAP SERPL CALCULATED.3IONS-SCNC: 12 MMOL/L (ref 3–16)
AST SERPL-CCNC: 21 U/L (ref 15–37)
BACTERIA URNS QL MICRO: ABNORMAL /HPF
BASOPHILS # BLD: 0.1 K/UL (ref 0–0.2)
BASOPHILS NFR BLD: 1 %
BILIRUB SERPL-MCNC: 0.3 MG/DL (ref 0–1)
BILIRUB UR QL STRIP.AUTO: NEGATIVE
BUN SERPL-MCNC: 10 MG/DL (ref 7–20)
CALCIUM SERPL-MCNC: 8 MG/DL (ref 8.3–10.6)
CHLORIDE SERPL-SCNC: 112 MMOL/L (ref 99–110)
CLARITY UR: ABNORMAL
CO2 SERPL-SCNC: 18 MMOL/L (ref 21–32)
COLOR UR: ABNORMAL
CREAT SERPL-MCNC: 0.7 MG/DL (ref 0.6–1.2)
DEPRECATED RDW RBC AUTO: 14.4 % (ref 12.4–15.4)
EOSINOPHIL # BLD: 0.1 K/UL (ref 0–0.6)
EOSINOPHIL NFR BLD: 2 %
GFR SERPLBLD CREATININE-BSD FMLA CKD-EPI: 89 ML/MIN/{1.73_M2}
GLUCOSE SERPL-MCNC: 103 MG/DL (ref 70–99)
GLUCOSE UR STRIP.AUTO-MCNC: NEGATIVE MG/DL
HCT VFR BLD AUTO: 37.3 % (ref 36–48)
HGB BLD-MCNC: 12.8 G/DL (ref 12–16)
HGB UR QL STRIP.AUTO: NEGATIVE
KETONES UR STRIP.AUTO-MCNC: NEGATIVE MG/DL
LEUKOCYTE ESTERASE UR QL STRIP.AUTO: NEGATIVE
LYMPHOCYTES # BLD: 1.9 K/UL (ref 1–5.1)
LYMPHOCYTES NFR BLD: 30.1 %
MCH RBC QN AUTO: 30.7 PG (ref 26–34)
MCHC RBC AUTO-ENTMCNC: 34.4 G/DL (ref 31–36)
MCV RBC AUTO: 89.3 FL (ref 80–100)
MONOCYTES # BLD: 0.4 K/UL (ref 0–1.3)
MONOCYTES NFR BLD: 5.9 %
NEUTROPHILS # BLD: 3.9 K/UL (ref 1.7–7.7)
NEUTROPHILS NFR BLD: 61 %
NITRITE UR QL STRIP.AUTO: NEGATIVE
PH UR STRIP.AUTO: 8 [PH] (ref 5–8)
PLATELET # BLD AUTO: 197 K/UL (ref 135–450)
PMV BLD AUTO: 7.1 FL (ref 5–10.5)
POTASSIUM SERPL-SCNC: 3.1 MMOL/L (ref 3.5–5.1)
PROT SERPL-MCNC: 5.3 G/DL (ref 6.4–8.2)
PROT UR STRIP.AUTO-MCNC: NEGATIVE MG/DL
RBC # BLD AUTO: 4.18 M/UL (ref 4–5.2)
RBC #/AREA URNS HPF: ABNORMAL /HPF (ref 0–4)
SODIUM SERPL-SCNC: 142 MMOL/L (ref 136–145)
SP GR UR STRIP.AUTO: 1.01 (ref 1–1.03)
UA DIPSTICK W REFLEX MICRO PNL UR: YES
URN SPEC COLLECT METH UR: ABNORMAL
UROBILINOGEN UR STRIP-ACNC: 0.2 E.U./DL
WBC # BLD AUTO: 6.4 K/UL (ref 4–11)
WBC #/AREA URNS HPF: ABNORMAL /HPF (ref 0–5)

## 2025-06-21 PROCEDURE — 2580000003 HC RX 258: Performed by: HOSPITALIST

## 2025-06-21 PROCEDURE — 2500000003 HC RX 250 WO HCPCS: Performed by: HOSPITALIST

## 2025-06-21 PROCEDURE — 96374 THER/PROPH/DIAG INJ IV PUSH: CPT

## 2025-06-21 PROCEDURE — 85025 COMPLETE CBC W/AUTO DIFF WBC: CPT

## 2025-06-21 PROCEDURE — 73502 X-RAY EXAM HIP UNI 2-3 VIEWS: CPT

## 2025-06-21 PROCEDURE — 6360000002 HC RX W HCPCS: Performed by: NURSE PRACTITIONER

## 2025-06-21 PROCEDURE — 80053 COMPREHEN METABOLIC PANEL: CPT

## 2025-06-21 PROCEDURE — 1200000000 HC SEMI PRIVATE

## 2025-06-21 PROCEDURE — 93005 ELECTROCARDIOGRAM TRACING: CPT | Performed by: NURSE PRACTITIONER

## 2025-06-21 PROCEDURE — 6370000000 HC RX 637 (ALT 250 FOR IP): Performed by: NURSE PRACTITIONER

## 2025-06-21 PROCEDURE — 99285 EMERGENCY DEPT VISIT HI MDM: CPT

## 2025-06-21 PROCEDURE — 71045 X-RAY EXAM CHEST 1 VIEW: CPT

## 2025-06-21 PROCEDURE — 81001 URINALYSIS AUTO W/SCOPE: CPT

## 2025-06-21 PROCEDURE — 6370000000 HC RX 637 (ALT 250 FOR IP): Performed by: HOSPITALIST

## 2025-06-21 RX ORDER — MAGNESIUM SULFATE IN WATER 40 MG/ML
2000 INJECTION, SOLUTION INTRAVENOUS PRN
Status: DISCONTINUED | OUTPATIENT
Start: 2025-06-21 | End: 2025-06-25 | Stop reason: HOSPADM

## 2025-06-21 RX ORDER — SODIUM CHLORIDE 0.9 % (FLUSH) 0.9 %
5-40 SYRINGE (ML) INJECTION PRN
Status: DISCONTINUED | OUTPATIENT
Start: 2025-06-21 | End: 2025-06-25 | Stop reason: HOSPADM

## 2025-06-21 RX ORDER — ALBUTEROL SULFATE 0.83 MG/ML
0.63 SOLUTION RESPIRATORY (INHALATION) ONCE
Status: DISCONTINUED | OUTPATIENT
Start: 2025-06-21 | End: 2025-06-23

## 2025-06-21 RX ORDER — SODIUM CHLORIDE, SODIUM LACTATE, POTASSIUM CHLORIDE, CALCIUM CHLORIDE 600; 310; 30; 20 MG/100ML; MG/100ML; MG/100ML; MG/100ML
INJECTION, SOLUTION INTRAVENOUS CONTINUOUS
Status: ACTIVE | OUTPATIENT
Start: 2025-06-21 | End: 2025-06-22

## 2025-06-21 RX ORDER — FENTANYL CITRATE 50 UG/ML
50 INJECTION, SOLUTION INTRAMUSCULAR; INTRAVENOUS
Refills: 0 | Status: DISCONTINUED | OUTPATIENT
Start: 2025-06-21 | End: 2025-06-22

## 2025-06-21 RX ORDER — POTASSIUM CHLORIDE 1500 MG/1
40 TABLET, EXTENDED RELEASE ORAL PRN
Status: DISCONTINUED | OUTPATIENT
Start: 2025-06-21 | End: 2025-06-25 | Stop reason: HOSPADM

## 2025-06-21 RX ORDER — SODIUM CHLORIDE 0.9 % (FLUSH) 0.9 %
5-40 SYRINGE (ML) INJECTION EVERY 12 HOURS SCHEDULED
Status: DISCONTINUED | OUTPATIENT
Start: 2025-06-21 | End: 2025-06-25 | Stop reason: HOSPADM

## 2025-06-21 RX ORDER — ACETAMINOPHEN 325 MG/1
650 TABLET ORAL EVERY 6 HOURS PRN
Status: DISCONTINUED | OUTPATIENT
Start: 2025-06-21 | End: 2025-06-25 | Stop reason: HOSPADM

## 2025-06-21 RX ORDER — ENOXAPARIN SODIUM 100 MG/ML
40 INJECTION SUBCUTANEOUS DAILY
Status: DISCONTINUED | OUTPATIENT
Start: 2025-06-22 | End: 2025-06-25 | Stop reason: HOSPADM

## 2025-06-21 RX ORDER — PANTOPRAZOLE SODIUM 40 MG/1
40 TABLET, DELAYED RELEASE ORAL DAILY
Status: DISCONTINUED | OUTPATIENT
Start: 2025-06-22 | End: 2025-06-23

## 2025-06-21 RX ORDER — ONDANSETRON 4 MG/1
4 TABLET, ORALLY DISINTEGRATING ORAL EVERY 8 HOURS PRN
Status: DISCONTINUED | OUTPATIENT
Start: 2025-06-21 | End: 2025-06-25 | Stop reason: HOSPADM

## 2025-06-21 RX ORDER — ACETAMINOPHEN 650 MG/1
650 SUPPOSITORY RECTAL EVERY 6 HOURS PRN
Status: DISCONTINUED | OUTPATIENT
Start: 2025-06-21 | End: 2025-06-25 | Stop reason: HOSPADM

## 2025-06-21 RX ORDER — ONDANSETRON 2 MG/ML
4 INJECTION INTRAMUSCULAR; INTRAVENOUS EVERY 6 HOURS PRN
Status: DISCONTINUED | OUTPATIENT
Start: 2025-06-21 | End: 2025-06-25 | Stop reason: HOSPADM

## 2025-06-21 RX ORDER — ATORVASTATIN CALCIUM 40 MG/1
40 TABLET, FILM COATED ORAL NIGHTLY
Status: DISCONTINUED | OUTPATIENT
Start: 2025-06-21 | End: 2025-06-25 | Stop reason: HOSPADM

## 2025-06-21 RX ORDER — POTASSIUM CHLORIDE 1500 MG/1
40 TABLET, EXTENDED RELEASE ORAL ONCE
Status: COMPLETED | OUTPATIENT
Start: 2025-06-21 | End: 2025-06-21

## 2025-06-21 RX ORDER — POLYETHYLENE GLYCOL 3350 17 G/17G
17 POWDER, FOR SOLUTION ORAL DAILY PRN
Status: DISCONTINUED | OUTPATIENT
Start: 2025-06-21 | End: 2025-06-25 | Stop reason: HOSPADM

## 2025-06-21 RX ORDER — SODIUM CHLORIDE 9 MG/ML
INJECTION, SOLUTION INTRAVENOUS PRN
Status: DISCONTINUED | OUTPATIENT
Start: 2025-06-21 | End: 2025-06-25 | Stop reason: HOSPADM

## 2025-06-21 RX ORDER — POTASSIUM CHLORIDE 7.45 MG/ML
10 INJECTION INTRAVENOUS PRN
Status: DISCONTINUED | OUTPATIENT
Start: 2025-06-21 | End: 2025-06-25 | Stop reason: HOSPADM

## 2025-06-21 RX ADMIN — FENTANYL CITRATE 50 MCG: 50 INJECTION INTRAMUSCULAR; INTRAVENOUS at 21:27

## 2025-06-21 RX ADMIN — SODIUM CHLORIDE, POTASSIUM CHLORIDE, SODIUM LACTATE AND CALCIUM CHLORIDE: 600; 310; 30; 20 INJECTION, SOLUTION INTRAVENOUS at 21:32

## 2025-06-21 RX ADMIN — ATORVASTATIN CALCIUM 40 MG: 40 TABLET, FILM COATED ORAL at 23:10

## 2025-06-21 RX ADMIN — POTASSIUM CHLORIDE 40 MEQ: 1500 TABLET, EXTENDED RELEASE ORAL at 20:21

## 2025-06-21 RX ADMIN — FENTANYL CITRATE 50 MCG: 50 INJECTION INTRAMUSCULAR; INTRAVENOUS at 18:52

## 2025-06-21 RX ADMIN — Medication 10 ML: at 21:17

## 2025-06-21 ASSESSMENT — LIFESTYLE VARIABLES
HOW MANY STANDARD DRINKS CONTAINING ALCOHOL DO YOU HAVE ON A TYPICAL DAY: PATIENT DOES NOT DRINK
HOW OFTEN DO YOU HAVE A DRINK CONTAINING ALCOHOL: NEVER

## 2025-06-21 ASSESSMENT — PAIN DESCRIPTION - ORIENTATION: ORIENTATION: LEFT

## 2025-06-21 ASSESSMENT — PAIN DESCRIPTION - DESCRIPTORS: DESCRIPTORS: ACHING

## 2025-06-21 ASSESSMENT — PAIN SCALES - GENERAL
PAINLEVEL_OUTOF10: 10

## 2025-06-21 ASSESSMENT — PAIN - FUNCTIONAL ASSESSMENT: PAIN_FUNCTIONAL_ASSESSMENT: 0-10

## 2025-06-21 ASSESSMENT — PAIN DESCRIPTION - LOCATION: LOCATION: HIP

## 2025-06-21 NOTE — PLAN OF CARE
PLAN of Care Note    Patient:  eLxi Benitez  YOB: 1945     79 y.o. female      Plan:   -Orthopedics aware of consult, formal consult note to follow  -Imaging results reviewed  -NPO p MN  -Hold Anticoagulation if ok with Primary/Medicine  -Medical clearance/optimization requested  -Plan for OR tomorrow

## 2025-06-22 ENCOUNTER — ANESTHESIA EVENT (OUTPATIENT)
Dept: OPERATING ROOM | Age: 80
End: 2025-06-22
Payer: MEDICARE

## 2025-06-22 ENCOUNTER — APPOINTMENT (OUTPATIENT)
Dept: GENERAL RADIOLOGY | Age: 80
DRG: 481 | End: 2025-06-22
Payer: MEDICARE

## 2025-06-22 ENCOUNTER — ANESTHESIA (OUTPATIENT)
Dept: OPERATING ROOM | Age: 80
End: 2025-06-22
Payer: MEDICARE

## 2025-06-22 LAB
ANION GAP SERPL CALCULATED.3IONS-SCNC: 10 MMOL/L (ref 3–16)
ANION GAP SERPL CALCULATED.3IONS-SCNC: 12 MMOL/L (ref 3–16)
BASOPHILS # BLD: 0.1 K/UL (ref 0–0.2)
BASOPHILS NFR BLD: 0.7 %
BUN SERPL-MCNC: 8 MG/DL (ref 7–20)
BUN SERPL-MCNC: 8 MG/DL (ref 7–20)
CALCIUM SERPL-MCNC: 8.7 MG/DL (ref 8.3–10.6)
CALCIUM SERPL-MCNC: 9.1 MG/DL (ref 8.3–10.6)
CHLORIDE SERPL-SCNC: 106 MMOL/L (ref 99–110)
CHLORIDE SERPL-SCNC: 107 MMOL/L (ref 99–110)
CO2 SERPL-SCNC: 22 MMOL/L (ref 21–32)
CO2 SERPL-SCNC: 25 MMOL/L (ref 21–32)
CREAT SERPL-MCNC: 0.7 MG/DL (ref 0.6–1.2)
CREAT SERPL-MCNC: 0.8 MG/DL (ref 0.6–1.2)
DEPRECATED RDW RBC AUTO: 14 % (ref 12.4–15.4)
EKG ATRIAL RATE: 92 BPM
EKG DIAGNOSIS: NORMAL
EKG P AXIS: 71 DEGREES
EKG P-R INTERVAL: 152 MS
EKG Q-T INTERVAL: 370 MS
EKG QRS DURATION: 72 MS
EKG QTC CALCULATION (BAZETT): 457 MS
EKG R AXIS: -16 DEGREES
EKG T AXIS: 38 DEGREES
EKG VENTRICULAR RATE: 92 BPM
EOSINOPHIL # BLD: 0.2 K/UL (ref 0–0.6)
EOSINOPHIL NFR BLD: 3 %
GFR SERPLBLD CREATININE-BSD FMLA CKD-EPI: 75 ML/MIN/{1.73_M2}
GFR SERPLBLD CREATININE-BSD FMLA CKD-EPI: 89 ML/MIN/{1.73_M2}
GLUCOSE SERPL-MCNC: 113 MG/DL (ref 70–99)
GLUCOSE SERPL-MCNC: 123 MG/DL (ref 70–99)
HCT VFR BLD AUTO: 38.3 % (ref 36–48)
HGB BLD-MCNC: 13 G/DL (ref 12–16)
INR PPP: 1.04 (ref 0.86–1.14)
LYMPHOCYTES # BLD: 1.9 K/UL (ref 1–5.1)
LYMPHOCYTES NFR BLD: 26.5 %
MCH RBC QN AUTO: 30.3 PG (ref 26–34)
MCHC RBC AUTO-ENTMCNC: 33.9 G/DL (ref 31–36)
MCV RBC AUTO: 89.3 FL (ref 80–100)
MONOCYTES # BLD: 0.5 K/UL (ref 0–1.3)
MONOCYTES NFR BLD: 7.5 %
NEUTROPHILS # BLD: 4.5 K/UL (ref 1.7–7.7)
NEUTROPHILS NFR BLD: 62.3 %
PLATELET # BLD AUTO: 185 K/UL (ref 135–450)
PMV BLD AUTO: 7.2 FL (ref 5–10.5)
POTASSIUM SERPL-SCNC: 4.2 MMOL/L (ref 3.5–5.1)
POTASSIUM SERPL-SCNC: 4.4 MMOL/L (ref 3.5–5.1)
PROTHROMBIN TIME: 13.9 SEC (ref 12.1–14.9)
RBC # BLD AUTO: 4.29 M/UL (ref 4–5.2)
SODIUM SERPL-SCNC: 140 MMOL/L (ref 136–145)
SODIUM SERPL-SCNC: 142 MMOL/L (ref 136–145)
WBC # BLD AUTO: 7.2 K/UL (ref 4–11)

## 2025-06-22 PROCEDURE — 0SRS0JA REPLACEMENT OF LEFT HIP JOINT, FEMORAL SURFACE WITH SYNTHETIC SUBSTITUTE, UNCEMENTED, OPEN APPROACH: ICD-10-PCS | Performed by: ORTHOPAEDIC SURGERY

## 2025-06-22 PROCEDURE — 6370000000 HC RX 637 (ALT 250 FOR IP): Performed by: NURSE PRACTITIONER

## 2025-06-22 PROCEDURE — 7100000001 HC PACU RECOVERY - ADDTL 15 MIN: Performed by: ORTHOPAEDIC SURGERY

## 2025-06-22 PROCEDURE — 6370000000 HC RX 637 (ALT 250 FOR IP): Performed by: ORTHOPAEDIC SURGERY

## 2025-06-22 PROCEDURE — 6360000002 HC RX W HCPCS: Performed by: ANESTHESIOLOGY

## 2025-06-22 PROCEDURE — 2709999900 HC NON-CHARGEABLE SUPPLY: Performed by: ORTHOPAEDIC SURGERY

## 2025-06-22 PROCEDURE — 99222 1ST HOSP IP/OBS MODERATE 55: CPT | Performed by: ORTHOPAEDIC SURGERY

## 2025-06-22 PROCEDURE — 6360000002 HC RX W HCPCS: Performed by: PHYSICIAN ASSISTANT

## 2025-06-22 PROCEDURE — 6360000002 HC RX W HCPCS: Performed by: ORTHOPAEDIC SURGERY

## 2025-06-22 PROCEDURE — 85025 COMPLETE CBC W/AUTO DIFF WBC: CPT

## 2025-06-22 PROCEDURE — 6360000002 HC RX W HCPCS: Performed by: NURSE PRACTITIONER

## 2025-06-22 PROCEDURE — 2500000003 HC RX 250 WO HCPCS: Performed by: ORTHOPAEDIC SURGERY

## 2025-06-22 PROCEDURE — 2580000003 HC RX 258: Performed by: ANESTHESIOLOGY

## 2025-06-22 PROCEDURE — 27236 TREAT THIGH FRACTURE: CPT | Performed by: ORTHOPAEDIC SURGERY

## 2025-06-22 PROCEDURE — 85610 PROTHROMBIN TIME: CPT

## 2025-06-22 PROCEDURE — 93010 ELECTROCARDIOGRAM REPORT: CPT | Performed by: INTERNAL MEDICINE

## 2025-06-22 PROCEDURE — 72170 X-RAY EXAM OF PELVIS: CPT

## 2025-06-22 PROCEDURE — 73552 X-RAY EXAM OF FEMUR 2/>: CPT

## 2025-06-22 PROCEDURE — 3700000001 HC ADD 15 MINUTES (ANESTHESIA): Performed by: ORTHOPAEDIC SURGERY

## 2025-06-22 PROCEDURE — 94761 N-INVAS EAR/PLS OXIMETRY MLT: CPT

## 2025-06-22 PROCEDURE — 1200000000 HC SEMI PRIVATE

## 2025-06-22 PROCEDURE — 0QS704Z REPOSITION LEFT UPPER FEMUR WITH INTERNAL FIXATION DEVICE, OPEN APPROACH: ICD-10-PCS | Performed by: ORTHOPAEDIC SURGERY

## 2025-06-22 PROCEDURE — 6360000002 HC RX W HCPCS

## 2025-06-22 PROCEDURE — C1776 JOINT DEVICE (IMPLANTABLE): HCPCS | Performed by: ORTHOPAEDIC SURGERY

## 2025-06-22 PROCEDURE — 7100000000 HC PACU RECOVERY - FIRST 15 MIN: Performed by: ORTHOPAEDIC SURGERY

## 2025-06-22 PROCEDURE — 2500000003 HC RX 250 WO HCPCS: Performed by: PHYSICIAN ASSISTANT

## 2025-06-22 PROCEDURE — 2700000000 HC OXYGEN THERAPY PER DAY

## 2025-06-22 PROCEDURE — 3700000000 HC ANESTHESIA ATTENDED CARE: Performed by: ORTHOPAEDIC SURGERY

## 2025-06-22 PROCEDURE — 36415 COLL VENOUS BLD VENIPUNCTURE: CPT

## 2025-06-22 PROCEDURE — 3600000004 HC SURGERY LEVEL 4 BASE: Performed by: ORTHOPAEDIC SURGERY

## 2025-06-22 PROCEDURE — 2500000003 HC RX 250 WO HCPCS: Performed by: ANESTHESIOLOGY

## 2025-06-22 PROCEDURE — 3600000014 HC SURGERY LEVEL 4 ADDTL 15MIN: Performed by: ORTHOPAEDIC SURGERY

## 2025-06-22 PROCEDURE — 80048 BASIC METABOLIC PNL TOTAL CA: CPT

## 2025-06-22 DEVICE — ARTICUL/EZE FEMORAL HEAD DIAMETER 28MM +5 12/14 TAPER
Type: IMPLANTABLE DEVICE | Site: HIP | Status: FUNCTIONAL
Brand: ARTICUL/EZE

## 2025-06-22 DEVICE — ACTIS DUOFIX HIP PROSTHESIS (FEMORAL STEM 12/14 TAPER CEMENTLESS SIZE 3 STD COLLAR)  CE
Type: IMPLANTABLE DEVICE | Site: HIP | Status: FUNCTIONAL
Brand: ACTIS

## 2025-06-22 DEVICE — SELF CENTERING BI-POLAR HEAD 28MM ID 46MM OD
Type: IMPLANTABLE DEVICE | Site: HIP | Status: FUNCTIONAL
Brand: SELF CENTERING

## 2025-06-22 RX ORDER — SODIUM CHLORIDE 9 MG/ML
INJECTION, SOLUTION INTRAVENOUS PRN
Status: DISCONTINUED | OUTPATIENT
Start: 2025-06-22 | End: 2025-06-22 | Stop reason: HOSPADM

## 2025-06-22 RX ORDER — SODIUM CHLORIDE, SODIUM LACTATE, POTASSIUM CHLORIDE, CALCIUM CHLORIDE 600; 310; 30; 20 MG/100ML; MG/100ML; MG/100ML; MG/100ML
INJECTION, SOLUTION INTRAVENOUS
Status: DISCONTINUED | OUTPATIENT
Start: 2025-06-22 | End: 2025-06-22 | Stop reason: SDUPTHER

## 2025-06-22 RX ORDER — MORPHINE SULFATE 2 MG/ML
2 INJECTION, SOLUTION INTRAMUSCULAR; INTRAVENOUS EVERY 4 HOURS PRN
Status: DISCONTINUED | OUTPATIENT
Start: 2025-06-22 | End: 2025-06-25 | Stop reason: HOSPADM

## 2025-06-22 RX ORDER — OXYCODONE AND ACETAMINOPHEN 5; 325 MG/1; MG/1
2 TABLET ORAL EVERY 6 HOURS PRN
Status: DISCONTINUED | OUTPATIENT
Start: 2025-06-22 | End: 2025-06-25 | Stop reason: HOSPADM

## 2025-06-22 RX ORDER — ONDANSETRON 2 MG/ML
INJECTION INTRAMUSCULAR; INTRAVENOUS
Status: DISCONTINUED | OUTPATIENT
Start: 2025-06-22 | End: 2025-06-22 | Stop reason: SDUPTHER

## 2025-06-22 RX ORDER — MAGNESIUM HYDROXIDE/ALUMINUM HYDROXICE/SIMETHICONE 120; 1200; 1200 MG/30ML; MG/30ML; MG/30ML
20 SUSPENSION ORAL ONCE
Status: COMPLETED | OUTPATIENT
Start: 2025-06-22 | End: 2025-06-22

## 2025-06-22 RX ORDER — MEPERIDINE HYDROCHLORIDE 50 MG/ML
12.5 INJECTION INTRAMUSCULAR; INTRAVENOUS; SUBCUTANEOUS EVERY 5 MIN PRN
Status: DISCONTINUED | OUTPATIENT
Start: 2025-06-22 | End: 2025-06-22 | Stop reason: HOSPADM

## 2025-06-22 RX ORDER — LIDOCAINE HYDROCHLORIDE 20 MG/ML
INJECTION, SOLUTION INFILTRATION; PERINEURAL
Status: DISCONTINUED | OUTPATIENT
Start: 2025-06-22 | End: 2025-06-22 | Stop reason: SDUPTHER

## 2025-06-22 RX ORDER — ROCURONIUM BROMIDE 10 MG/ML
INJECTION, SOLUTION INTRAVENOUS
Status: DISCONTINUED | OUTPATIENT
Start: 2025-06-22 | End: 2025-06-22 | Stop reason: SDUPTHER

## 2025-06-22 RX ORDER — LABETALOL HYDROCHLORIDE 5 MG/ML
10 INJECTION, SOLUTION INTRAVENOUS
Status: DISCONTINUED | OUTPATIENT
Start: 2025-06-22 | End: 2025-06-22 | Stop reason: HOSPADM

## 2025-06-22 RX ORDER — OXYCODONE AND ACETAMINOPHEN 5; 325 MG/1; MG/1
1 TABLET ORAL EVERY 4 HOURS PRN
Refills: 0 | Status: DISCONTINUED | OUTPATIENT
Start: 2025-06-22 | End: 2025-06-22

## 2025-06-22 RX ORDER — MAGNESIUM HYDROXIDE 1200 MG/15ML
LIQUID ORAL CONTINUOUS PRN
Status: COMPLETED | OUTPATIENT
Start: 2025-06-22 | End: 2025-06-22

## 2025-06-22 RX ORDER — ONDANSETRON 2 MG/ML
4 INJECTION INTRAMUSCULAR; INTRAVENOUS
Status: DISCONTINUED | OUTPATIENT
Start: 2025-06-22 | End: 2025-06-22 | Stop reason: HOSPADM

## 2025-06-22 RX ORDER — DEXAMETHASONE SODIUM PHOSPHATE 4 MG/ML
INJECTION, SOLUTION INTRA-ARTICULAR; INTRALESIONAL; INTRAMUSCULAR; INTRAVENOUS; SOFT TISSUE
Status: DISCONTINUED | OUTPATIENT
Start: 2025-06-22 | End: 2025-06-22 | Stop reason: SDUPTHER

## 2025-06-22 RX ORDER — OXYCODONE HYDROCHLORIDE 5 MG/1
5 TABLET ORAL PRN
Status: DISCONTINUED | OUTPATIENT
Start: 2025-06-22 | End: 2025-06-22 | Stop reason: HOSPADM

## 2025-06-22 RX ORDER — FENTANYL CITRATE 50 UG/ML
INJECTION, SOLUTION INTRAMUSCULAR; INTRAVENOUS
Status: DISCONTINUED | OUTPATIENT
Start: 2025-06-22 | End: 2025-06-22 | Stop reason: SDUPTHER

## 2025-06-22 RX ORDER — SODIUM CHLORIDE 0.9 % (FLUSH) 0.9 %
5-40 SYRINGE (ML) INJECTION PRN
Status: DISCONTINUED | OUTPATIENT
Start: 2025-06-22 | End: 2025-06-22 | Stop reason: HOSPADM

## 2025-06-22 RX ORDER — SODIUM CHLORIDE 0.9 % (FLUSH) 0.9 %
5-40 SYRINGE (ML) INJECTION EVERY 12 HOURS SCHEDULED
Status: DISCONTINUED | OUTPATIENT
Start: 2025-06-22 | End: 2025-06-22 | Stop reason: HOSPADM

## 2025-06-22 RX ORDER — NALOXONE HYDROCHLORIDE 0.4 MG/ML
INJECTION, SOLUTION INTRAMUSCULAR; INTRAVENOUS; SUBCUTANEOUS PRN
Status: DISCONTINUED | OUTPATIENT
Start: 2025-06-22 | End: 2025-06-22 | Stop reason: HOSPADM

## 2025-06-22 RX ORDER — DIPHENHYDRAMINE HYDROCHLORIDE 50 MG/ML
12.5 INJECTION, SOLUTION INTRAMUSCULAR; INTRAVENOUS
Status: DISCONTINUED | OUTPATIENT
Start: 2025-06-22 | End: 2025-06-22 | Stop reason: HOSPADM

## 2025-06-22 RX ORDER — OXYCODONE AND ACETAMINOPHEN 5; 325 MG/1; MG/1
1 TABLET ORAL EVERY 6 HOURS PRN
Status: DISCONTINUED | OUTPATIENT
Start: 2025-06-22 | End: 2025-06-25 | Stop reason: HOSPADM

## 2025-06-22 RX ORDER — KETOROLAC TROMETHAMINE 30 MG/ML
15 INJECTION, SOLUTION INTRAMUSCULAR; INTRAVENOUS EVERY 6 HOURS PRN
Status: DISCONTINUED | OUTPATIENT
Start: 2025-06-22 | End: 2025-06-24

## 2025-06-22 RX ORDER — OXYCODONE HYDROCHLORIDE 5 MG/1
10 TABLET ORAL PRN
Status: DISCONTINUED | OUTPATIENT
Start: 2025-06-22 | End: 2025-06-22 | Stop reason: HOSPADM

## 2025-06-22 RX ORDER — TRANEXAMIC ACID 100 MG/ML
INJECTION, SOLUTION INTRAVENOUS
Status: DISCONTINUED | OUTPATIENT
Start: 2025-06-22 | End: 2025-06-22 | Stop reason: SDUPTHER

## 2025-06-22 RX ADMIN — WATER 1000 MG: 1 INJECTION INTRAMUSCULAR; INTRAVENOUS; SUBCUTANEOUS at 13:40

## 2025-06-22 RX ADMIN — HYDROMORPHONE HYDROCHLORIDE 0.5 MG: 1 INJECTION, SOLUTION INTRAMUSCULAR; INTRAVENOUS; SUBCUTANEOUS at 18:15

## 2025-06-22 RX ADMIN — Medication 10 ML: at 20:48

## 2025-06-22 RX ADMIN — FENTANYL CITRATE 25 MCG: 50 INJECTION, SOLUTION INTRAMUSCULAR; INTRAVENOUS at 11:18

## 2025-06-22 RX ADMIN — FENTANYL CITRATE 50 MCG: 50 INJECTION INTRAMUSCULAR; INTRAVENOUS at 08:22

## 2025-06-22 RX ADMIN — Medication 1 AMPULE: at 10:06

## 2025-06-22 RX ADMIN — TRANEXAMIC ACID 1000 MG: 100 INJECTION, SOLUTION INTRAVENOUS at 12:03

## 2025-06-22 RX ADMIN — ROCURONIUM BROMIDE 40 MG: 50 INJECTION, SOLUTION INTRAVENOUS at 10:49

## 2025-06-22 RX ADMIN — OXYCODONE AND ACETAMINOPHEN 2 TABLET: 5; 325 TABLET ORAL at 15:15

## 2025-06-22 RX ADMIN — TRANEXAMIC ACID 1000 MG: 100 INJECTION, SOLUTION INTRAVENOUS at 11:03

## 2025-06-22 RX ADMIN — ROCURONIUM BROMIDE 10 MG: 50 INJECTION, SOLUTION INTRAVENOUS at 11:11

## 2025-06-22 RX ADMIN — CEFAZOLIN 2000 MG: 2 INJECTION, POWDER, FOR SOLUTION INTRAVENOUS at 10:57

## 2025-06-22 RX ADMIN — MEPERIDINE HYDROCHLORIDE 12.5 MG: 50 INJECTION INTRAMUSCULAR; INTRAVENOUS; SUBCUTANEOUS at 12:45

## 2025-06-22 RX ADMIN — ATORVASTATIN CALCIUM 40 MG: 40 TABLET, FILM COATED ORAL at 20:47

## 2025-06-22 RX ADMIN — FENTANYL CITRATE 25 MCG: 50 INJECTION, SOLUTION INTRAMUSCULAR; INTRAVENOUS at 11:20

## 2025-06-22 RX ADMIN — FENTANYL CITRATE 50 MCG: 50 INJECTION INTRAMUSCULAR; INTRAVENOUS at 00:22

## 2025-06-22 RX ADMIN — HYDROMORPHONE HYDROCHLORIDE 0.5 MG: 1 INJECTION, SOLUTION INTRAMUSCULAR; INTRAVENOUS; SUBCUTANEOUS at 12:32

## 2025-06-22 RX ADMIN — SODIUM CHLORIDE, SODIUM LACTATE, POTASSIUM CHLORIDE, AND CALCIUM CHLORIDE: .6; .31; .03; .02 INJECTION, SOLUTION INTRAVENOUS at 10:45

## 2025-06-22 RX ADMIN — ONDANSETRON 4 MG: 2 INJECTION INTRAMUSCULAR; INTRAVENOUS at 12:04

## 2025-06-22 RX ADMIN — HYDROMORPHONE HYDROCHLORIDE 0.5 MG: 1 INJECTION, SOLUTION INTRAMUSCULAR; INTRAVENOUS; SUBCUTANEOUS at 12:57

## 2025-06-22 RX ADMIN — ONDANSETRON 4 MG: 2 INJECTION, SOLUTION INTRAMUSCULAR; INTRAVENOUS at 15:37

## 2025-06-22 RX ADMIN — LIDOCAINE HYDROCHLORIDE 3 ML: 20 INJECTION, SOLUTION INFILTRATION; PERINEURAL at 10:49

## 2025-06-22 RX ADMIN — ALUMINUM HYDROXIDE, MAGNESIUM HYDROXIDE, AND SIMETHICONE 20 ML: 200; 200; 20 SUSPENSION ORAL at 20:47

## 2025-06-22 RX ADMIN — SUGAMMADEX 200 MG: 100 INJECTION, SOLUTION INTRAVENOUS at 12:13

## 2025-06-22 RX ADMIN — DEXAMETHASONE SODIUM PHOSPHATE 4 MG: 4 INJECTION, SOLUTION INTRAMUSCULAR; INTRAVENOUS at 11:18

## 2025-06-22 RX ADMIN — FENTANYL CITRATE 50 MCG: 50 INJECTION INTRAMUSCULAR; INTRAVENOUS at 03:32

## 2025-06-22 RX ADMIN — OXYCODONE AND ACETAMINOPHEN 2 TABLET: 5; 325 TABLET ORAL at 23:28

## 2025-06-22 ASSESSMENT — PAIN DESCRIPTION - ORIENTATION
ORIENTATION: LEFT

## 2025-06-22 ASSESSMENT — PAIN DESCRIPTION - DESCRIPTORS
DESCRIPTORS: ACHING
DESCRIPTORS: ACHING
DESCRIPTORS: PRESSURE
DESCRIPTORS: ACHING

## 2025-06-22 ASSESSMENT — PAIN SCALES - GENERAL
PAINLEVEL_OUTOF10: 9
PAINLEVEL_OUTOF10: 10
PAINLEVEL_OUTOF10: 10
PAINLEVEL_OUTOF10: 7
PAINLEVEL_OUTOF10: 7
PAINLEVEL_OUTOF10: 10
PAINLEVEL_OUTOF10: 5

## 2025-06-22 ASSESSMENT — PAIN DESCRIPTION - LOCATION
LOCATION: HIP

## 2025-06-22 ASSESSMENT — PAIN - FUNCTIONAL ASSESSMENT: PAIN_FUNCTIONAL_ASSESSMENT: ACTIVITIES ARE NOT PREVENTED

## 2025-06-22 NOTE — H&P
[facility] Medicine History & Physical    V 5.1    Date of Admission: 6/21/2025    Date of Service:  Pt seen/examined on 6/21/25    [x]Admitted to Inpatient with expected LOS greater than two midnights due to medical therapy.  []Placed in Observation status.    Chief Admission Complaint:  Fall, left hip fracture    Presenting Admission History:      79 y.o. female with PMHx of HLD, osteoporoisis, fibromyalgia, GERD, depression presents to ER after having fall. Pt was sweeping her driveway when she lost her balance and fell landing on her left side. Denies any head trauma. She was unable to bear weight and had significant pain. She was brought to the ED.  In the ED, she was hypertensive. Imaging revealed a left hip fracture. Labs were notable for hypokalemia. Her EKG was NSR and CXR showed no acute findings. Pt to be admitted for orthopedic evaluation.    Assessment/Plan:      Fall with left hip fracture   - monitor on tele   - imaging reviewed   - narcotic analgesia   - antiemetics   - NPO at MN   - orthopedic consult  Hypokalemia   - replete, monitor  GERD   - PPI  HLD   - statin    Labs/medications/imaging reviewed    Dispo: High level medical complexity with complicated medical decision-making                  Discussed management and the need for Hospitalization of the patient w/ the Emergency Department Provider: Yes    CXR: I have reviewed the CXR with the following interpretation: NAPD  EKG:  I have reviewed the EKG with the following interpretation: NSR    Physical Exam Performed:      General appearance:  No apparent distress, appears stated age and cooperative.  HEENT:  Pupils equal, round, and reactive to light. Conjunctivae/corneas clear.  Respiratory:  Normal respiratory effort. Clear to auscultation bilaterally without Rales/Wheezes/Rhonchi.  Cardiovascular:  Regular rate and rhythm with normal S1/S2 without murmurs, rubs or gallops.  Abdomen:  Soft, non-tender, non-distended with normal bowel

## 2025-06-22 NOTE — CONSULTS
[facility] C5 - MED SURG/ORTHO  [address] [address]  Dept: [phone]  Loc: [phone]    Inpatient Orthopedic Consult      CHIEF COMPLAINT:    left hip pain    HISTORY OF PRESENT ILLNESS:      The patient is a 79 y.o. female who is being seen as an inpatient for consultation and evaluation of the above complaint which occurred secondary to a fall from standing height. The patient experienced immediate pain at the hip and difficulty with ambulation/weight bearing. The patient was brought to the emergency department where imaging revealed a hip fracture, and Orthopaedics was consulted for evaluation and definitive treatment. The patient localizes the pain to the above hip. Prior to this, the patient used no assistive devices for ambulation.     History is obtained today from:   [x]  the patient     [x]  EMR     []  one family member/friend    []  multiple family members/friends    [x]  other: ER nurse practitioner, internal medicine          REVIEW OF SYSTEMS:  Constitutional: Negative for fever.   HENT: Negative for tinnitus.    Eyes: Negative for pain.   Respiratory: Negative for shortness of breath.    Cardiovascular: Negative for chest pain.   Gastrointestinal: Negative for abdominal pain.   Genitourinary: Negative for dysuria.   Skin: Negative for rash.   Neurological: Negative for headaches.   Hematological: Does not bruise/bleed easily.   Musculoskeletal: See HPI for pertinent positives     Past Medical History:    Past Medical History:   Diagnosis Date    Anxiety 2/6/2013    Dyslipidemia 05/03/2013    Fibromyalgia     GERD (gastroesophageal reflux disease)     Hypercholesterolemia 6/11/2014    Osteopenia     Pre-diabetes 05/03/2013       Past Surgical History:    Past Surgical History:   Procedure Laterality Date    CHOLECYSTECTOMY, LAPAROSCOPIC N/A 03/17/2017    COLONOSCOPY  1993    poor prep    COLONOSCOPY  05/03/2013    WNL    COLONOSCOPY  02/2017    EYE

## 2025-06-22 NOTE — OP NOTE
[facility]  [facility] OR  [address] [address]  Dept: [phone]  Loc: [phone]      Orthopedic Surgery Operative Report      Patient: Lexi Benitez      MRN#: [id number]     YOB: 1945      Date of Admission: 6/21/2025    Attending Surgeon: Ashutosh Linares M.D.   PCP: Geoffrey Caceres DO        Preoperative Diagnosis:   Left closed displaced femoral neck fracture  History of fibromyalgia  History of impaired fasting glucose  History of osteopenia    Postoperative Diagnosis:   same    Procedures Performed:  (6/22/2025)  Open treatment of Left displaced femoral neck fracture with prosthetic replacement       Implants:     [employer] Synthes Actis bipolar hip system with size 3 stem, bipolar head size 46 mm, femoral head +5 mm  * No implants in log *      Attending Surgeon:     Dung Linares MD      Assistant Surgeon:    Surgical Assistant: Sacha Schroeder      Anesthesia:    General      Staff:  Surgeon(s):  Dung Linares MD  Surgical Assistant: Sacha Schroeder  Scrub Person First: Hilary Deng RN  Anesthesia: Geoffrey Lainez MD      Estimated Blood Loss:    ~100 cc      Complications:    None      Specimen:    * No specimens in log *      History:    Ms. Lexi Benitez is a 79 y.o. female who was seen recently for the above problem.     She has a history of a left displaced femoral neck fracture, sustained 6/21/2025.      Notably, she has the past medical history as above. She has a history of GERD, anxiety/depression, vitamin D deficiency with osteopenia, lumbar facet arthropathy, fibromyalgia, and impaired fasting glucose.       She is currently medically stable and appropriate for the planned procedure.     We have spoken about the risks/benefits/alternatives to a surgical intervention, verbal understanding of these risks was expressed, and informed consent was obtained. All questions were answered. No guarantees were made or implied. I have answered

## 2025-06-22 NOTE — ED PROVIDER NOTES
[facility] C5 - MED SURG/ORTHO  EMERGENCY DEPARTMENT ENCOUNTER        Pt Name: Lexi Benitez  MRN: [id number]  Birthdate 1945  Date of evaluation: 6/21/2025  Provider: STONE Vazquez - CNP  PCP: Geoffrey Caceres DO  Note Started: 1:16 AM EDT 6/22/25      XIANG. I have evaluated this patient.        CHIEF COMPLAINT       Chief Complaint   Patient presents with    Fall     Patient fell off a little ledge outside of her garage. Patient denies hitting her head but states she hit the side of her face. Patient rates pain 10. Patient denies being on blood thinners.       HISTORY OF PRESENT ILLNESS: 1 or more Elements     History From: the patient  Limitations to history : None    Lexi Benitez is a 79 y.o. female who presents to the ER today with clinical fall.  Patient reports a mechanical fall today, complaining of left hip pain.  There is some shortening and rotation noted to the left hip.  She did hit her head but denied loss of consciousness.    Nursing Notes were all reviewed and agreed with or any disagreements were addressed in the HPI.    REVIEW OF SYSTEMS :      Review of Systems    Positives and Pertinent negatives as per HPI.     SURGICAL HISTORY     Past Surgical History:   Procedure Laterality Date    CHOLECYSTECTOMY, LAPAROSCOPIC N/A 03/17/2017    COLONOSCOPY  1993    poor prep    COLONOSCOPY  05/03/2013    WNL    COLONOSCOPY  02/2017    EYE SURGERY      GALLBLADDER SURGERY      HYSTERECTOMY (CERVIX STATUS UNKNOWN)      LASIK  10/23/2012    TONSILLECTOMY      UPPER GASTROINTESTINAL ENDOSCOPY  05/03/2013    WNL    UPPER GASTROINTESTINAL ENDOSCOPY  02/2017    UPPER GASTROINTESTINAL ENDOSCOPY N/A 7/18/2022    EGD BIOPSY performed by Cody Killian MD at [facility] ENDOSCOPY    UPPER GASTROINTESTINAL ENDOSCOPY  7/18/2022    EGD ESOPHAGOGASTRODUODENOSCOPY DILATATION performed by Cody Killian MD at [facility] ENDOSCOPY       CURRENTMEDICATIONS       Current Discharge Medication List        CONTINUE

## 2025-06-22 NOTE — ED PROVIDER NOTES
Emergency Department Attending Provider Note  Location: [place] [address] - MED SURG/ORTHO  6/21/2025     Patient Identification  Lexi Benitez is a 79 y.o. female evaluated in the Emergency Department for Fall (Patient fell off a little ledge outside of her garage. Patient denies hitting her head but states she hit the side of her face. Patient rates pain 10. Patient denies being on blood thinners.)      HPI: as noted above    Physical Exam:   See vital signs, normal mental status, clear breath sounds, soft nontender abdomen, left lower extremity externally rotated and shortened, 2+ DP, sensation intact      MDM:  Patient seen and evaluated.  Relevant records reviewed.  Patient presents with symptoms noted above.   Patient presents with after mechanical fall with left hip pain  No open wounds.  Neurovascularly intact.  X-ray shows proximal femur fracture  Ortho aware.  Will plan for inpatient management  Clinical Impression  1. Closed fracture of left hip, initial encounter ([facility])    2. Pneumonia of left lung due to infectious organism, unspecified part of lung    3. Fall, initial encounter        Although initial history and physical exam information was obtained by XIANG/NPP/MD/DO (who also dictated a record of this visit), I personally saw the patient and made/approved the management plan and take responsibility for patient management. I, Dr. Banuelos, am the primary clinician of record.     This chart was generated in part by using Dragon Dictation system and may contain errors related to that system including errors in grammar, punctuation, and spelling, as well as words and phrases that may be inappropriate. If there are any questions or concerns please feel free to contact the dictating provider for clarification.     Juancho Banuelos MD   Acute Care [facility]       Juancho Banuelos MD  06/21/25 1263

## 2025-06-22 NOTE — PROGRESS NOTES
Pt brought to PACU. Report obtained from OR RN and anesthesia. Pt placed on monitor NSR and O2 at 97% on RA.

## 2025-06-22 NOTE — PROGRESS NOTES
Postop PLAN Note    Patient:  Lexi Benitez  YOB: 1945     79 y.o. female      Plan:   -Posterior hip precautions:  avoid flexion and internal rotation of hip, do not cross leg over other, avoid bending at the waist past 90 degrees. Abduction pillow at all times when in bed.     -WB'ing status: WBAT  -Pain control  -20 tabs Percocet  -DVT ppx  -Early mobilization  -Lovenox 40 mg subQ q Day x 6 weeks  -okay to begin postop day 1 from an orthopedic perspective  -Dispo  -per PT recommendations, but ok to discharge from an Ortho perspective when pain controlled  -  -f/u in office in 2 weeks

## 2025-06-22 NOTE — PROGRESS NOTES
[facility] Medicine Progress Note  V 5.17      Date of Admission: 6/21/2025    Hospital Day: 2      Chief Admission Complaint:  Fall, left hip fracture     Subjective:  Patient seen today at bedside. Scheduled for procedure.    Presenting Admission History:       79 y.o. female with PMHx of HLD, osteoporoisis, fibromyalgia, GERD, depression presents to ER after having fall. Pt was sweeping her driveway when she lost her balance and fell landing on her left side. Denies any head trauma. She was unable to bear weight and had significant pain. She was brought to the ED.  In the ED, she was hypertensive. Imaging revealed a left hip fracture. Labs were notable for hypokalemia. Her EKG was NSR and CXR showed no acute findings. Pt to be admitted for orthopedic evaluation.    Assessment/Plan:      Fall with left hip fracture  X-Ray L Femur: Angulated subcapital femoral neck fracture of the left hip.               - monitored on tele              - imaging reviewed              - narcotic analgesia. Adjust pain regimen as needed              - antiemetics              - orthopedic consulted, appreciate recommendations: procedure planned today    Cystitis  Continue to monitor  Ceftriaxone begun  Intake and output    Hypokalemia              - replete, monitor  GERD              - PPI  HLD              - statin    Ongoing threat to life and/or bodily function without ongoing treatment due to:  Fall, hip fracture and cystitis    Consults:      IP CONSULT TO ORTHOPEDIC SURGERY        --------------------------------------------------      Medications:        Infusion Medications    sodium chloride      lactated ringers 75 mL/hr at 06/22/25 0015     Scheduled Medications    cefTRIAXone (ROCEPHIN) IV  1,000 mg IntraVENous Q24H    sodium chloride flush  5-40 mL IntraVENous 2 times per day    enoxaparin  40 mg SubCUTAneous Daily    atorvastatin  40 mg Oral Nightly    albuterol  0.63 mg Nebulization Once    pantoprazole  40 mg

## 2025-06-22 NOTE — PLAN OF CARE
Problem: Discharge Planning  Goal: Discharge to home or other facility with appropriate resources  Outcome: Progressing     Problem: Pain  Goal: Verbalizes/displays adequate comfort level or baseline comfort level  Outcome: Progressing   Pt scoring pain on 0-10 scale. Pain medications given per MAR. Pt instructed to call out when pain level increasing. Call light within reach.     Problem: Safety - Adult  Goal: Free from fall injury  Outcome: Progressing  Bed in lowest position, wheels locked, 2/4 side rails up, nonskid footwear on. Bed/ chair check alarm in place, call light within reach. Pt instructed to call out when needing assistance. Pt stated understanding.

## 2025-06-22 NOTE — PLAN OF CARE
Problem: Discharge Planning  Goal: Discharge to home or other facility with appropriate resources  6/22/2025 1026 by Annabelle Joseph RN  Outcome: Progressing  6/21/2025 2257 by Mandy William RN  Outcome: Progressing     Problem: Pain  Goal: Verbalizes/displays adequate comfort level or baseline comfort level  6/22/2025 1026 by Annabelle Joseph RN  Outcome: Progressing  6/21/2025 2257 by Mandy William RN  Outcome: Progressing     Problem: Safety - Adult  Goal: Free from fall injury  6/22/2025 1026 by Annabelle Joseph RN  Outcome: Progressing  6/21/2025 2257 by Mandy William RN  Outcome: Progressing

## 2025-06-22 NOTE — ED NOTES
Lexi Benitez is a 79 y.o. female admitted for  Principal Problem:    Fall at home, initial encounter  Resolved Problems:    * No resolved hospital problems. *  .   Patient Home via EMS transportation with   Chief Complaint   Patient presents with    Fall     Patient fell off a little ledge outside of her garage. Patient denies hitting her head but states she hit the side of her face. Patient rates pain 10. Patient denies being on blood thinners.   .  Patient is alert and Person, Place, Time, and Situation  Patient's baseline mobility: Baseline Mobility: pt has broken hip, cannot stand  Code Status: Full Code   Cardiac Rhythm:       Is patient on baseline Oxygen: no  Abnormal Assessment Findings: L hip pain      NIH Score:    C-SSRS: Risk of Suicide: No Risk  Bedside swallow:        Active LDA's:   Peripheral IV 06/21/25 Left Wrist (Active)     Patient admitted with a turner: no      Family/Caregiver Present yes Any Concerns: no   Restraints no  Sitter no         Vitals: MEWS Score: 1    Vitals:    06/21/25 1827   BP: (!) 144/90   Pulse: 100   Resp: 16   Temp: 98.1 °F (36.7 °C)   TempSrc: Oral   SpO2: 97%   Weight: 63 kg (139 lb)   Height: 1.702 m (5' 7\")       Last documented pain score (0-10 scale) Pain Level: 10  Pain medication administered Yes- see MAR.    Pertinent or High Risk Medications/Drips: No.    Pending Blood Product Administration: no    Abnormal labs:   Abnormal Labs Reviewed   COMPREHENSIVE METABOLIC PANEL - Abnormal; Notable for the following components:       Result Value    Potassium 3.1 (*)     Chloride 112 (*)     CO2 18 (*)     Glucose 103 (*)     Calcium 8.0 (*)     Total Protein 5.3 (*)     All other components within normal limits     Critical values: no  Intervention for critical value(s):     Abnormal Imaging: yes, fracture, see imaging            You may also review the ED PT Care Timeline found under the Summary Tab, ED Encounter Summary, Timeline Reports, ED Patient Care Timeline.

## 2025-06-22 NOTE — PROGRESS NOTES
4 Eyes Skin Assessment     NAME:  Lexi Benitez  YOB: 1945  MEDICAL RECORD NUMBER:  [id number]    The patient is being assessed for  Admission    I agree that at least one RN has performed a thorough Head to Toe Skin Assessment on the patient. ALL assessment sites listed below have been assessed.      Areas assessed by both nurses:    Head, Face, Ears, Shoulders, Back, Chest, Arms, Elbows, Hands, Sacrum. Buttock, Coccyx, Ischium, Legs. Feet and Heels, and Under Medical Devices         Does the Patient have a Wound? abrasion noted on left elbow. Open to air No noted wound(s)       James Prevention initiated by RN: Yes  Wound Care Orders initiated by RN: No    Pressure Injury (Stage 3,4, Unstageable, DTI, NWPT, and Complex wounds) if present, place Wound referral order by RN under : No    New Ostomies, if present place, Ostomy referral order under : No     Nurse 1 eSignature: Electronically signed by Mandy William RN on 6/22/25 at 12:10 AM EDT    **SHARE this note so that the co-signing nurse can place an eSignature**    Nurse 2 eSignature: Electronically signed by Elin Alva RN on 6/22/25 at 3:40 AM EDT

## 2025-06-22 NOTE — PROGRESS NOTES
Patient placed back on portable telemetry monitor. Verified with [facility] that they can see their heart rhythm. Ring placed back on left ring finger.

## 2025-06-23 ENCOUNTER — TELEPHONE (OUTPATIENT)
Dept: ORTHOPEDIC SURGERY | Age: 80
End: 2025-06-23

## 2025-06-23 PROCEDURE — 1200000000 HC SEMI PRIVATE

## 2025-06-23 PROCEDURE — 2500000003 HC RX 250 WO HCPCS: Performed by: ORTHOPAEDIC SURGERY

## 2025-06-23 PROCEDURE — 97165 OT EVAL LOW COMPLEX 30 MIN: CPT

## 2025-06-23 PROCEDURE — 97162 PT EVAL MOD COMPLEX 30 MIN: CPT

## 2025-06-23 PROCEDURE — 97535 SELF CARE MNGMENT TRAINING: CPT

## 2025-06-23 PROCEDURE — 97116 GAIT TRAINING THERAPY: CPT

## 2025-06-23 PROCEDURE — 6370000000 HC RX 637 (ALT 250 FOR IP): Performed by: ORTHOPAEDIC SURGERY

## 2025-06-23 PROCEDURE — 6360000002 HC RX W HCPCS: Performed by: ORTHOPAEDIC SURGERY

## 2025-06-23 PROCEDURE — 6360000002 HC RX W HCPCS

## 2025-06-23 PROCEDURE — 97530 THERAPEUTIC ACTIVITIES: CPT

## 2025-06-23 PROCEDURE — 6370000000 HC RX 637 (ALT 250 FOR IP): Performed by: INTERNAL MEDICINE

## 2025-06-23 RX ORDER — PANTOPRAZOLE SODIUM 40 MG/1
40 TABLET, DELAYED RELEASE ORAL
Status: DISCONTINUED | OUTPATIENT
Start: 2025-06-23 | End: 2025-06-25 | Stop reason: HOSPADM

## 2025-06-23 RX ADMIN — PANTOPRAZOLE SODIUM 40 MG: 40 TABLET, DELAYED RELEASE ORAL at 08:47

## 2025-06-23 RX ADMIN — Medication 10 ML: at 19:46

## 2025-06-23 RX ADMIN — KETOROLAC TROMETHAMINE 15 MG: 30 INJECTION, SOLUTION INTRAMUSCULAR at 19:49

## 2025-06-23 RX ADMIN — WATER 1000 MG: 1 INJECTION INTRAMUSCULAR; INTRAVENOUS; SUBCUTANEOUS at 11:31

## 2025-06-23 RX ADMIN — Medication 10 ML: at 08:49

## 2025-06-23 RX ADMIN — OXYCODONE AND ACETAMINOPHEN 2 TABLET: 5; 325 TABLET ORAL at 10:47

## 2025-06-23 RX ADMIN — ATORVASTATIN CALCIUM 40 MG: 40 TABLET, FILM COATED ORAL at 19:45

## 2025-06-23 RX ADMIN — KETOROLAC TROMETHAMINE 15 MG: 30 INJECTION, SOLUTION INTRAMUSCULAR at 05:06

## 2025-06-23 RX ADMIN — ENOXAPARIN SODIUM 40 MG: 100 INJECTION SUBCUTANEOUS at 08:47

## 2025-06-23 RX ADMIN — PANTOPRAZOLE SODIUM 40 MG: 40 TABLET, DELAYED RELEASE ORAL at 16:35

## 2025-06-23 ASSESSMENT — PAIN SCALES - GENERAL
PAINLEVEL_OUTOF10: 4
PAINLEVEL_OUTOF10: 7
PAINLEVEL_OUTOF10: 6
PAINLEVEL_OUTOF10: 8

## 2025-06-23 ASSESSMENT — PAIN DESCRIPTION - LOCATION
LOCATION: LEG
LOCATION: LEG;BACK
LOCATION: LEG;HIP
LOCATION: LEG;HIP

## 2025-06-23 ASSESSMENT — PAIN DESCRIPTION - DESCRIPTORS
DESCRIPTORS: ACHING
DESCRIPTORS: ACHING
DESCRIPTORS: ACHING;DISCOMFORT

## 2025-06-23 ASSESSMENT — PAIN DESCRIPTION - ORIENTATION
ORIENTATION: LEFT
ORIENTATION: LEFT
ORIENTATION: LEFT;LOWER
ORIENTATION: LEFT

## 2025-06-23 NOTE — PROGRESS NOTES
Occupational Therapy  Facility/Department: [place] [address] - MED SURG/ORTHO  Occupational Therapy Initial Assessment    Name: Lexi Benitez  : 1945  MRN: [id number]  Date of Service: 2025    Discharge Recommendations:  IP Rehab  OT Equipment Recommendations  Equipment Needed: Yes  Mobility Devices: Walker;ADL Assistive Devices  Walker: Rolling  ADL Assistive Devices: Toileting - 3-in-1 Commode;Shower Chair with back;Grab Bars - shower;Reacher;Sock-Aid Hard;Long-handled Shoe Horn;Long-handled Sponge;Walker Basket  Other: for adherence to posterior hip precautions and fall prevention.     Therapy discharge recommendations take into account each patient's current medical complexities and are subject to input/oversight from the patient's healthcare team.   Barriers to Home Discharge:   [] Steps to access home entry or bed/bath:   [x] Unable to transfer, ambulate, or propel wheelchair household distances without assist   [x] Limited available assist at home upon discharge    [x] Patient or family requests d/c to post-acute facility    [] Poor cognition/safety awareness for d/c to home alone   [] Unable to maintain ordered weight bearing status    [] Patient with salient signs of long-standing immobility   [x] Other: Decreased IND with ADLs    Patient Diagnosis(es): The primary encounter diagnosis was Closed fracture of left hip, initial encounter ([facility]). Diagnoses of Pneumonia of left lung due to infectious organism, unspecified part of lung and Fall, initial encounter were also pertinent to this visit.  Past Medical History:  has a past medical history of Anxiety, Dyslipidemia, Fibromyalgia, GERD (gastroesophageal reflux disease), Hypercholesterolemia, Osteopenia, and Pre-diabetes.  Past Surgical History:  has a past surgical history that includes Hysterectomy; Tonsillectomy; eye surgery; LASIK (10/23/2012); Upper gastrointestinal endoscopy (2013); Colonoscopy (); Colonoscopy (2013); Colonoscopy

## 2025-06-23 NOTE — TELEPHONE ENCOUNTER
LVM for patient to call back to schedule her 2 week, 6 week, and 12 week post op appointments. I gave the patient my office number and main number for call backs.

## 2025-06-23 NOTE — PLAN OF CARE
Problem: Discharge Planning  Goal: Discharge to home or other facility with appropriate resources  6/23/2025 1017 by Annabelle Joseph RN  Outcome: Progressing  6/22/2025 2223 by Elin Alva RN  Outcome: Progressing     Problem: Pain  Goal: Verbalizes/displays adequate comfort level or baseline comfort level  6/23/2025 1017 by Annabelle Joseph RN  Outcome: Progressing  Flowsheets (Taken 6/23/2025 1017)  Verbalizes/displays adequate comfort level or baseline comfort level:   Encourage patient to monitor pain and request assistance   Assess pain using appropriate pain scale   Administer analgesics based on type and severity of pain and evaluate response   Implement non-pharmacological measures as appropriate and evaluate response  6/22/2025 2223 by Elin Alva RN  Outcome: Progressing     Problem: Safety - Adult  Goal: Free from fall injury  6/23/2025 1017 by Annabelel Joseph RN  Outcome: Progressing  Flowsheets (Taken 6/23/2025 1017)  Free From Fall Injury: Instruct family/caregiver on patient safety  6/22/2025 2223 by Elin Alva RN  Outcome: Progressing     Problem: Skin/Tissue Integrity  Goal: Skin integrity remains intact  Description: 1.  Monitor for areas of redness and/or skin breakdown  2.  Assess vascular access sites hourly  3.  Every 4-6 hours minimum:  Change oxygen saturation probe site  4.  Every 4-6 hours:  If on nasal continuous positive airway pressure, respiratory therapy assess nares and determine need for appliance change or resting period  6/23/2025 1017 by Annabelle Joseph RN  Outcome: Progressing  6/22/2025 2223 by Elin Alva RN  Outcome: Progressing  Flowsheets (Taken 6/22/2025 2046)  Skin Integrity Remains Intact: Monitor for areas of redness and/or skin breakdown

## 2025-06-23 NOTE — PLAN OF CARE
Problem: Discharge Planning  Goal: Discharge to home or other facility with appropriate resources  6/22/2025 2223 by Elin Alva, RN  Outcome: Progressing  6/22/2025 1026 by Annabelle Joseph RN  Outcome: Progressing     Problem: Pain  Goal: Verbalizes/displays adequate comfort level or baseline comfort level  6/22/2025 2223 by Elin Alva RN  Outcome: Progressing  6/22/2025 1026 by Annabelle Joseph RN  Outcome: Progressing     Problem: Safety - Adult  Goal: Free from fall injury  6/22/2025 2223 by Elin Alva, RN  Outcome: Progressing  6/22/2025 1026 by Annabelle Joseph RN  Outcome: Progressing     Problem: Skin/Tissue Integrity  Goal: Skin integrity remains intact  Description: 1.  Monitor for areas of redness and/or skin breakdown  2.  Assess vascular access sites hourly  3.  Every 4-6 hours minimum:  Change oxygen saturation probe site  4.  Every 4-6 hours:  If on nasal continuous positive airway pressure, respiratory therapy assess nares and determine need for appliance change or resting period  Outcome: Progressing

## 2025-06-23 NOTE — PROGRESS NOTES
Physical Therapy  Facility/Department: [place] [address] - MED SURG/ORTHO  Physical Therapy Initial Assessment/treatment    Name: Lexi Benitez  : 1945  MRN: [id number]  Date of Service: 2025    Discharge Recommendations:  Patient able to tolerate 3 hours of therapy per day, IP Rehab   PT Equipment Recommendations  Equipment Needed: No  Other: defer    Therapy discharge recommendations take into account each patient's current medical complexities and are subject to input/oversight from the patient's healthcare team.   Barriers to Home Discharge:   [] Steps to access home entry or bed/bath:   [x] Unable to transfer, ambulate, or propel wheelchair household distances without assist   [x] Limited available assist at home upon discharge - spouse unable to physically assist   [] Patient or family requests d/c to post-acute facility    [] Poor cognition/safety awareness for d/c to home alone    []Unable to maintain ordered weight bearing status    [x] Patient with salient signs of long-standing immobility   [x] Patient is at risk for falls    [] Other:    If pt is unable to be seen after this session, please let this note serve as discharge summary.  Please see case management note for discharge disposition.  Thank you.    Patient Diagnosis(es): The primary encounter diagnosis was Closed fracture of left hip, initial encounter ([facility]). Diagnoses of Pneumonia of left lung due to infectious organism, unspecified part of lung and Fall, initial encounter were also pertinent to this visit.  Past Medical History:  has a past medical history of Anxiety, Dyslipidemia, Fibromyalgia, GERD (gastroesophageal reflux disease), Hypercholesterolemia, Osteopenia, and Pre-diabetes.  Past Surgical History:  has a past surgical history that includes Hysterectomy; Tonsillectomy; eye surgery; LASIK (10/23/2012); Upper gastrointestinal endoscopy (2013); Colonoscopy (); Colonoscopy (2013); Colonoscopy (2017); Upper

## 2025-06-23 NOTE — CARE COORDINATION
Case Management Assessment  Initial Evaluation    Date/Time of Evaluation: 6/23/2025 10:02 AM  Assessment Completed by: Ayla Jimenez RN    If patient is discharged prior to next notation, then this note serves as note for discharge by case management.    Patient Name: Lexi Benitez                   YOB: 1945  Diagnosis: Fall at home, initial encounter [W19.XXXA, Y92.009]  Pneumonia of left lung due to infectious organism, unspecified part of lung [J18.9]                   Date / Time: 6/21/2025  6:29 PM    Patient Admission Status: Inpatient   Readmission Risk (Low < 19, Mod (19-27), High > 27): Readmission Risk Score: 10.7    Current PCP: Geoffrey Caceres, DO  PCP verified by CM? Yes (DO Morris)    Chart Reviewed: Yes      History Provided by: Patient, Medical Record  Patient Orientation: Alert and Oriented    Patient Cognition: Alert    Hospitalization in the last 30 days (Readmission):  No    If yes, Readmission Assessment in CM Navigator will be completed.    Advance Directives:      Code Status: Full Code   Patient's Primary Decision Maker is: Legal Next of Kin    Primary Decision Maker (Active): LamoreSalvador - Spouse - [phone]    Secondary Decision Maker: Karli Bennett - Child - [phone]    Supplemental (Other) Decision Maker: Marielena Hudson - Child - [phone]    Discharge Planning:    Patient lives with: Spouse/Significant Other Type of Home: Apartment  Primary Care Giver: Self  Patient Support Systems include: Spouse/Significant Other, Children, Family Members   Current Financial resources: Medicare  Current community resources: None  Current services prior to admission: None            Current DME:              Type of Home Care services:  None    ADLS  Prior functional level: Independent in ADLs/IADLs  Current functional level: Assistance with the following:, Housework, Cooking, Shopping, Mobility    PT AM-PAC:   /24  OT AM-PAC:   /24    Family can provide assistance at DC:

## 2025-06-23 NOTE — PROGRESS NOTES
[facility] Medicine Progress Note  V 5.17      Date of Admission: 6/21/2025    Hospital Day: 3      Chief Admission Complaint:   s/p fall and has left hip fracture.    Subjective: She is sitting up in a chair, states she has pain in her left hip.  Denies chest pain or shortness of breath.  Asking for Forde catheter to be removed.   at the bedside discussed the case with him    Presenting Admission History:       79 y.o. female with PMHx of HLD, osteoporoisis, fibromyalgia, GERD, depression presented to ER after having fall. Pt was sweeping her driveway when she lost her balance and fell landing on her left side. Denied any head trauma. She was unable to bear weight and had significant pain. She was brought to the ED.  In the ED, she was hypertensive. Imaging revealed a left hip fracture. Labs were notable for hypokalemia. Her EKG was NSR and CXR showed no acute findings.   She was admitted for further evaluation and management  Assessment/Plan:      S/P Fall with resultant left hip fracture:  X-ray revealed angulated left subcapital femoral neck fracture.  Orthopedic surgery consulted and appreciate input.  She did go to the OR on 6/22/2025 with orthopedics for open treatment left displaced femoral neck fracture with prosthetic replacement.  She is weightbearing as tolerated with posterior hip precautions the left hip for 3 months.  Continue analgesics.  PT/OT consulted    GERD : Currently stable continue on Protonix    Hypokalemia: Repleted and will follow.    HyperLipidemia - normally controlled on home Statin. Continued.  Follow up w/ PCP outpatient for medication initiation and/or adjustment as needed.       Acute cystitis: Continue on IV antibiotics with ceftriaxone.  Will discontinue Forde catheter today      Ongoing threat to life and/or bodily function without ongoing treatment due to:    Left hip fracture  Need for surgical treatment  Need for PT/OT    Consults:      IP CONSULT TO ORTHOPEDIC

## 2025-06-23 NOTE — PROGRESS NOTES
Ortho Care Plan    Patient had a partial hip replacement on 06/22/2025.  .    Comorbidities Reviewed Yes   Review completed by Elin Alva RN      Patient has a past medical history of Anxiety, Dyslipidemia, Fibromyalgia, GERD (gastroesophageal reflux disease), Hypercholesterolemia, Osteopenia, and Pre-diabetes.       Commodities addressed via home medications ordered, PRN medications ordered, education provided, and interdisciplinary team members involved      Patient and/or Family's stated Goal of Care this Admission: reduce pain, increase activity tolerance, improve mobility, Understand the effects of joint replacement on commodities, understand that blood glucose levels may fluctuate and need closer monitoring, and understand use and effect of cough and deep breath/incentive spirometer prior to discharge.     >>For Ortho and Comorbidity documentation on Education, please see Education Tab.

## 2025-06-23 NOTE — PROGRESS NOTES
Postop Note    Patient:  Lexi Benitez  YOB: 1945     79 y.o. female      Subjective:  Patient seen and examined in the hospital. Pain controlled.       Objective:   Vitals:    06/23/25 1627   BP: (!) 125/55   Pulse: 87   Resp: 14   Temp:    SpO2: 96%     Gen: NAD, awake   MSK:  -Dressing in place, clean/dry/intact  -Toes warm/well-perfused  Grossly NVI   -Thigh/leg compartments soft/compressible, able to wiggle toes and plantarflex/dorsiflex ankle appropriately       Impression/plan:   79 y.o. female 1 Day Post-Op, doing well overall        -Posterior hip precautions:  avoid flexion and internal rotation of hip, do not cross leg over other, avoid bending at the waist past 90 degrees. Abduction pillow at all times when in bed.     -WB'ing status: WBAT  -Pain control  -20 tabs Percocet  -DVT ppx  -Early mobilization  -Lovenox 40 mg subQ q Day x 6 weeks  -okay to begin postop day 1 from an orthopedic perspective  -Dispo  -per PT recommendations, but ok to discharge from an Ortho perspective when pain controlled  -  -f/u in office in 2 weeks

## 2025-06-24 ENCOUNTER — TELEPHONE (OUTPATIENT)
Dept: ORTHOPEDIC SURGERY | Age: 80
End: 2025-06-24

## 2025-06-24 LAB
ANION GAP SERPL CALCULATED.3IONS-SCNC: 9 MMOL/L (ref 3–16)
BUN SERPL-MCNC: 16 MG/DL (ref 7–20)
CALCIUM SERPL-MCNC: 8.6 MG/DL (ref 8.3–10.6)
CHLORIDE SERPL-SCNC: 103 MMOL/L (ref 99–110)
CO2 SERPL-SCNC: 24 MMOL/L (ref 21–32)
CREAT SERPL-MCNC: 0.8 MG/DL (ref 0.6–1.2)
DEPRECATED RDW RBC AUTO: 14.1 % (ref 12.4–15.4)
GFR SERPLBLD CREATININE-BSD FMLA CKD-EPI: 75 ML/MIN/{1.73_M2}
GLUCOSE SERPL-MCNC: 103 MG/DL (ref 70–99)
HCT VFR BLD AUTO: 29.7 % (ref 36–48)
HGB BLD-MCNC: 10.2 G/DL (ref 12–16)
MAGNESIUM SERPL-MCNC: 2.32 MG/DL (ref 1.8–2.4)
MCH RBC QN AUTO: 30.7 PG (ref 26–34)
MCHC RBC AUTO-ENTMCNC: 34.5 G/DL (ref 31–36)
MCV RBC AUTO: 89 FL (ref 80–100)
PLATELET # BLD AUTO: 174 K/UL (ref 135–450)
PMV BLD AUTO: 7 FL (ref 5–10.5)
POTASSIUM SERPL-SCNC: 4.3 MMOL/L (ref 3.5–5.1)
RBC # BLD AUTO: 3.34 M/UL (ref 4–5.2)
SODIUM SERPL-SCNC: 136 MMOL/L (ref 136–145)
WBC # BLD AUTO: 6.2 K/UL (ref 4–11)

## 2025-06-24 PROCEDURE — 97530 THERAPEUTIC ACTIVITIES: CPT

## 2025-06-24 PROCEDURE — 97116 GAIT TRAINING THERAPY: CPT

## 2025-06-24 PROCEDURE — 83735 ASSAY OF MAGNESIUM: CPT

## 2025-06-24 PROCEDURE — 97110 THERAPEUTIC EXERCISES: CPT

## 2025-06-24 PROCEDURE — 6360000002 HC RX W HCPCS: Performed by: ORTHOPAEDIC SURGERY

## 2025-06-24 PROCEDURE — 1200000000 HC SEMI PRIVATE

## 2025-06-24 PROCEDURE — 6370000000 HC RX 637 (ALT 250 FOR IP): Performed by: ORTHOPAEDIC SURGERY

## 2025-06-24 PROCEDURE — 2500000003 HC RX 250 WO HCPCS: Performed by: ORTHOPAEDIC SURGERY

## 2025-06-24 PROCEDURE — 80048 BASIC METABOLIC PNL TOTAL CA: CPT

## 2025-06-24 PROCEDURE — 97535 SELF CARE MNGMENT TRAINING: CPT

## 2025-06-24 PROCEDURE — 85027 COMPLETE CBC AUTOMATED: CPT

## 2025-06-24 PROCEDURE — 36415 COLL VENOUS BLD VENIPUNCTURE: CPT

## 2025-06-24 PROCEDURE — 6370000000 HC RX 637 (ALT 250 FOR IP): Performed by: INTERNAL MEDICINE

## 2025-06-24 RX ORDER — SODIUM CHLORIDE 9 MG/ML
INJECTION, SOLUTION INTRAVENOUS PRN
Status: CANCELLED | OUTPATIENT
Start: 2025-06-24

## 2025-06-24 RX ORDER — ATORVASTATIN CALCIUM 40 MG/1
40 TABLET, FILM COATED ORAL NIGHTLY
Status: CANCELLED | OUTPATIENT
Start: 2025-06-24

## 2025-06-24 RX ORDER — POLYETHYLENE GLYCOL 3350 17 G/17G
17 POWDER, FOR SOLUTION ORAL DAILY
Status: DISCONTINUED | OUTPATIENT
Start: 2025-06-24 | End: 2025-06-25 | Stop reason: HOSPADM

## 2025-06-24 RX ORDER — BISACODYL 10 MG
10 SUPPOSITORY, RECTAL RECTAL DAILY PRN
Status: CANCELLED | OUTPATIENT
Start: 2025-06-24

## 2025-06-24 RX ORDER — OXYCODONE AND ACETAMINOPHEN 5; 325 MG/1; MG/1
1 TABLET ORAL EVERY 6 HOURS PRN
Refills: 0 | Status: CANCELLED | OUTPATIENT
Start: 2025-06-24

## 2025-06-24 RX ORDER — ACETAMINOPHEN 650 MG/1
650 SUPPOSITORY RECTAL EVERY 6 HOURS PRN
Status: CANCELLED | OUTPATIENT
Start: 2025-06-24

## 2025-06-24 RX ORDER — ACETAMINOPHEN 325 MG/1
650 TABLET ORAL EVERY 6 HOURS PRN
Status: CANCELLED | OUTPATIENT
Start: 2025-06-24

## 2025-06-24 RX ORDER — POLYETHYLENE GLYCOL 3350 17 G/17G
17 POWDER, FOR SOLUTION ORAL DAILY
Status: CANCELLED | OUTPATIENT
Start: 2025-06-25

## 2025-06-24 RX ORDER — POLYETHYLENE GLYCOL 3350 17 G/17G
17 POWDER, FOR SOLUTION ORAL DAILY PRN
Status: CANCELLED | OUTPATIENT
Start: 2025-06-24

## 2025-06-24 RX ORDER — SODIUM CHLORIDE 0.9 % (FLUSH) 0.9 %
5-40 SYRINGE (ML) INJECTION EVERY 12 HOURS SCHEDULED
Status: CANCELLED | OUTPATIENT
Start: 2025-06-24

## 2025-06-24 RX ORDER — ENOXAPARIN SODIUM 100 MG/ML
40 INJECTION SUBCUTANEOUS DAILY
Status: CANCELLED | OUTPATIENT
Start: 2025-06-25

## 2025-06-24 RX ORDER — PANTOPRAZOLE SODIUM 40 MG/1
40 TABLET, DELAYED RELEASE ORAL
Status: CANCELLED | OUTPATIENT
Start: 2025-06-25

## 2025-06-24 RX ORDER — SODIUM CHLORIDE 0.9 % (FLUSH) 0.9 %
5-40 SYRINGE (ML) INJECTION PRN
Status: CANCELLED | OUTPATIENT
Start: 2025-06-24

## 2025-06-24 RX ORDER — ONDANSETRON 4 MG/1
4 TABLET, ORALLY DISINTEGRATING ORAL EVERY 8 HOURS PRN
Status: CANCELLED | OUTPATIENT
Start: 2025-06-24

## 2025-06-24 RX ORDER — OXYCODONE AND ACETAMINOPHEN 5; 325 MG/1; MG/1
2 TABLET ORAL EVERY 6 HOURS PRN
Refills: 0 | Status: CANCELLED | OUTPATIENT
Start: 2025-06-24

## 2025-06-24 RX ADMIN — Medication 10 ML: at 09:09

## 2025-06-24 RX ADMIN — OXYCODONE AND ACETAMINOPHEN 2 TABLET: 5; 325 TABLET ORAL at 01:58

## 2025-06-24 RX ADMIN — ATORVASTATIN CALCIUM 40 MG: 40 TABLET, FILM COATED ORAL at 20:13

## 2025-06-24 RX ADMIN — PANTOPRAZOLE SODIUM 40 MG: 40 TABLET, DELAYED RELEASE ORAL at 16:17

## 2025-06-24 RX ADMIN — ONDANSETRON 4 MG: 2 INJECTION, SOLUTION INTRAMUSCULAR; INTRAVENOUS at 09:08

## 2025-06-24 RX ADMIN — ENOXAPARIN SODIUM 40 MG: 100 INJECTION SUBCUTANEOUS at 09:08

## 2025-06-24 RX ADMIN — Medication 10 ML: at 20:13

## 2025-06-24 RX ADMIN — OXYCODONE AND ACETAMINOPHEN 2 TABLET: 5; 325 TABLET ORAL at 16:17

## 2025-06-24 RX ADMIN — WATER 1000 MG: 1 INJECTION INTRAMUSCULAR; INTRAVENOUS; SUBCUTANEOUS at 11:38

## 2025-06-24 RX ADMIN — OXYCODONE AND ACETAMINOPHEN 2 TABLET: 5; 325 TABLET ORAL at 23:10

## 2025-06-24 RX ADMIN — OXYCODONE AND ACETAMINOPHEN 2 TABLET: 5; 325 TABLET ORAL at 09:08

## 2025-06-24 RX ADMIN — PANTOPRAZOLE SODIUM 40 MG: 40 TABLET, DELAYED RELEASE ORAL at 05:34

## 2025-06-24 ASSESSMENT — PAIN DESCRIPTION - ORIENTATION
ORIENTATION: LEFT

## 2025-06-24 ASSESSMENT — PAIN DESCRIPTION - DESCRIPTORS
DESCRIPTORS: ACHING
DESCRIPTORS: ACHING;BURNING
DESCRIPTORS: ACHING

## 2025-06-24 ASSESSMENT — PAIN SCALES - GENERAL
PAINLEVEL_OUTOF10: 5
PAINLEVEL_OUTOF10: 5
PAINLEVEL_OUTOF10: 9

## 2025-06-24 ASSESSMENT — PAIN DESCRIPTION - LOCATION
LOCATION: HIP

## 2025-06-24 ASSESSMENT — PAIN DESCRIPTION - PAIN TYPE: TYPE: ACUTE PAIN;SURGICAL PAIN

## 2025-06-24 NOTE — CARE COORDINATION
[facility] - Acute Rehab Unit   After review, this patient is felt to be:       []  Appropriate for Acute Inpatient Rehab    [x]  Appropriate for Acute Inpatient Rehab Pending Insurance Authorization    []  Not appropriate for Acute Inpatient Rehab    []  Referral received and ARU reviewing patient; Evaluation ongoing.      Can admit when stable.  Will notify DCP with further updates. Thank you for the referral.    Billy Perry MPH, RRT  ARU Admissions Supervisor-[facility]  (P)[phone]  (F)[phone]   Electronically signed by Billy Perry on 6/24/2025 at 9:29 AM

## 2025-06-24 NOTE — CONSULTS
Patient: Lexi Benitez  [id number]  Date: 6/24/2025      Chief Complaint: left hip pain    History of Present Illness/Hospital Course:  Lexi Benitez is a 79 year old female with a past medical history significant for fibromyalgia, HLD, anxiety, pre-diabetes, and GERD who presented to [facility] on 6/21/25 with left hip pain after a fall, found to have left hip fracture. Orthopedic Surgery was consulted and on 6/22 she underwent ORIF with prosthetic replacement. She is WBAT with posterior hip precautions. She continues to have functional deficits below her baseline. Today Lexi is seen with her  present. She reports some hip pain. She notes being independent at baseline. She lives with her  in a single level house with a threshold to enter. She is an active . She is retired from working in [employer]. She is motivated to work with therapies and is interested in [employer].      has a past medical history of Anxiety, Dyslipidemia, Fibromyalgia, GERD (gastroesophageal reflux disease), Hypercholesterolemia, Osteopenia, and Pre-diabetes.     has a past surgical history that includes Hysterectomy; Tonsillectomy; eye surgery; LASIK (10/23/2012); Upper gastrointestinal endoscopy (05/03/2013); Colonoscopy (1993); Colonoscopy (05/03/2013); Colonoscopy (02/2017); Upper gastrointestinal endoscopy (02/2017); Cholecystectomy, laparoscopic (N/A, 03/17/2017); Gallbladder surgery; Upper gastrointestinal endoscopy (N/A, 7/18/2022); Upper gastrointestinal endoscopy (7/18/2022); and hip surgery (Left, 6/22/2025).     reports that she has never smoked. She has never used smokeless tobacco. She reports that she does not drink alcohol and does not use drugs.    family history includes Heart Disease in her brother, father, and mother.      REVIEW OF SYSTEMS:   CONSTITUTIONAL: negative for fevers, chills, diaphoresis, activity change, appetite change, fatigue, night sweats and unexpected weight change.   EYES: negative for blurred

## 2025-06-24 NOTE — DISCHARGE INSTR - COC
Continuity of Care Form    Patient Name: Lexi Benitez   :  1945  MRN:  [id number]    Admit date:  2025  Discharge date:  25    Code Status Order: Full Code   Advance Directives:     Admitting Physician:  Khoa Carrasco MD  PCP: Geoffrey Caceres DO    Discharging Nurse: nicole whittaker  Discharging Hospital Unit/Room#: 0531/0531-01  Discharging Unit Phone Number: [phone]    Emergency Contact:   Extended Emergency Contact Information  Primary Emergency Contact: EmmanuelSalvador  Address: [address] [place] [address]  Home Phone: [phone]  Relation: Spouse  Secondary Emergency Contact: Karli Bennett  Mobile Phone: [phone]  Relation: Child    Past Surgical History:  Past Surgical History:   Procedure Laterality Date    CHOLECYSTECTOMY, LAPAROSCOPIC N/A 2017    COLONOSCOPY  1993    poor prep    COLONOSCOPY  2013    WNL    COLONOSCOPY  2017    EYE SURGERY      GALLBLADDER SURGERY      HIP SURGERY Left 2025    HIP HEMIARTHROPLASTY performed by Dung Linares MD at [facility] OR    HYSTERECTOMY (CERVIX STATUS UNKNOWN)      LASIK  10/23/2012    TONSILLECTOMY      UPPER GASTROINTESTINAL ENDOSCOPY  2013    WNL    UPPER GASTROINTESTINAL ENDOSCOPY  2017    UPPER GASTROINTESTINAL ENDOSCOPY N/A 2022    EGD BIOPSY performed by Cody Killian MD at [facility] ENDOSCOPY    UPPER GASTROINTESTINAL ENDOSCOPY  2022    EGD ESOPHAGOGASTRODUODENOSCOPY DILATATION performed by Cody Killian MD at [facility] ENDOSCOPY       Immunization History:   Immunization History   Administered Date(s) Administered    COVID-19, PFIZER Bivalent, DO NOT Dilute, (age 12y+), IM, 30 mcg/0.3 mL 10/03/2022    COVID-19, PFIZER PURPLE top, DILUTE for use, (age 12 y+), 30mcg/0.3mL 2021, 2021, 2021    COVID-19, PFIZER, , (age 12y+), IM, 30mcg/0.3mL 2024    Influenza 2012    Influenza Virus Vaccine 2014    Influenza,  Sutures;Staples 06/23/25 0815   Drainage Amount Small (< 25%) 06/24/25 1332   Number of days: 2        Elimination:  Continence:   Bowel: Yes  Bladder: No  Urinary Catheter: None   Colostomy/Ileostomy/Ileal Conduit: No       Date of Last BM: 05/23      Intake/Output Summary (Last 24 hours) at 6/24/2025 1356  Last data filed at 6/24/2025 1010  Gross per 24 hour   Intake --   Output 650 ml   Net -650 ml     I/O last 3 completed shifts:  In: -   Out: 250 [Urine:250]    Safety Concerns:     History of Falls (last 30 days) and At Risk for Falls    Impairments/Disabilities:      None    Nutrition Therapy:  Current Nutrition Therapy:   - Oral Diet:  General    Routes of Feeding: Oral  Liquids: No Restrictions  Daily Fluid Restriction: no  Last Modified Barium Swallow with Video (Video Swallowing Test): not done    Treatments at the Time of Hospital Discharge:   Respiratory Treatments: na  Oxygen Therapy:  is not on home oxygen therapy.  Ventilator:    - No ventilator support    Rehab Therapies: Physical Therapy and Occupational Therapy  Weight Bearing Status/Restrictions: Partial weight bearing (30-50%) only on leg left leg  Other Medical Equipment (for information only, NOT a DME order):  walker  Other Treatments: na    Patient's personal belongings (please select all that are sent with patient):  Glasses    RN SIGNATURE:  Electronically signed by Jani Simpson RN on 6/25/25 at 12:33 PM EDT    CASE MANAGEMENT/SOCIAL WORK SECTION    Inpatient Status Date: 6/21/25    Readmission Risk Assessment Score:  [facility] RISK OF UNPLANNED READMISSION 2.0             11.1 Total Score        Discharging to Facility/ Agency   Name: [facility]  Services: Inpatient Rehabilitation  [address] [place] [address]  [phone]     / signature: Electronically signed by Ayla Jimenez RN on 6/25/25 at 11:00 AM EDT    PHYSICIAN SECTION    Prognosis: Good    Condition at Discharge: Stable    Rehab

## 2025-06-24 NOTE — TELEPHONE ENCOUNTER
[facility] for patient to call back. Main department number was given.    ----- Message from Rhoda MARTINEZ sent at 6/24/2025 11:06 AM EDT -----  Regarding: Specialist Appointment Request - Established  Specialist Appointment Request - Established    What Specialty? Select Speciality: Orthopedics     Type of Appointment: Appointment Type: Post-Op    Reason for appointment? To see if he can see her in the hospital at [place]     Scheduling Preference per Patient would like to see if he can see her at [place] Location    Additional Information: patient is calling to see if Dr. Linares could see her in the hospital for her 2 weeks post op visit since she is still going to be in the hospital at [place] doing her Physical Therapy. She just need a call back. She had sx on Lt Hip done on 06/22/25 with Dr. Lianres. Please Advise.  --------------------------------------------------------------------------------------------------------------------------    Relationship to Patient: Self  Call Back Information: OK to leave message on voicemail  Preferred Call Back Number:  [phone]

## 2025-06-24 NOTE — CARE COORDINATION
Patient plans to go to [facility] [facility] when medically ready. N/V and pain issues today.     Ayla Jimenez RN

## 2025-06-24 NOTE — PLAN OF CARE
Problem: Discharge Planning  Goal: Discharge to home or other facility with appropriate resources  6/23/2025 2156 by Nancy Frias RN  Outcome: Progressing  6/23/2025 1017 by Annabelle Joseph RN  Outcome: Progressing     Problem: Pain  Goal: Verbalizes/displays adequate comfort level or baseline comfort level  6/23/2025 2156 by Nancy Frias RN  Outcome: Progressing  6/23/2025 1017 by Annabelle Joseph RN  Outcome: Progressing  Flowsheets (Taken 6/23/2025 1017)  Verbalizes/displays adequate comfort level or baseline comfort level:   Encourage patient to monitor pain and request assistance   Assess pain using appropriate pain scale   Administer analgesics based on type and severity of pain and evaluate response   Implement non-pharmacological measures as appropriate and evaluate response     Problem: Safety - Adult  Goal: Free from fall injury  6/23/2025 2156 by Nancy Frias RN  Outcome: Progressing  6/23/2025 1017 by Annabelle Joseph RN  Outcome: Progressing  Flowsheets (Taken 6/23/2025 1017)  Free From Fall Injury: Instruct family/caregiver on patient safety     Problem: Skin/Tissue Integrity  Goal: Skin integrity remains intact  Description: 1.  Monitor for areas of redness and/or skin breakdown  2.  Assess vascular access sites hourly  3.  Every 4-6 hours minimum:  Change oxygen saturation probe site  4.  Every 4-6 hours:  If on nasal continuous positive airway pressure, respiratory therapy assess nares and determine need for appliance change or resting period  6/23/2025 2156 by Nancy Frias RN  Outcome: Progressing  Flowsheets (Taken 6/23/2025 2155)  Skin Integrity Remains Intact: Monitor for areas of redness and/or skin breakdown  6/23/2025 1017 by Annabelle Joseph RN  Outcome: Progressing

## 2025-06-24 NOTE — TELEPHONE ENCOUNTER
General Question     Subject: Patient scheduled for 7/14 for post op, patient was wanting to know if Dr. Linares does post ops at the hospital since she is in-patient for rehab.   Patient and /or Facility Request: Lexi   Contact Number: [phone]

## 2025-06-24 NOTE — PROGRESS NOTES
Occupational Therapy  Facility/Department: [facility] - MED SURG/ORTHO  Daily Treatment Note  NAME: Lexi Benitez  : 1945  MRN: [id number]    Date of Service: 2025    Discharge Recommendations:  IP Rehab  OT Equipment Recommendations  Equipment Needed: Yes  Mobility Devices: Walker;ADL Assistive Devices  Walker: Rolling  ADL Assistive Devices: Toileting - 3-in-1 Commode;Shower Chair with back;Grab Bars - shower;Reacher;Sock-Aid Hard;Long-handled Shoe Horn;Long-handled Sponge;Walker Basket  Other: for adherence to posterior hip precautions and fall prevention.      Patient Diagnosis(es): The primary encounter diagnosis was Closed fracture of left hip, initial encounter ([facility]). Diagnoses of Pneumonia of left lung due to infectious organism, unspecified part of lung and Fall, initial encounter were also pertinent to this visit.     Assessment   Assessment: Patient tolerated OT session well. Patient progressing in areas of functional transfers, LB dressing, footwear dressing, toileting compared to previous OT session. Patient able to recall 2/3 posterior hip precautions without cues. Patient requiring Mod A to Min A simple transfers using RW. Patient able to demonstrate carry over of AE training for LB dressing from yesterday and progress to use of reacher to doff socks today with Min A overall using AE for LB/footwear dressing. At baseline patient is independent with all I/ADLs and ambulates without device. Barriers to return home include fall risk, family unable to provide physical assistance, limited standing tolerance/balance, posterior hip precautions. Patient remains below baseline level of occupational performance. Recommend continued OT services to address performance component deficits in order to maximize safety and independence with ADLs and functional transfers. Discharge recomemndations remains ARU for continued therapy services.  Activity Tolerance: Patient tolerated treatment well;Patient limited

## 2025-06-24 NOTE — PROGRESS NOTES
Hospital Medicine Progress Note  V 5.17      Date of Admission: 6/21/2025    Hospital Day: 4      Chief Admission Complaint:  s/p fall and had left hip fracture.       Subjective:  She is sitting up in bed states she has pain in her left hip but getting better controlled.  Having nausea today.  Denies chest pain or shortness of breath.     at the bedside discussed the case with him    Presenting Admission History:       79 y.o. female with PMHx of HLD, osteoporoisis, fibromyalgia, GERD, depression presented to ER after having fall. Pt was sweeping her driveway when she lost her balance and fell landing on her left side. Denied any head trauma. She was unable to bear weight and had significant pain. She was brought to the ED.  In the ED, she was hypertensive. Imaging revealed a left hip fracture. Labs were notable for hypokalemia. Her EKG was NSR and CXR showed no acute findings.   She was admitted for further evaluation and management      Assessment/Plan:      S/P Fall with resultant left hip fracture:  X-ray revealed angulated left subcapital femoral neck fracture.  Orthopedic surgery consulted and appreciate input.  She did go to the OR on 6/22/2025 with orthopedics for open treatment left displaced femoral neck fracture with prosthetic replacement.  She is weightbearing as tolerated with posterior hip precautions the left hip for 3 months.  Continue analgesics.  PT/OT consulted     GERD : Currently stable continue on Protonix     Hypokalemia: Repleted and will follow.     HyperLipidemia - normally controlled on home Statin. Continued.  Follow up w/ PCP outpatient for medication initiation and/or adjustment as needed.        Acute cystitis: Has been on IV antibiotics with ceftriaxone and now completed course of treatment           Ongoing threat to life and/or bodily function without ongoing treatment due to:    Left hip fracture  Need for surgical treatment  Need for PT/OT      Consults:      IP

## 2025-06-24 NOTE — PROGRESS NOTES
Physical Therapy  Facility/Department: [facility] C5 - MED SURG/ORTHO  Daily Treatment Note  NAME: Lexi Benitez  : 1945  MRN: [id number]    Date of Service: 2025    Discharge Recommendations:  Patient able to tolerate 3 hours of therapy per day, IP Rehab   PT Equipment Recommendations  Equipment Needed: No  Other: defer    Patient Diagnosis(es): The primary encounter diagnosis was Closed fracture of left hip, initial encounter ([facility]). Diagnoses of Pneumonia of left lung due to infectious organism, unspecified part of lung and Fall, initial encounter were also pertinent to this visit.    Assessment  Assessment: Pt demos min A bed mobility and transfers and CGA  with RW for 45 ft. Pt requires increased time for mobility due to pain.  Educated posterior precautions and pt able to teach back. Pt would continue to benefit from skilled PT to aid in the above deficits and a safe DC IPR when medically appropriate.    Pt seen as co-eval with OT in order to safely assess highest level of mobility and increase functional outcomes.  Activity Tolerance: Patient limited by pain;Patient tolerated treatment well;Patient limited by fatigue  Equipment Needed: No  Other: defer    Plan  Physical Therapy Plan  General Plan: 1 time a day 7 days a week  Current Treatment Recommendations: Strengthening;ROM;Balance training;Functional mobility training;Transfer training;Endurance training;Gait training;Home exercise program;Safety education & training;Equipment evaluation, education, & procurement;Therapeutic activities;Stair training    Restrictions  Restrictions/Precautions  Restrictions/Precautions: Weight Bearing, ROM Restrictions, Fall Risk  Activity Level: Up as Tolerated  Lower Extremity Weight Bearing Restrictions  Right Lower Extremity Weight Bearing: Weight Bearing As Tolerated  Left Lower Extremity Weight Bearing: Weight Bearing As Tolerated  Position Activity Restriction  Hip Precautions: No hip flexion > 90 degrees, No  ADduction, No hip internal rotation, Posterior hip precautions  Other Position/Activity Restrictions: WBAT LLE, posterior hip precautions LLE, up as tolerated, abductor pillow AAT while in bed, telemetry, IV     Subjective   Subjective  Subjective: Pt agrees to PT session  Pain: A sorness in leg unrated    Objective  Vitals  Vitals:    06/24/25 0908   BP: 110/63   Pulse: 85   Resp: 18   Temp:    SpO2:    95% O2     Bed Mobility Training  Bed Mobility Training: Yes  Interventions: Safety awareness training;Verbal cues  Supine to Sit: Minimal assistance (LLE)  Scooting: Contact guard assistance (EOB)  Balance  Sitting: Intact  Standing: Impaired  Standing - Static: Constant support;Good  Standing - Dynamic: Constant support;Fair  Transfer Training  Transfer Training: Yes (rw)  Interventions: Safety awareness training;Weight shifting training/pressure relief;Verbal cues  Sit to Stand: Minimal assistance  Stand to Sit: Minimal assistance  Gait Training  Left Side Weight Bearing: As tolerated  Gait  Gait Training: Yes  Left Side Weight Bearing: As tolerated  Overall Level of Assistance: Contact guard assistance  Distance (ft): 18 Feet (45ft)  Assistive Device: Gait belt;Walker, rolling  Interventions: Safety awareness training;Verbal cues  Speed/Xiomara: Slow  Step Length: Right shortened;Left shortened  Gait Abnormalities: Step to gait;Antalgic     PT Exercises  Exercise Treatment: PERFORMED  BLE TE 12-15X EACH: AP, QS, GS, LAQ, HS       Safety Devices  Type of Devices: Call light within reach;Chair alarm in place;Gait belt;Left in chair;Nurse notified;Patient at risk for falls         Goals  Short Term Goals  Time Frame for Short Term Goals: 6/30  -6/24 continue  Short Term Goal 1: pt will perform STS with RW and CGA  Short Term Goal 2: pt will transfer bed to chair with RW and CGA  Short Term Goal 3: pt will ambulate 30 ft with RW and CGA -6/24 met  Short Term Goal 4: pt will participate in BLE exercises x10 INDEP by

## 2025-06-25 ENCOUNTER — HOSPITAL ENCOUNTER (INPATIENT)
Age: 80
LOS: 10 days | Discharge: HOME | DRG: 561 | End: 2025-07-05
Attending: STUDENT IN AN ORGANIZED HEALTH CARE EDUCATION/TRAINING PROGRAM | Admitting: STUDENT IN AN ORGANIZED HEALTH CARE EDUCATION/TRAINING PROGRAM
Payer: MEDICARE

## 2025-06-25 VITALS
TEMPERATURE: 98.2 F | WEIGHT: 139 LBS | SYSTOLIC BLOOD PRESSURE: 125 MMHG | DIASTOLIC BLOOD PRESSURE: 64 MMHG | RESPIRATION RATE: 17 BRPM | OXYGEN SATURATION: 97 % | HEART RATE: 86 BPM | BODY MASS INDEX: 21.82 KG/M2 | HEIGHT: 67 IN

## 2025-06-25 DIAGNOSIS — S72.002A CLOSED LEFT HIP FRACTURE, INITIAL ENCOUNTER (HCC): Primary | ICD-10-CM

## 2025-06-25 PROCEDURE — 1280000000 HC REHAB R&B

## 2025-06-25 PROCEDURE — 97110 THERAPEUTIC EXERCISES: CPT

## 2025-06-25 PROCEDURE — 6360000002 HC RX W HCPCS

## 2025-06-25 PROCEDURE — 6370000000 HC RX 637 (ALT 250 FOR IP): Performed by: INTERNAL MEDICINE

## 2025-06-25 PROCEDURE — 99024 POSTOP FOLLOW-UP VISIT: CPT | Performed by: PHYSICIAN ASSISTANT

## 2025-06-25 PROCEDURE — 97530 THERAPEUTIC ACTIVITIES: CPT

## 2025-06-25 PROCEDURE — 6360000002 HC RX W HCPCS: Performed by: ORTHOPAEDIC SURGERY

## 2025-06-25 PROCEDURE — 6370000000 HC RX 637 (ALT 250 FOR IP): Performed by: STUDENT IN AN ORGANIZED HEALTH CARE EDUCATION/TRAINING PROGRAM

## 2025-06-25 PROCEDURE — 97116 GAIT TRAINING THERAPY: CPT

## 2025-06-25 PROCEDURE — 2500000003 HC RX 250 WO HCPCS: Performed by: ORTHOPAEDIC SURGERY

## 2025-06-25 PROCEDURE — 6370000000 HC RX 637 (ALT 250 FOR IP): Performed by: ORTHOPAEDIC SURGERY

## 2025-06-25 RX ORDER — ONDANSETRON 4 MG/1
4 TABLET, ORALLY DISINTEGRATING ORAL EVERY 8 HOURS PRN
Status: DISCONTINUED | OUTPATIENT
Start: 2025-06-25 | End: 2025-07-05 | Stop reason: HOSPADM

## 2025-06-25 RX ORDER — PANTOPRAZOLE SODIUM 40 MG/1
40 TABLET, DELAYED RELEASE ORAL
Qty: 30 TABLET | Refills: 3 | Status: ON HOLD | OUTPATIENT
Start: 2025-06-25

## 2025-06-25 RX ORDER — ACETAMINOPHEN 650 MG/1
650 SUPPOSITORY RECTAL EVERY 6 HOURS PRN
Status: DISCONTINUED | OUTPATIENT
Start: 2025-06-25 | End: 2025-07-05 | Stop reason: HOSPADM

## 2025-06-25 RX ORDER — BISACODYL 10 MG
10 SUPPOSITORY, RECTAL RECTAL DAILY PRN
Status: DISCONTINUED | OUTPATIENT
Start: 2025-06-25 | End: 2025-07-05 | Stop reason: HOSPADM

## 2025-06-25 RX ORDER — ATORVASTATIN CALCIUM 40 MG/1
40 TABLET, FILM COATED ORAL NIGHTLY
Status: DISCONTINUED | OUTPATIENT
Start: 2025-06-25 | End: 2025-07-05 | Stop reason: HOSPADM

## 2025-06-25 RX ORDER — POLYETHYLENE GLYCOL 3350 17 G/17G
17 POWDER, FOR SOLUTION ORAL DAILY
Status: DISCONTINUED | OUTPATIENT
Start: 2025-06-26 | End: 2025-07-05 | Stop reason: HOSPADM

## 2025-06-25 RX ORDER — SODIUM CHLORIDE 9 MG/ML
INJECTION, SOLUTION INTRAVENOUS PRN
Status: DISCONTINUED | OUTPATIENT
Start: 2025-06-25 | End: 2025-07-05 | Stop reason: HOSPADM

## 2025-06-25 RX ORDER — OXYCODONE AND ACETAMINOPHEN 5; 325 MG/1; MG/1
1 TABLET ORAL EVERY 6 HOURS PRN
Refills: 0 | Status: DISCONTINUED | OUTPATIENT
Start: 2025-06-25 | End: 2025-06-26

## 2025-06-25 RX ORDER — POLYETHYLENE GLYCOL 3350 17 G/17G
17 POWDER, FOR SOLUTION ORAL DAILY PRN
Qty: 2 PACKET | Refills: 0 | Status: ON HOLD | OUTPATIENT
Start: 2025-06-25 | End: 2025-06-27

## 2025-06-25 RX ORDER — ENOXAPARIN SODIUM 100 MG/ML
40 INJECTION SUBCUTANEOUS DAILY
Status: DISCONTINUED | OUTPATIENT
Start: 2025-06-26 | End: 2025-07-05 | Stop reason: HOSPADM

## 2025-06-25 RX ORDER — SODIUM CHLORIDE 0.9 % (FLUSH) 0.9 %
5-40 SYRINGE (ML) INJECTION EVERY 12 HOURS SCHEDULED
Status: DISCONTINUED | OUTPATIENT
Start: 2025-06-25 | End: 2025-07-05 | Stop reason: HOSPADM

## 2025-06-25 RX ORDER — OXYCODONE AND ACETAMINOPHEN 5; 325 MG/1; MG/1
1 TABLET ORAL EVERY 4 HOURS PRN
Qty: 3 TABLET | Refills: 0 | Status: ON HOLD | OUTPATIENT
Start: 2025-06-25 | End: 2025-06-28

## 2025-06-25 RX ORDER — ACETAMINOPHEN 325 MG/1
650 TABLET ORAL EVERY 6 HOURS PRN
Status: DISCONTINUED | OUTPATIENT
Start: 2025-06-25 | End: 2025-07-05 | Stop reason: HOSPADM

## 2025-06-25 RX ORDER — SODIUM CHLORIDE 0.9 % (FLUSH) 0.9 %
5-40 SYRINGE (ML) INJECTION PRN
Status: DISCONTINUED | OUTPATIENT
Start: 2025-06-25 | End: 2025-07-05 | Stop reason: HOSPADM

## 2025-06-25 RX ORDER — POLYETHYLENE GLYCOL 3350 17 G/17G
17 POWDER, FOR SOLUTION ORAL DAILY PRN
Status: DISCONTINUED | OUTPATIENT
Start: 2025-06-25 | End: 2025-07-05 | Stop reason: HOSPADM

## 2025-06-25 RX ORDER — OXYCODONE AND ACETAMINOPHEN 5; 325 MG/1; MG/1
2 TABLET ORAL EVERY 6 HOURS PRN
Refills: 0 | Status: DISCONTINUED | OUTPATIENT
Start: 2025-06-25 | End: 2025-06-26

## 2025-06-25 RX ORDER — ENOXAPARIN SODIUM 100 MG/ML
40 INJECTION SUBCUTANEOUS DAILY
Qty: 16 ML | Refills: 0 | Status: ON HOLD | OUTPATIENT
Start: 2025-06-26 | End: 2025-08-05

## 2025-06-25 RX ORDER — PANTOPRAZOLE SODIUM 40 MG/1
40 TABLET, DELAYED RELEASE ORAL
Status: DISCONTINUED | OUTPATIENT
Start: 2025-06-26 | End: 2025-07-05 | Stop reason: HOSPADM

## 2025-06-25 RX ADMIN — POLYETHYLENE GLYCOL 3350 17 G: 17 POWDER, FOR SOLUTION ORAL at 08:58

## 2025-06-25 RX ADMIN — PANTOPRAZOLE SODIUM 40 MG: 40 TABLET, DELAYED RELEASE ORAL at 15:54

## 2025-06-25 RX ADMIN — HYDROMORPHONE HYDROCHLORIDE 0.5 MG: 1 INJECTION, SOLUTION INTRAMUSCULAR; INTRAVENOUS; SUBCUTANEOUS at 13:39

## 2025-06-25 RX ADMIN — PANTOPRAZOLE SODIUM 40 MG: 40 TABLET, DELAYED RELEASE ORAL at 05:15

## 2025-06-25 RX ADMIN — ATORVASTATIN CALCIUM 40 MG: 40 TABLET, FILM COATED ORAL at 21:04

## 2025-06-25 RX ADMIN — ENOXAPARIN SODIUM 40 MG: 100 INJECTION SUBCUTANEOUS at 08:57

## 2025-06-25 RX ADMIN — OXYCODONE AND ACETAMINOPHEN 2 TABLET: 5; 325 TABLET ORAL at 18:40

## 2025-06-25 RX ADMIN — Medication 10 ML: at 08:58

## 2025-06-25 RX ADMIN — ACETAMINOPHEN 650 MG: 325 TABLET ORAL at 15:54

## 2025-06-25 RX ADMIN — OXYCODONE AND ACETAMINOPHEN 2 TABLET: 5; 325 TABLET ORAL at 08:58

## 2025-06-25 RX ADMIN — ONDANSETRON 4 MG: 4 TABLET, ORALLY DISINTEGRATING ORAL at 08:58

## 2025-06-25 ASSESSMENT — PAIN DESCRIPTION - ORIENTATION
ORIENTATION: LEFT

## 2025-06-25 ASSESSMENT — PAIN SCALES - GENERAL
PAINLEVEL_OUTOF10: 7
PAINLEVEL_OUTOF10: 2
PAINLEVEL_OUTOF10: 2
PAINLEVEL_OUTOF10: 0
PAINLEVEL_OUTOF10: 3
PAINLEVEL_OUTOF10: 9
PAINLEVEL_OUTOF10: 5
PAINLEVEL_OUTOF10: 6
PAINLEVEL_OUTOF10: 8

## 2025-06-25 ASSESSMENT — PAIN DESCRIPTION - DESCRIPTORS
DESCRIPTORS: ACHING
DESCRIPTORS: BURNING
DESCRIPTORS: ACHING

## 2025-06-25 ASSESSMENT — PAIN DESCRIPTION - PAIN TYPE
TYPE: SURGICAL PAIN
TYPE: CHRONIC PAIN;ACUTE PAIN;SURGICAL PAIN

## 2025-06-25 ASSESSMENT — PAIN SCALES - WONG BAKER
WONGBAKER_NUMERICALRESPONSE: HURTS A LITTLE BIT
WONGBAKER_NUMERICALRESPONSE: NO HURT
WONGBAKER_NUMERICALRESPONSE: HURTS A LITTLE BIT

## 2025-06-25 ASSESSMENT — PAIN DESCRIPTION - LOCATION
LOCATION: HIP

## 2025-06-25 ASSESSMENT — PAIN - FUNCTIONAL ASSESSMENT
PAIN_FUNCTIONAL_ASSESSMENT: ACTIVITIES ARE NOT PREVENTED
PAIN_FUNCTIONAL_ASSESSMENT: ACTIVITIES ARE NOT PREVENTED

## 2025-06-25 NOTE — PROGRESS NOTES
Postop Note    Patient:  Lexi Benitez  YOB: 1945     79 y.o. female      Subjective:  Post op day 3 left hip hemiarthroplasty   Patient seen and examined in the hospital. Pain controlled. Plan to DC to ARU today no family at bedside. She notes that she is doing well and overall pain is controlled and that she is tired today.       Objective:   Vitals:    06/25/25 0928   BP:    Pulse:    Resp: 17   Temp:    SpO2:      Gen: NAD, awake   MSK:  -Dressing in place, clean/dry/intact  -Toes warm/well-perfused  Grossly NVI   -Thigh/leg compartments soft/compressible, able to wiggle toes and plantarflex/dorsiflex ankle appropriately       Impression/plan:   79 y.o. female 3 Days Post-Op, doing well overall        -Posterior hip precautions:  avoid flexion and internal rotation of hip, do not cross leg over other, avoid bending at the waist past 90 degrees. Abduction pillow at all times when in bed.     -WB'ing status: WBAT  -Pain control  -20 tabs Percocet  -DVT ppx  -Early mobilization  -Lovenox 40 mg subQ q Day x 6 weeks  -okay to begin postop day 1 from an orthopedic perspective  -Dispo  -per PT recommendations, but ok to discharge from an Ortho perspective when pain controlled  -  -f/u in office in 2 weeks with Dr. Linares      Walk in

## 2025-06-25 NOTE — DISCHARGE SUMMARY
Hospital Medicine Discharge Summary    Patient: Lexi Benitez   : 1945     Hospital:  [facility]  Admit Date: 2025   Discharge Date:   2025  Disposition:  Acute Rehab   Code status:  Full  Condition at Discharge: Stable  Primary Care Provider: Geoffrey Caceres DO    Admitting Provider: Khoa Carrasco MD  Discharge Provider: NATI UPTON MD     Discharge Diagnoses:      Active Hospital Problems    Diagnosis     Fall at home, initial encounter [W19.XXXA, Y92.009]        Presenting Admission History:      79 y.o. female with PMHx of HLD, osteoporoisis, fibromyalgia, GERD, depression presented to ER after having fall. Pt was sweeping her driveway when she lost her balance and fell landing on her left side. Denied any head trauma. She was unable to bear weight and had significant pain. She was brought to the ED.  In the ED, she was hypertensive. Imaging revealed a left hip fracture. Labs were notable for hypokalemia. Her EKG was NSR and CXR showed no acute findings.   She was admitted for further evaluation and management        Assessment/Plan:      S/P Fall with resultant left hip fracture:  X-ray revealed angulated left subcapital femoral neck fracture.  Orthopedic surgery consulted and appreciate input.  She did go to the OR on 2025 with orthopedics for open treatment left displaced femoral neck fracture with prosthetic replacement.  She is weightbearing as tolerated left lower extremity  with posterior hip precautions to  the left hip for 3 months.  Continue analgesics.  PT/OT consulted     GERD : Currently stable continue on Protonix     Hypokalemia: Repleted and we did follow.     HyperLipidemia - normally controlled on home Statin. Continued.  Follow up w/ PCP outpatient for medication initiation and/or adjustment as needed.        Acute cystitis: Has been on IV antibiotics with ceftriaxone and has now completed course of treatment and antibitics dc'd.    DVT  packet Take 1 packet by mouth daily as needed for Constipation  Qty: 2 packet, Refills: 0      pantoprazole (PROTONIX) 40 MG tablet Take 1 tablet by mouth 2 times daily (before meals)  Qty: 30 tablet, Refills: 3           Current Discharge Medication List        Current Discharge Medication List        CONTINUE these medications which have NOT CHANGED    Details   vitamin B-12 (CYANOCOBALAMIN) 500 MCG tablet Take 1 tablet by mouth daily      ondansetron (ZOFRAN) 4 MG tablet Take 1 tablet by mouth every 8 hours as needed for Nausea or Vomiting      Cholecalciferol (VITAMIN D) 2000 UNITS CAPS capsule Take 1 capsule by mouth Daily      atorvastatin (LIPITOR) 40 MG tablet Take 1 tablet by mouth nightly  Qty: 30 tablet, Refills: 0      Denosumab (PROLIA SC) Inject into the skin every 6 months           Current Discharge Medication List        STOP taking these medications       cetirizine (ZYRTEC) 10 MG tablet Comments:   Reason for Stopping:         predniSONE (DELTASONE) 20 MG tablet Comments:   Reason for Stopping:         ketorolac (TORADOL) 10 MG tablet Comments:   Reason for Stopping:         celecoxib (CELEBREX) 200 MG capsule Comments:   Reason for Stopping:         calcium carbonate 600 MG TABS tablet Comments:   Reason for Stopping:         esomeprazole Magnesium (NEXIUM) 20 MG PACK Comments:   Reason for Stopping:               Significant Test Results    XR PELVIS (1-2 VIEWS)  Result Date: 6/22/2025  EXAM: XR PELVIS (1-2 VIEWS) INDICATION: eval frx COMPARISON: Radiograph dated 6/21/2025. TECHNIQUE: 2 views of the pelvis FINDINGS: Postoperative changes of left hip hemiarthroplasty. No periprosthetic fracture or lucency. Alignment is maintained. Soft tissue edema and gas is present about the left hip.     Expected postsurgical appearance following left hip hemiarthroplasty. Electronically signed by Ubaldo Goodwin MD    XR FEMUR LEFT (MIN 2 VIEWS)  Result Date: 6/22/2025  History: Evaluate fracture. Left hip

## 2025-06-25 NOTE — PROGRESS NOTES
IRF VITA Admission Assessment  [facility] Acute Re[facility] Unit    Patient:Lexi Benitez     Rehab Dx/Hx: Closed left hip fracture, initial encounter ([facility]) [S72.002A]   :1945  MRN:[id number]  Date of Admit: 2025     Pain Effect on Sleep:  Over the past 5 days, how much of the time has pain made it hard for you to sleep at night?  []  0. Does not apply - I have not had any pain or hurting in the past 5 days  [x]  1. Rarely or not at all  []  2. Occasionally  []  3. Frequently  []  4. Almost constantly  []  8. Unable to answer    Over the past 5 days, how often have you limited your participation in rehabilitation therapy sessions due to pain?  []  0. Does not apply - I have not received rehabilitation therapy in the past 5 days  [x]  1. Rarely or not at all  []  2. Occasionally  []  3. Frequently  []  4. Almost constantly  []  8. Unable to answer    Pain Interference with Day-to-Day Activities:  Over the past 5 days, how often have you limited your day-to-day activities (excluding rehabilitation therapy session)?  [x]  1. Rarely or not at all  []  2. Occasionally  []  3. Frequently  []  4. Almost constantly  []  8. Unable to answer      High-Risk Drug Classes: Use and Indication   Is taking: Check if the pt is taking any medications by pharmacological classification  Indication noted: If column 1 is checked, check if there is an indication noted for all meds in the drug class Is taking  (check all that apply) Indication noted (check all that apply)   Antipsychotic [] []   Anticoagulant [x] [x]   Antibiotic [] []   Opioid [x] [x]   Antiplatelet [] []   Hypoglycemic (including insulin) [] []   None of the above [] []     Special Treatments, Procedures, and Programs   Check all of the following treatments, procedures, and programs that apply on admission.  On admission (check all that apply)   Cancer Treatments    A1. Chemotherapy []   A2. IV []   A3. Oral []   A10. Other []   B1. Radiation []   Respiratory  Therapies    C1. Oxygen Therapy []   C2. Continuous []   C3. Intermittent []   C4. High-concentration []   D1. Suctioning []   D2. Scheduled []   D3. As needed []   E1. Tracheostomy Care []   F1. Invasive Mechanical Ventilator (ventilator or respirator) []   G1. Non-invasive Mechanical Ventilator []   G2. BiPAP []   G3. CPAP []   Other    H1. IV Medications []   H2. Vasoactive medications []   H3. Antibiotics []   H4. Anticoagulation []   H10. Other []   I1. Transfusions []   J1. Dialysis []   J2. Hemodialysis []   J3. Peritoneal dialysis []   O1. IV access []   O2. Peripheral []   O3. Midline []   O4. Central (PICC, tunneled, port) []   None of the above []     Signature:  Xuan Cole RN

## 2025-06-25 NOTE — PROGRESS NOTES
ASSESSMENT: ARU ADMISSION  [facility]    Patient:Lexi Benitez     Rehab Dx/Hx: Closed left hip fracture, initial encounter ([facility]) [S72.002A]   :1945  MRN:[id number]  Date of Admit: 2025  Room #: 0152/0152-01    Subjective:   Patient admitted to room 152 from C5 via wheelchair.   Alert and oriented x4.  Oriented to room and call light system. Oriented to rehab routine and therapy schedules. Informed about care conferences and ordering of meals with PCA.    Drug / Medication Review:   Medications were reviewed by RN at time of admission  [x]  No potential or actual clinically significant medication issues were noted.   []  Potential or actual clinically significant medication issues were found and MD was notified. (Specified below if applicable)   []  Allergy to medication   []  Drug interactions (drug/drug, drug/food, drug/disease interactions)   []  Duplicate drug   []  Omission (drug missing from prescribed regimen)   []  Non adherence   []  Adverse reaction   []  Wrong patient, drug, dose, route, time error   []  Ineffective drug therapy    Section GG: Eating   []  Code 06, Independent: if the patient completes the activity by themself with no assistance from a helper.   []  Code 05, Setup or clean up assist: if the helper sets up or cleans up; patient completes activity   []  Code 04, Supervision or touching assist: If the helper provides verbal cues and/or touching/steadying and/or contact guard assistance as patient completes activity   []  Code 03, Partial/Moderate Assist: If the helper does LESS THAN HALF the effort. [place] lifts, holds, or supports trunk or limbs, but provides less than half the effort.   []  Code 02, Substantial/Maximal Assist: if the helper does MORE THAN HALF the effort. [place] lifts or holds trunk or limbs and provides more than half the effort.  []  Code 01,Dependent: If the helper does ALL of the effort. Patient does none of the effort to complete the  Assessment   The patient is being assessed for: Admission     I agree that 2 RN's have performed a thorough Head to Toe Skin Assessment on the patient. ALL assessment sites listed below have been assessed.       Areas assessed by both nurses:   [x]   Head, Face, and Ears   [x]   Shoulders, Back, and Chest, Abdomen  [x]   Arms, Elbows, and Hands   [x]   Coccyx, Sacrum, and Ischium  [x]   Legs, Feet, and Heel    Pt has L hip incision closed with staples r/t ORIF in 6/22/25     Does the Patient have Skin Breakdown?  No         James Prevention initiated:  No  Wound Care Orders initiated:  Not Applicable      [facility] nurse consulted for Pressure Injury (Stage 3,4, Unstageable, DTI, NWPT, Complex wounds)and New or Established Ostomies:  Not Applicable    Primary Nurse eSignature:  Xuan Cole RN  Co-signer eSignature: Teri Rowley RN

## 2025-06-25 NOTE — PLAN OF CARE
Problem: Discharge Planning  Goal: Discharge to home or other facility with appropriate resources  6/25/2025 0916 by Jani Simpson RN  Outcome: Progressing  6/24/2025 2131 by Thais Jackson RN  Outcome: Progressing     Problem: Pain  Goal: Verbalizes/displays adequate comfort level or baseline comfort level  6/25/2025 0916 by Jani Simpson RN  Outcome: Progressing  Flowsheets (Taken 6/25/2025 0845)  Verbalizes/displays adequate comfort level or baseline comfort level:   Encourage patient to monitor pain and request assistance   Administer analgesics based on type and severity of pain and evaluate response   Implement non-pharmacological measures as appropriate and evaluate response   Consider cultural and social influences on pain and pain management   Assess pain using appropriate pain scale  6/24/2025 2131 by Thais Jackson RN  Outcome: Progressing     Problem: Safety - Adult  Goal: Free from fall injury  6/25/2025 0916 by Jani Simpson RN  Outcome: Progressing  6/24/2025 2131 by Thais Jackson RN  Outcome: Progressing     Problem: Skin/Tissue Integrity  Goal: Skin integrity remains intact  Description: 1.  Monitor for areas of redness and/or skin breakdown  2.  Assess vascular access sites hourly  3.  Every 4-6 hours minimum:  Change oxygen saturation probe site  4.  Every 4-6 hours:  If on nasal continuous positive airway pressure, respiratory therapy assess nares and determine need for appliance change or resting period  6/25/2025 0916 by Jani Simpson RN  Outcome: Progressing  6/24/2025 2131 by Thais Jackson RN  Outcome: Progressing  Flowsheets (Taken 6/24/2025 0800 by Alexa Jensen RN)  Skin Integrity Remains Intact: Monitor for areas of redness and/or skin breakdown

## 2025-06-25 NOTE — DISCHARGE INSTR - COC
Continuity of Care Form    Patient Name: Lexi Benitez   :  1945  MRN:  [id number]    Admit date:  2025  Discharge date:  ***    Code Status Order: Full Code   Advance Directives:     Admitting Physician:  Loida Lea MD  PCP: Geoffrey Caceres DO    Discharging Nurse: ***  Discharging Hospital Unit/Room#: 0152/0152-01  Discharging Unit Phone Number: ***    Emergency Contact:   Extended Emergency Contact Information  Primary Emergency Contact: LaSalvador aguero  Address: [address] [place] [address]  Home Phone: [phone]  Relation: Spouse  Secondary Emergency Contact: Karli Bennett  Mobile Phone: [phone]  Relation: Child    Past Surgical History:  Past Surgical History:   Procedure Laterality Date    CHOLECYSTECTOMY, LAPAROSCOPIC N/A 2017    COLONOSCOPY  1993    poor prep    COLONOSCOPY  2013    WNL    COLONOSCOPY  2017    EYE SURGERY      GALLBLADDER SURGERY      HIP SURGERY Left 2025    HIP HEMIARTHROPLASTY performed by Dung Linares MD at [facility] OR    HYSTERECTOMY (CERVIX STATUS UNKNOWN)      LASIK  10/23/2012    TONSILLECTOMY      UPPER GASTROINTESTINAL ENDOSCOPY  2013    WNL    UPPER GASTROINTESTINAL ENDOSCOPY  2017    UPPER GASTROINTESTINAL ENDOSCOPY N/A 2022    EGD BIOPSY performed by Cody Killian MD at [facility] ENDOSCOPY    UPPER GASTROINTESTINAL ENDOSCOPY  2022    EGD ESOPHAGOGASTRODUODENOSCOPY DILATATION performed by Cody Killian MD at [facility] ENDOSCOPY       Immunization History:   Immunization History   Administered Date(s) Administered    COVID-19, PFIZER Bivalent, DO NOT Dilute, (age 12y+), IM, 30 mcg/0.3 mL 10/03/2022    COVID-19, PFIZER PURPLE top, DILUTE for use, (age 12 y+), 30mcg/0.3mL 2021, 2021, 2021    COVID-19, PFIZER, , (age 12y+), IM, 30mcg/0.3mL 2024    Influenza 2012    Influenza Virus Vaccine 2014    Influenza, FLUZONE High Dose,

## 2025-06-25 NOTE — PROGRESS NOTES
Report called to ARU all questions and concerns addressed patient and family aware of the transfer

## 2025-06-25 NOTE — PROGRESS NOTES
Postop Note    Patient:  Lexi Benitez  YOB: 1945     79 y.o. female      Subjective:  Patient seen and examined in the hospital. Pain controlled.     She denies any significant pain in her hip with ambulating, but reports that she is still nervous to put weight on her lower extremity through the hip, when working with PT.    Objective:   Vitals:    06/25/25 1337   BP: 115/62   Pulse: 97   Resp: 23   Temp:    SpO2:      Gen: NAD, awake   MSK:  -Dressing in place, clean/dry/intact  -Toes warm/well-perfused  Grossly NVI   -Thigh/leg compartments soft/compressible, able to wiggle toes and plantarflex/dorsiflex ankle appropriately       Impression/plan:   79 y.o. female 3 Days Post-Op, doing well overall        -Posterior hip precautions:  avoid flexion and internal rotation of hip, do not cross leg over other, avoid bending at the waist past 90 degrees. Abduction pillow at all times when in bed.     -WB'ing status: WBAT  -Pain control  -20 tabs Percocet  -DVT ppx  -Early mobilization  -Lovenox 40 mg subQ q Day x 6 weeks  -okay to begin postop day 1 from an orthopedic perspective  -Dispo  -per PT recommendations, but ok to discharge from an Ortho perspective when pain controlled  -  -f/u in office in 2 weeks

## 2025-06-25 NOTE — DISCHARGE INSTRUCTIONS
Total Hip & Bipolar Replacement  Discharge Instructions    To prevent Clot formation, you have been placed on the following medication:  Lovenox 40mg SQ daily for 6 weeks   Surgical Site Care:  Change dressing every 7 days as needed for discharge or drainage. With mepalex dressing   Physical Therapy:  Weight Bearing Status:     Weight bearing as tolerated  Precautions  Per Physical Therapy handout, posterior hip precautions.   Pain Medications  You were given oxycodone/acetaminophen (Percocet)  Wean off pain medications as you deem appropriate as long as pain is under control  Be sure to drink plenty of fluids (recommend water) while taking narcotic pain medications to prevent constipation  You may take an over the counter laxative or stool softener as needed to prevent/treat constipation as well, we recommend Senokot S OTC.  We recommend that you consider taking these medications the entire time you are taking pain medication.  Cold packs/Ice packs/Machine  May be used as much as necessary to reduce swelling/inflammation/soreness  Be sure to have a barrier (cloth, clothing, towel) between your skin/incision and the ice pack to prevent frostbite  Contact office if  Increased redness, swelling, drainage of any kind, and/or pain to surgery site.  As well as new onset fevers and or chills.  These could signify an infection.  Calf or thigh tenderness to touch as well as increased swelling or redness.  This could signify a clot formation.  Numbness or tingling to an area around the incision site or below the incision site (toes).  Any rash appears, increased  or new onset nausea/vomiting occur.  This may indicate a reaction to a medication.   Phone # [phone]  Follow up with Dr. Linares at scheduled appointment time.   Please continue to use your Incentive Spirometer at home every hour while awake.  Patient should have bilateral lower extremity thigh-high BILLIE hose on before they leave the hospital.

## 2025-06-25 NOTE — PROGRESS NOTES
Physical Therapy  Facility/Department: [facility] C5 - MED SURG/ORTHO  Daily Treatment Note  NAME: Lexi Benitez  : 1945  MRN: [id number]    Date of Service: 2025    Discharge Recommendations:  Patient able to tolerate 3 hours of therapy per day, IP Rehab   PT Equipment Recommendations  Equipment Needed: No  Other: defer    Patient Diagnosis(es): The primary encounter diagnosis was Closed fracture of left hip, initial encounter ([facility]). Diagnoses of Pneumonia of left lung due to infectious organism, unspecified part of lung and Fall, initial encounter were also pertinent to this visit.    Assessment  Assessment: Pt demos CGA/min A bed mobility and transfers and CGA  with RW for 40 ft. Pt requires increased time for mobility due to pain.  Pt recalled posterior precautions. Pt would continue to benefit from skilled PT to aid in the above deficits and a safe DC IPR when medically appropriate.    Pt seen as co-eval with OT in order to safely assess highest level of mobility and increase functional outcomes.  Activity Tolerance: Patient tolerated treatment well;Patient limited by endurance  Equipment Needed: No  Other: defer    Plan  Physical Therapy Plan  General Plan: 1 time a day 7 days a week  Current Treatment Recommendations: Strengthening;ROM;Balance training;Functional mobility training;Transfer training;Endurance training;Gait training;Home exercise program;Safety education & training;Equipment evaluation, education, & procurement;Therapeutic activities;Stair training    Restrictions  Restrictions/Precautions  Restrictions/Precautions: Weight Bearing, ROM Restrictions, Fall Risk  Activity Level: Up as Tolerated  Lower Extremity Weight Bearing Restrictions  Right Lower Extremity Weight Bearing: Weight Bearing As Tolerated  Left Lower Extremity Weight Bearing: Weight Bearing As Tolerated  Position Activity Restriction  Hip Precautions: No hip flexion > 90 degrees, No ADduction, No hip internal rotation,

## 2025-06-25 NOTE — PLAN OF CARE
Problem: Discharge Planning  Goal: Discharge to home or other facility with appropriate resources  Outcome: Progressing     Problem: Pain  Goal: Verbalizes/displays adequate comfort level or baseline comfort level  Outcome: Progressing     Problem: Safety - Adult  Goal: Free from fall injury  Outcome: Progressing     Problem: Skin/Tissue Integrity  Goal: Skin integrity remains intact  Description: 1.  Monitor for areas of redness and/or skin breakdown  2.  Assess vascular access sites hourly  3.  Every 4-6 hours minimum:  Change oxygen saturation probe site  4.  Every 4-6 hours:  If on nasal continuous positive airway pressure, respiratory therapy assess nares and determine need for appliance change or resting period  Outcome: Progressing  Flowsheets (Taken 6/24/2025 0800 by Alexa Jensen, RN)  Skin Integrity Remains Intact: Monitor for areas of redness and/or skin breakdown

## 2025-06-25 NOTE — CARE COORDINATION
CASE MANAGEMENT DISCHARGE SUMMARY      Discharge to: [facility]    Precertification completed: N/A  Hospital Exemption Notification (HENS) completed: N/A    IMM given: (date) 6/24/25    New Durable Medical Equipment ordered/agency: deferred    Transportation: bed      time: 1700      Confirmed discharge plan with:     Patient: yes     Family:  yes         Facility/Agency, name: [facility]    Phone number for report to facility: [phone]     RN, name: Jani    Note: Discharging nurse to complete VIK, reconcile AVS, and place final copy with patient's discharge packet. RN to ensure that written prescriptions for  Level II medications are sent with patient to the facility as per protocol.      Ayla Jimenez RN

## 2025-06-25 NOTE — H&P
Patient: Lexi Benitez  [id number]  Date: 6/26/2025      Chief Complaint: left hip pain    History of Present Illness/Hospital Course:  Lexi Benitez is a 79 year old female with a past medical history significant for fibromyalgia, HLD, anxiety, pre-diabetes, and GERD who presented to [facility] on 6/21/25 with left hip pain after a fall, found to have left hip fracture. Orthopedic Surgery was consulted and on 6/22 she underwent ORIF with prosthetic replacement. She is WBAT with posterior hip precautions. She was admitted to [facility] on 6/25/25 due to functional deficits below her baseline. Today Lexi is seen in her room without family present. She reports left hip pain, but reports that pain medications are helping. She reports chronic constipation and small hard stools for which she has been evaluated at the [facility]. She notes being independent at baseline. She lives with her  in a single level house with a threshold to enter. She is an active . She is retired from working in [employer].     Prior Level of Function:  Independent for self care, indoor mobility, stairs, functional cognition     Current Level of Function:  Supervision to walk 10 feet  Mod assist for toileting hygiene, lower body dressing, putting on/taking off footwear, sit to stand, toilet transfer     has a past medical history of Anxiety, Dyslipidemia, Fibromyalgia, GERD (gastroesophageal reflux disease), Hypercholesterolemia, Osteopenia, and Pre-diabetes.     has a past surgical history that includes Hysterectomy; Tonsillectomy; eye surgery; LASIK (10/23/2012); Upper gastrointestinal endoscopy (05/03/2013); Colonoscopy (1993); Colonoscopy (05/03/2013); Colonoscopy (02/2017); Upper gastrointestinal endoscopy (02/2017); Cholecystectomy, laparoscopic (N/A, 03/17/2017); Gallbladder surgery; Upper gastrointestinal endoscopy (N/A, 7/18/2022); Upper gastrointestinal endoscopy (7/18/2022); and hip surgery (Left, 6/22/2025).     reports that she

## 2025-06-25 NOTE — PROGRESS NOTES
Patient: Lexi Benitez  [id number]  Date: 6/25/2025      Chief Complaint: left hip pain    History of Present Illness/Hospital Course:  Lexi Benitez is a 79 year old female with a past medical history significant for fibromyalgia, HLD, anxiety, pre-diabetes, and GERD who presented to [facility] on 6/21/25 with left hip pain after a fall, found to have left hip fracture. Orthopedic Surgery was consulted and on 6/22 she underwent ORIF with prosthetic replacement. She is WBAT with posterior hip precautions. She continues to have functional deficits below her baseline.She notes being independent at baseline. She lives with her  in a single level house with a threshold to enter. She is an active . She is retired from working in [employer]. She is motivated to work with therapies and is interested in ARU.     Interval History:  No acute events overnight. Today Lexi is seen with her  and daughter present. She reports feeling well. Discussed plan to admit to rehab today.      has a past medical history of Anxiety, Dyslipidemia, Fibromyalgia, GERD (gastroesophageal reflux disease), Hypercholesterolemia, Osteopenia, and Pre-diabetes.     has a past surgical history that includes Hysterectomy; Tonsillectomy; eye surgery; LASIK (10/23/2012); Upper gastrointestinal endoscopy (05/03/2013); Colonoscopy (1993); Colonoscopy (05/03/2013); Colonoscopy (02/2017); Upper gastrointestinal endoscopy (02/2017); Cholecystectomy, laparoscopic (N/A, 03/17/2017); Gallbladder surgery; Upper gastrointestinal endoscopy (N/A, 7/18/2022); Upper gastrointestinal endoscopy (7/18/2022); and hip surgery (Left, 6/22/2025).     reports that she has never smoked. She has never used smokeless tobacco. She reports that she does not drink alcohol and does not use drugs.    family history includes Heart Disease in her brother, father, and mother.      REVIEW OF SYSTEMS:   Denies f/c, n/v, cp, sob    Physical Examination:  Vitals: Patient Vitals

## 2025-06-25 NOTE — PLAN OF CARE
ARU PATIENT TREATMENT PLAN  [facility]  [address] [place]  [address]  [phone]    Lexi Benitez    : 1945  [facility] #: [id number]  MRN: [id number]   PHYSICIAN:  Loida Lea MD  Primary Problem    Patient Active Problem List   Diagnosis    GERD (gastroesophageal reflux disease)    Fibromyalgia    Anxiety    Insomnia    Osteopenia    Impaired fasting glucose    Vitamin D deficiency    Hypercholesterolemia    Family history of thyroid disease    Pneumonia    Lumbar facet arthropathy    Calculus of gallbladder with acute on chronic cholecystitis without obstruction    Family history of liver cancer    Family history of Graves' disease    SOB (shortness of breath)    Chest pain    Fall at home, initial encounter    Closed left hip fracture, initial encounter ([facility])       Rehabilitation Diagnosis:     Closed left hip fracture, initial encounter ([facility]) [S72.002A]       ADMIT DATE:2025    Patient Goals: \"to get home...and to be ok once I get there\"     Admitting Impairments: Pt admitted to A on  with left hip pan after a fall, found to have a left hip fracture. Pt was admitted to [facility] on  d/t functional deficits below her baseline. Decreased functional mobility ; Decreased endurance; Decreased ADL status; Decreased balance; Decreased ROM; Decreased strength; Decreased high-level IADLs; Decreased posture; Decreased safe awareness    Barriers: level of assistance, availability of assistance, endurance, pain, comorbidities   Participation: Prognosis: Good      CARE PLAN     NURSING:  eLxi Benitez while on this unit will:     [x] Be continent of bowel and bladder     [x] Have an adequate number of bowel movements  [x] Urinate with no urinary retention >300ml in bladder  [] Complete bladder protocol with turner removal  [x] Maintain O2 SATs at _92__%  [x] Have pain managed while on ARU       [x] Be pain free by discharge   [x] Have no skin breakdown while on  exercises, gait training, neuromuscular re-ed, transfer training, community reintegration, bed mobility, w/c mobility and training, self care, home mgmt, cognitive training, energy conservation,dysphagia tx, speech/language/communication therapy, group therapy, and patient/family education. In addition, dietician/nutritionist may monitor calorie count as well as intake and collaboratively work with SLP on dietary upgrades.  Neuropsychology/Psychology may evaluate and provide necessary support.    Medical issues being managed closely and that require 24 hour availability of a physician:   [] Swallowing Precautions  [x] Bowel/Bladder Fx  [] Weight bearing precautions   [] Wound Care    [x] Pain Mgmt   [x] Infection Protection   [x] DVT Prophylaxis   [x] Fall Precautions  [x] Fluid/Electrolyte/Nutrition Balance   [] Voice Protection   [] Respiratory  [] Other:    Medical Prognosis: [x] Good  [] Fair    [] Guarded   Total expected IRF days 11  Anticipated discharge destination:    [] Home Independently   [] Home Modified Independent  [x] Home with supervision    []SNF     [] Other                                           Physician anticipated functional outcomes:  Recommending home with assist PRN and ongoing therapy services  IPOC brief synthesis: Lexi Benitez is a 79 year old female with a past medical history significant for fibromyalgia, HLD, anxiety, pre-diabetes, and GERD who presented to [facility] on 6/21/25 with left hip pain after a fall, found to have left hip fracture s/p ORIF with prosthetic replacement. She was admitted to [facility] on 6/25/25 due to functional deficits below her baseline.     This plan has been reviewed with Lexi Benitez on 06/27/25 in a language the patient understands.  Lexi Benitez has had the opportunity to include input with the therapy team.      I have reviewed this initial plan of care and agree with its contents:    Title   Name    Date    Time    Physician: Loida MENDIOLA

## 2025-06-26 LAB
ANION GAP SERPL CALCULATED.3IONS-SCNC: 11 MMOL/L (ref 3–16)
BASOPHILS # BLD: 0.1 K/UL (ref 0–0.2)
BASOPHILS NFR BLD: 1 %
BUN SERPL-MCNC: 10 MG/DL (ref 7–20)
CALCIUM SERPL-MCNC: 8.6 MG/DL (ref 8.3–10.6)
CHLORIDE SERPL-SCNC: 104 MMOL/L (ref 99–110)
CO2 SERPL-SCNC: 25 MMOL/L (ref 21–32)
CREAT SERPL-MCNC: 0.6 MG/DL (ref 0.6–1.2)
DEPRECATED RDW RBC AUTO: 14 % (ref 12.4–15.4)
EOSINOPHIL # BLD: 0.3 K/UL (ref 0–0.6)
EOSINOPHIL NFR BLD: 4.5 %
GFR SERPLBLD CREATININE-BSD FMLA CKD-EPI: >90 ML/MIN/{1.73_M2}
GLUCOSE SERPL-MCNC: 107 MG/DL (ref 70–99)
HCT VFR BLD AUTO: 31.6 % (ref 36–48)
HGB BLD-MCNC: 10.9 G/DL (ref 12–16)
LYMPHOCYTES # BLD: 1.7 K/UL (ref 1–5.1)
LYMPHOCYTES NFR BLD: 29.6 %
MCH RBC QN AUTO: 30.8 PG (ref 26–34)
MCHC RBC AUTO-ENTMCNC: 34.6 G/DL (ref 31–36)
MCV RBC AUTO: 89.2 FL (ref 80–100)
MONOCYTES # BLD: 0.5 K/UL (ref 0–1.3)
MONOCYTES NFR BLD: 7.9 %
NEUTROPHILS # BLD: 3.4 K/UL (ref 1.7–7.7)
NEUTROPHILS NFR BLD: 57 %
PLATELET # BLD AUTO: 212 K/UL (ref 135–450)
PMV BLD AUTO: 6.9 FL (ref 5–10.5)
POTASSIUM SERPL-SCNC: 4.2 MMOL/L (ref 3.5–5.1)
RBC # BLD AUTO: 3.55 M/UL (ref 4–5.2)
SODIUM SERPL-SCNC: 140 MMOL/L (ref 136–145)
WBC # BLD AUTO: 5.9 K/UL (ref 4–11)

## 2025-06-26 PROCEDURE — 97110 THERAPEUTIC EXERCISES: CPT

## 2025-06-26 PROCEDURE — 97530 THERAPEUTIC ACTIVITIES: CPT

## 2025-06-26 PROCEDURE — 97162 PT EVAL MOD COMPLEX 30 MIN: CPT

## 2025-06-26 PROCEDURE — 36415 COLL VENOUS BLD VENIPUNCTURE: CPT

## 2025-06-26 PROCEDURE — 1280000000 HC REHAB R&B

## 2025-06-26 PROCEDURE — 97535 SELF CARE MNGMENT TRAINING: CPT

## 2025-06-26 PROCEDURE — 6370000000 HC RX 637 (ALT 250 FOR IP): Performed by: STUDENT IN AN ORGANIZED HEALTH CARE EDUCATION/TRAINING PROGRAM

## 2025-06-26 PROCEDURE — 80048 BASIC METABOLIC PNL TOTAL CA: CPT

## 2025-06-26 PROCEDURE — 97167 OT EVAL HIGH COMPLEX 60 MIN: CPT

## 2025-06-26 PROCEDURE — 6360000002 HC RX W HCPCS: Performed by: STUDENT IN AN ORGANIZED HEALTH CARE EDUCATION/TRAINING PROGRAM

## 2025-06-26 PROCEDURE — 85025 COMPLETE CBC W/AUTO DIFF WBC: CPT

## 2025-06-26 RX ORDER — OXYCODONE AND ACETAMINOPHEN 5; 325 MG/1; MG/1
2 TABLET ORAL EVERY 4 HOURS PRN
Refills: 0 | Status: DISCONTINUED | OUTPATIENT
Start: 2025-06-26 | End: 2025-07-05 | Stop reason: HOSPADM

## 2025-06-26 RX ORDER — OXYCODONE AND ACETAMINOPHEN 5; 325 MG/1; MG/1
1 TABLET ORAL EVERY 4 HOURS PRN
Refills: 0 | Status: DISCONTINUED | OUTPATIENT
Start: 2025-06-26 | End: 2025-07-05 | Stop reason: HOSPADM

## 2025-06-26 RX ORDER — MULTIVITAMIN WITH IRON
500 TABLET ORAL DAILY
Status: DISCONTINUED | OUTPATIENT
Start: 2025-06-27 | End: 2025-07-05 | Stop reason: HOSPADM

## 2025-06-26 RX ORDER — MECOBALAMIN 5000 MCG
5 TABLET,DISINTEGRATING ORAL NIGHTLY PRN
Status: DISCONTINUED | OUTPATIENT
Start: 2025-06-26 | End: 2025-07-05 | Stop reason: HOSPADM

## 2025-06-26 RX ORDER — VITAMIN B COMPLEX
2000 TABLET ORAL DAILY
Status: DISCONTINUED | OUTPATIENT
Start: 2025-06-27 | End: 2025-07-05 | Stop reason: HOSPADM

## 2025-06-26 RX ADMIN — OXYCODONE AND ACETAMINOPHEN 2 TABLET: 5; 325 TABLET ORAL at 19:43

## 2025-06-26 RX ADMIN — OXYCODONE AND ACETAMINOPHEN 2 TABLET: 5; 325 TABLET ORAL at 10:30

## 2025-06-26 RX ADMIN — ATORVASTATIN CALCIUM 40 MG: 40 TABLET, FILM COATED ORAL at 19:43

## 2025-06-26 RX ADMIN — PANTOPRAZOLE SODIUM 40 MG: 40 TABLET, DELAYED RELEASE ORAL at 06:46

## 2025-06-26 RX ADMIN — ONDANSETRON 4 MG: 4 TABLET, ORALLY DISINTEGRATING ORAL at 08:50

## 2025-06-26 RX ADMIN — ENOXAPARIN SODIUM 40 MG: 100 INJECTION SUBCUTANEOUS at 09:16

## 2025-06-26 RX ADMIN — OXYCODONE AND ACETAMINOPHEN 2 TABLET: 5; 325 TABLET ORAL at 06:46

## 2025-06-26 ASSESSMENT — PAIN DESCRIPTION - LOCATION
LOCATION: HIP
LOCATION: HIP
LOCATION: HIP;LEG
LOCATION: HIP

## 2025-06-26 ASSESSMENT — PAIN SCALES - GENERAL
PAINLEVEL_OUTOF10: 6
PAINLEVEL_OUTOF10: 7
PAINLEVEL_OUTOF10: 8
PAINLEVEL_OUTOF10: 7
PAINLEVEL_OUTOF10: 8
PAINLEVEL_OUTOF10: 7
PAINLEVEL_OUTOF10: 7

## 2025-06-26 ASSESSMENT — PAIN DESCRIPTION - DESCRIPTORS
DESCRIPTORS: ACHING
DESCRIPTORS: ACHING;BURNING;DISCOMFORT
DESCRIPTORS: ACHING;DISCOMFORT;SORE

## 2025-06-26 ASSESSMENT — PAIN DESCRIPTION - PAIN TYPE: TYPE: ACUTE PAIN;SURGICAL PAIN

## 2025-06-26 ASSESSMENT — PAIN DESCRIPTION - ONSET: ONSET: ON-GOING

## 2025-06-26 ASSESSMENT — PAIN DESCRIPTION - ORIENTATION
ORIENTATION: LEFT

## 2025-06-26 ASSESSMENT — PAIN DESCRIPTION - FREQUENCY: FREQUENCY: CONTINUOUS

## 2025-06-26 ASSESSMENT — PAIN - FUNCTIONAL ASSESSMENT
PAIN_FUNCTIONAL_ASSESSMENT: PREVENTS OR INTERFERES SOME ACTIVE ACTIVITIES AND ADLS
PAIN_FUNCTIONAL_ASSESSMENT: PREVENTS OR INTERFERES SOME ACTIVE ACTIVITIES AND ADLS

## 2025-06-26 NOTE — PLAN OF CARE
Problem: Pain  Goal: Verbalizes/displays adequate comfort level or baseline comfort level  6/26/2025 1304 by Clara Harris RN  Outcome: Progressing     Problem: Safety - Adult  Goal: Free from fall injury  6/26/2025 1304 by Clara Harris RN  Outcome: Progressing     Problem: ABCDS Injury Assessment  Goal: Absence of physical injury  Outcome: Progressing     Problem: Nutrition Deficit:  Goal: Optimize nutritional status  Outcome: Not Progressing     Problem: Nutrition Deficit:  Goal: Optimize nutritional status  Outcome: Not Progressing

## 2025-06-26 NOTE — PROGRESS NOTES
Comprehensive Nutrition Assessment    Type and Reason for Visit:  Initial, Consult    Nutrition Recommendations/Plan:   Continue general diet   Encourage PO intakes as tolerated   Monitor nutrition adequacy, pertinent labs, bowel habits, wt changes, and clinical progress     Malnutrition Assessment:  Malnutrition Status:  At risk for malnutrition (06/26/25 1245)    Context:  Acute Illness     Findings of the 6 clinical characteristics of malnutrition:  Energy Intake:  Mild decrease in energy intake (no intakes recorded, difficult to assess PO intakes)  Weight Loss:  Unable to assess (SUSIE stated weight hx, pt denies weight loss this stay)     Body Fat Loss:  Unable to assess     Muscle Mass Loss:  Mild muscle mass loss Clavicles (pectoralis & deltoids)  Fluid Accumulation:  Unable to assess     Strength:  Not Performed    Nutrition Assessment:    New ARU pt admitted s/p fall with hip fracture. S/p ORIF w/ prostetic replacement on 6/22. Hx of HLD, GERD and pre-diabetes. Consulted for ONS. Pt nutritionally at risk AEB decreased appetite and PO intakes. Pt reports appetite decreased since surgery. C/o nausea that may be r/t pain medications. C/o constipation, refused bowel regimen this AM. Declined all ONS. Will continue to monitor.    Nutrition Related Findings:    Active BS. BM on 6/22. Labs reviewed. Wound Type: Surgical Incision       Current Nutrition Intake & Therapies:    Average Meal Intake: Unable to assess  Average Supplements Intake: None Ordered  ADULT DIET; Regular    Anthropometric Measures:  Height: 170.2 cm (5' 7\")  Ideal Body Weight (IBW): 135 lbs (61 kg)       Current Body Weight: 62.6 kg (138 lb), 102.2 % IBW. Weight Source: Standing scale  Current BMI (kg/m2): 21.6  Usual Body Weight:  (stated weight hx)                          BMI Categories: Underweight (BMI less than 22) age over 65    Estimated Daily Nutrient Needs:  Energy Requirements Based On: Kcal/kg (25-30)  Weight Used for Energy

## 2025-06-26 NOTE — PROGRESS NOTES
Occupational Therapy  Facility/Department: [facility]  Rehabilitation Occupational Therapy Evaluation/Treatment       Date: 25  Patient Name: Lexi Benitez       Room: 0152/0152-01  MRN: [id number]  Account: [id number]   : 1945  (79 y.o.) Gender: female     Referring Practitioner: Loida Lea MD  Diagnosis: Closed Left hip Fracture  Additional Pertinent Hx: fibromyalgia, anxiety, GERD, osteoporosis, Depression    Restrictions  Restrictions/Precautions: Weight Bearing;ROM Restrictions;Fall Risk;General Precautions  Hip Precautions: No hip flexion > 90 degrees;No ADduction;No hip internal rotation;Posterior hip precautions  Other Position/Activity Restrictions: WBAT LLE, posterior hip precautions LLE, up as tolerated, abductor pillow AAT while in bed  Left Lower Extremity Weight Bearing: Weight Bearing As Tolerated    Subjective  Subjective: Pt in w/c, pleasant, feeling \"weak\", pain 5/10 in L hip, aching, able to continue with therapy with repositioning and emotional support, agreeable to OT evaluation.      Pt was bathed during OT session this date. Pt was Showered with soap/water with Moderate Assist.      Vitals  Pulse: 96  Respirations: 16  BP: 116/60  Oxygen Therapy  SpO2: 97 %  O2 Device: None (Room air)       Objective  Vision - Basic Assessment  Prior Vision: Wears glasses all the time  Visual History: No significant visual history  Patient Visual Report: No visual complaint reported.  Visual Field Cut: No  Oculo Motor Control: WNL  Cognition  Overall Cognitive Status: Exceptions  Arousal/Alertness: Appears intact  Following Commands: Appears intact  Attention Span: Appears intact  Memory: Appears intact  Safety Judgement: Decreased awareness of need for assistance  Problem Solving: Assistance required to generate solutions;Assistance required to implement solutions  Insights: Decreased awareness of deficits  Initiation: Appears intact  Sequencing: Appears intact  Orientation  Overall  balance/sit<>stand to manage clothing up/down  Functional Mobility: Contact guard assistance  Functional Mobility Skilled Clinical Factors: with RW to/from bathroom  Product Used : Soap and water    Goals  Patient Goals   Patient goals : \"to get home...and to be ok once I get there\"  Short Term Goals  Time Frame for Short Term Goals: 1 week- 7/1/25  Short Term Goal 1: Pt complete toileting with SBA.  Short Term Goal 2: Pt will perform functional transfers with SBA.  Short Term Goal 3: Pt will complete LB dressing with SBA.  Short Term Goal 4: Pt will perform FB bathing with SBA.  Short Term Goal 5: Pt will complete grooming at sink with SPV.  Long Term Goals  Time Frame for Long Term Goals : 11 days- 7/5/25  Long Term Goal 1: Pt will complete functional transfers with mod I.  Long Term Goal 2: Pt will perform toileting with mod I.  Long Term Goal 3: Pt will complete LB dressing with mod I.  Long Term Goal 4: Pt will complete FB bathing with SPV.  Long Term Goal 5: Pt will complete simple IADL task with SPV.  Education     Education Given To Patient   Education Provided Role of Therapy; Mobility Training; Plan of Care; Precautions; Safety; ADL Function; Equipment; Transfer Training   Education Provided Comments role of OT, IPR requirements, ADL retraining, safety with ADLs, transfer safety,   Education Method Demonstration; Verbal   Barriers to Learning None   Education Outcome Verbalized understanding; Continued education needed     Assessment  Performance deficits / Impairments: Decreased functional mobility ;Decreased endurance;Decreased ADL status;Decreased balance;Decreased ROM;Decreased strength;Decreased high-level IADLs;Decreased posture;Decreased safe awareness  Assessment: Pt presents with the above deficits requiring skilled OT services to return to PLOF. Per MD H&P: \"Lexi Benitez is a 79 year old female with a past medical history significant for fibromyalgia, HLD, anxiety, pre-diabetes, and GERD who

## 2025-06-26 NOTE — PLAN OF CARE
Problem: Pain  Goal: Verbalizes/displays adequate comfort level or baseline comfort level  Outcome: Progressing  Note: Pain assessment completed. Nonpharmacological methods offered prior to and during times of pain. Medicated per orders as necessary.       Problem: Safety - Adult  Goal: Free from fall injury  6/26/2025 0559 by Teri Rowley RN  Outcome: Progressing  Note: Pt. Free from falls or self injury at this time. Falls risk protocol maintained. Call light within reach.

## 2025-06-26 NOTE — PROGRESS NOTES
Physical Therapy  Facility/Department: [facility]  Rehabilitation Physical Therapy Initial Assessment    NAME: Lexi Benitez  : 1945 (79 y.o.)  MRN: [id number]  CODE STATUS: Full Code    Date of Service: 25      Past Medical History:   Diagnosis Date    Anxiety 2013    Dyslipidemia 2013    Fibromyalgia     GERD (gastroesophageal reflux disease)     Hypercholesterolemia 2014    Osteopenia     Pre-diabetes 2013     Past Surgical History:   Procedure Laterality Date    CHOLECYSTECTOMY, LAPAROSCOPIC N/A 2017    COLONOSCOPY  1993    poor prep    COLONOSCOPY  2013    WNL    COLONOSCOPY  2017    EYE SURGERY      GALLBLADDER SURGERY      HIP SURGERY Left 2025    HIP HEMIARTHROPLASTY performed by Dung Linares MD at [facility] OR    HYSTERECTOMY (CERVIX STATUS UNKNOWN)      LASIK  10/23/2012    TONSILLECTOMY      UPPER GASTROINTESTINAL ENDOSCOPY  2013    WNL    UPPER GASTROINTESTINAL ENDOSCOPY  2017    UPPER GASTROINTESTINAL ENDOSCOPY N/A 2022    EGD BIOPSY performed by Cody Killian MD at [facility] ENDOSCOPY    UPPER GASTROINTESTINAL ENDOSCOPY  2022    EGD ESOPHAGOGASTRODUODENOSCOPY DILATATION performed by Cody Killian MD at [facility] ENDOSCOPY       Chart Reviewed: Yes  Patient assessed for rehabilitation services?: Yes  Family/Caregiver Present: No  Referring Practitioner: Loida Lea MD  Referral Date : 25  Diagnosis: fall at home  General  General Comments: RN cleared pt to participate    Restrictions:  Restrictions/Precautions: Weight Bearing;ROM Restrictions;Fall Risk;General Precautions  Lower Extremity Weight Bearing Restrictions  Right Lower Extremity Weight Bearing: Weight Bearing As Tolerated  Left Lower Extremity Weight Bearing: Weight Bearing As Tolerated  Position Activity Restriction  Hip Precautions: No hip flexion > 90 degrees;No ADduction;No hip internal rotation;Posterior hip precautions  Other Position/Activity  Rolling    CLINICAL IMPRESSION   pt is 79 year old female admitted to ARU with dx of L hip replacement 6/22 2* fall resulting in femoral neck fx. prior to admission, pt reports being ind with no AD at baseline living with  in 1 level apartment with level entry. pt currently requires min a for bed mobility, min A for functional transfers without AD, TD for gait 8 ft without AD (CGA wtih RW x 90 ft) and supv wc mobility. pt able to state hip precautions however requries occasional with functional movements to maintain. anticipate pt to d/c home with prn assist and OPPT (if transportation available). DME: RW    GOALS  Patient Goals   Patient Goals : \"to get home...and to be ok once I get there\"  Short Term Goals  Time Frame for Short Term Goals: 7 days 7/1  Short Term Goal 1: pt will complete bed mobility with supv  Short Term Goal 2: pt will complete functional transfers wtih supv and LRAD  Short Term Goal 3: pt will ambulate 150 ft with LRAD and SBa  Short Term Goal 4: pt will navigate curb step with LRAD and SBA  Long Term Goals  Time Frame for Long Term Goals : 11 days 7/5  Long Term Goal 1: pt will complete bed mobility with ind.  Long Term Goal 2: pt will complete functional transfer with mod ind and LRAD  Long Term Goal 3: pt will ambulate 150 ft with LRAD and mod ind  Long Term Goal 4: pt will navigate curb step with LRAD and mod ind  Long Term Goal 5: pt will complete car transfer with mod ind and LRAD    PLAN OF CARE  Frequency: 1-2 treatment sessions per day, 5-7 days per week  Physical Therapy Plan  General Plan: 5-7 times per week  Current Treatment Recommendations: Strengthening;ROM;Balance training;Functional mobility training;Transfer training;Endurance training;Gait training;Home exercise program;Safety education & training;Equipment evaluation, education, & procurement;Therapeutic activities;Stair training;Wheelchair mobility training;Positioning;Neuromuscular re-education;Patient/Caregiver

## 2025-06-26 NOTE — CARE COORDINATION
Case Management ARU Admission Assessment   [facility]    Patient:Lexi Benitez     Rehab Dx/Hx: Closed left hip fracture, initial encounter ([facility]) [S72.002A]   :1945  MRN:[id number]  Date of Admit: 2025  Room #: 0152/0152-01       Objective:  met with the patient to complete initial assessment and review the role of Case Management while on the ARU. Patient educated on team conferences. Discussed family training with the patient/family on how it is encouraged on the unit. Order for \"discharge planning\" has been addressed.          Family Present: No   Primary : Spouse   Health Care Decision Maker:   Primary Decision Maker (Active): EmmanuelSalvador - Spouse - [phone]    Secondary Decision Maker: Karli Bennett - Child - [phone]    Supplemental (Other) Decision Maker: Marielena Hudson - Child - [phone]   Current PCP:         Admit date:  2025   Insurance: [facility]   Precert required for SNF:  [x] No       [] Yes   3 Night stay required: [] No       [x] Yes   Admitting diagnosis: Closed left hip fracture, initial encounter ([facility]) [S72.002A]       Current home situation: In apt w spouse   DME at home: [x] Walker     [] Cane       [] RTS        [] BSC      [] Shower Chair        [] O2       [] HHN     [] CPAP       [] BiPap      [] Hospital Bed       [] W/C        [] Other:    Medical concerns requiring training: None    Medication concerns:  Require financial assistance with meds? [x] No       [] If yes, please explain:   [] Yes              Services prior to admission: None   Preference for HHC or OP Therapy: [] Home health       [] Outpatient       [x] No preference   Patient's goal(s): Walk and be independent   Working or volunteering PTA? No       Transportation needs:    Has lack of transportation kept you from medical appointments, meetings, work, or ADL's? [] Yes, it has kept me from medical appointments or from getting my meds  [] Yes, It has kept me from

## 2025-06-27 ENCOUNTER — TELEPHONE (OUTPATIENT)
Dept: ORTHOPEDIC SURGERY | Age: 80
End: 2025-06-27

## 2025-06-27 LAB
ANION GAP SERPL CALCULATED.3IONS-SCNC: 13 MMOL/L (ref 3–16)
BASOPHILS # BLD: 0.1 K/UL (ref 0–0.2)
BASOPHILS NFR BLD: 1.1 %
BUN SERPL-MCNC: 14 MG/DL (ref 7–20)
CALCIUM SERPL-MCNC: 8.8 MG/DL (ref 8.3–10.6)
CHLORIDE SERPL-SCNC: 100 MMOL/L (ref 99–110)
CO2 SERPL-SCNC: 23 MMOL/L (ref 21–32)
CREAT SERPL-MCNC: 0.6 MG/DL (ref 0.6–1.2)
DEPRECATED RDW RBC AUTO: 13.7 % (ref 12.4–15.4)
EOSINOPHIL # BLD: 0.1 K/UL (ref 0–0.6)
EOSINOPHIL NFR BLD: 1.4 %
GFR SERPLBLD CREATININE-BSD FMLA CKD-EPI: >90 ML/MIN/{1.73_M2}
GLUCOSE SERPL-MCNC: 112 MG/DL (ref 70–99)
HCT VFR BLD AUTO: 32.2 % (ref 36–48)
HGB BLD-MCNC: 11 G/DL (ref 12–16)
LYMPHOCYTES # BLD: 1.2 K/UL (ref 1–5.1)
LYMPHOCYTES NFR BLD: 19.4 %
MCH RBC QN AUTO: 30.2 PG (ref 26–34)
MCHC RBC AUTO-ENTMCNC: 34.2 G/DL (ref 31–36)
MCV RBC AUTO: 88.5 FL (ref 80–100)
MONOCYTES # BLD: 0.5 K/UL (ref 0–1.3)
MONOCYTES NFR BLD: 7.3 %
NEUTROPHILS # BLD: 4.5 K/UL (ref 1.7–7.7)
NEUTROPHILS NFR BLD: 70.8 %
PLATELET # BLD AUTO: 245 K/UL (ref 135–450)
PMV BLD AUTO: 6.7 FL (ref 5–10.5)
POTASSIUM SERPL-SCNC: 3.8 MMOL/L (ref 3.5–5.1)
RBC # BLD AUTO: 3.63 M/UL (ref 4–5.2)
SODIUM SERPL-SCNC: 136 MMOL/L (ref 136–145)
WBC # BLD AUTO: 6.4 K/UL (ref 4–11)

## 2025-06-27 PROCEDURE — 36415 COLL VENOUS BLD VENIPUNCTURE: CPT

## 2025-06-27 PROCEDURE — 97530 THERAPEUTIC ACTIVITIES: CPT

## 2025-06-27 PROCEDURE — 1280000000 HC REHAB R&B

## 2025-06-27 PROCEDURE — 97116 GAIT TRAINING THERAPY: CPT

## 2025-06-27 PROCEDURE — 80048 BASIC METABOLIC PNL TOTAL CA: CPT

## 2025-06-27 PROCEDURE — 97535 SELF CARE MNGMENT TRAINING: CPT

## 2025-06-27 PROCEDURE — 6370000000 HC RX 637 (ALT 250 FOR IP): Performed by: STUDENT IN AN ORGANIZED HEALTH CARE EDUCATION/TRAINING PROGRAM

## 2025-06-27 PROCEDURE — 97112 NEUROMUSCULAR REEDUCATION: CPT

## 2025-06-27 PROCEDURE — 6360000002 HC RX W HCPCS: Performed by: STUDENT IN AN ORGANIZED HEALTH CARE EDUCATION/TRAINING PROGRAM

## 2025-06-27 PROCEDURE — 85025 COMPLETE CBC W/AUTO DIFF WBC: CPT

## 2025-06-27 RX ADMIN — OXYCODONE AND ACETAMINOPHEN 1 TABLET: 5; 325 TABLET ORAL at 20:18

## 2025-06-27 RX ADMIN — OXYCODONE AND ACETAMINOPHEN 2 TABLET: 5; 325 TABLET ORAL at 15:02

## 2025-06-27 RX ADMIN — ENOXAPARIN SODIUM 40 MG: 100 INJECTION SUBCUTANEOUS at 09:41

## 2025-06-27 RX ADMIN — Medication 2000 UNITS: at 08:02

## 2025-06-27 RX ADMIN — PANTOPRAZOLE SODIUM 40 MG: 40 TABLET, DELAYED RELEASE ORAL at 15:03

## 2025-06-27 RX ADMIN — ATORVASTATIN CALCIUM 40 MG: 40 TABLET, FILM COATED ORAL at 20:18

## 2025-06-27 RX ADMIN — PANTOPRAZOLE SODIUM 40 MG: 40 TABLET, DELAYED RELEASE ORAL at 06:24

## 2025-06-27 RX ADMIN — ONDANSETRON 4 MG: 4 TABLET, ORALLY DISINTEGRATING ORAL at 16:47

## 2025-06-27 RX ADMIN — CYANOCOBALAMIN TAB 500 MCG 500 MCG: 500 TAB at 08:02

## 2025-06-27 RX ADMIN — OXYCODONE AND ACETAMINOPHEN 2 TABLET: 5; 325 TABLET ORAL at 08:02

## 2025-06-27 ASSESSMENT — PAIN DESCRIPTION - ORIENTATION
ORIENTATION: LEFT

## 2025-06-27 ASSESSMENT — PAIN SCALES - GENERAL
PAINLEVEL_OUTOF10: 6
PAINLEVEL_OUTOF10: 5
PAINLEVEL_OUTOF10: 8
PAINLEVEL_OUTOF10: 8
PAINLEVEL_OUTOF10: 5

## 2025-06-27 ASSESSMENT — PAIN DESCRIPTION - DESCRIPTORS
DESCRIPTORS: ACHING
DESCRIPTORS: ACHING
DESCRIPTORS: ACHING;DISCOMFORT;SORE

## 2025-06-27 ASSESSMENT — PAIN DESCRIPTION - PAIN TYPE: TYPE: ACUTE PAIN;SURGICAL PAIN

## 2025-06-27 ASSESSMENT — PAIN DESCRIPTION - LOCATION
LOCATION: HIP;LEG
LOCATION: HIP
LOCATION: HIP;LEG

## 2025-06-27 ASSESSMENT — PAIN DESCRIPTION - FREQUENCY: FREQUENCY: CONTINUOUS

## 2025-06-27 ASSESSMENT — PAIN - FUNCTIONAL ASSESSMENT: PAIN_FUNCTIONAL_ASSESSMENT: PREVENTS OR INTERFERES SOME ACTIVE ACTIVITIES AND ADLS

## 2025-06-27 ASSESSMENT — PAIN DESCRIPTION - ONSET: ONSET: ON-GOING

## 2025-06-27 NOTE — PLAN OF CARE
Problem: Discharge Planning  Goal: Discharge to home or other facility with appropriate resources  Outcome: Progressing     Problem: Pain  Goal: Verbalizes/displays adequate comfort level or baseline comfort level  6/27/2025 0823 by Clara Harris RN  Outcome: Progressing     Problem: Safety - Adult  Goal: Free from fall injury  Outcome: Progressing     Problem: ABCDS Injury Assessment  Goal: Absence of physical injury  Outcome: Progressing

## 2025-06-27 NOTE — PROGRESS NOTES
Physical Therapy  Facility/Department: [facility]  Rehabilitation Physical Therapy Treatment Note    NAME: Lexi Benitez  : 1945 (79 y.o.)  MRN: [id number]  CODE STATUS: Full Code    Date of Service: 25       Restrictions:  Restrictions/Precautions: Weight Bearing, ROM Restrictions, Fall Risk, General Precautions  Lower Extremity Weight Bearing Restrictions  Right Lower Extremity Weight Bearing: Weight Bearing As Tolerated  Left Lower Extremity Weight Bearing: Weight Bearing As Tolerated  Position Activity Restriction  Hip Precautions: No hip flexion > 90 degrees, No ADduction, No hip internal rotation, Posterior hip precautions  Other Position/Activity Restrictions: WBAT LLE, posterior hip precautions LLE, up as tolerated, abductor pillow AAT while in bed     SUBJECTIVE  Subjective: Pt agrees to PT session  Pain: \"No pain it just feels like a piece of cement\"       OBJECTIVE  Cognition  Overall Cognitive Status: Exceptions  Arousal/Alertness: Appears intact  Following Commands: Appears intact  Attention Span: Appears intact  Memory: Appears intact  Safety Judgement: Decreased awareness of need for assistance  Problem Solving: Assistance required to generate solutions;Assistance required to implement solutions  Insights: Decreased awareness of deficits  Initiation: Appears intact  Sequencing: Appears intact  Orientation  Overall Orientation Status: Within Functional Limits  Orientation Level: Oriented X4    Functional Mobility  Bed Mobility  Additional Factors: Head of bed raised;Increased time to complete;Verbal cues (verbal cues for L hip flex < 90 during bed mobility)  Sit to Supine  Assistance Level: Minimal assistance  Skilled Clinical Factors: for LLE management  Supine to Sit  Assistance Level: Contact guard assist  Skilled Clinical Factors: verbal cues for hip flex precautions  Scooting  Assistance Level: Stand by assist  Balance  Sitting Balance: Supervision  Standing Balance: Contact guard

## 2025-06-27 NOTE — PROGRESS NOTES
Lexi Benitez  6/27/2025  [id number]    Chief Complaint: Closed left hip fracture, initial encounter ([facility])    Subjective:   No acute events overnight. Today Lexi is seen in her room eating breakfast. She reports feeling weak and tired today. She denies infectious symptoms. She did have a medium sized bowel movement.     Labs pending.     ROS: denies f/c, n/v, cp, sob    Objective:  Patient Vitals for the past 24 hrs:   BP Temp Temp src Pulse Resp SpO2 Height   06/27/25 0804 (!) 126/59 -- -- 92 -- 98 % --   06/27/25 0745 (!) 126/59 97.8 °F (36.6 °C) Oral 92 16 98 % --   06/26/25 1930 123/66 98.4 °F (36.9 °C) Oral 90 18 99 % --   06/26/25 1235 -- -- -- -- -- -- 1.702 m (5' 7\")   06/26/25 1100 -- -- -- -- 16 -- --   06/26/25 1030 -- -- -- -- 16 -- --     Gen: No distress, pleasant.   HEENT: Normocephalic, atraumatic.   CV: Regular rate and rhythm.   Resp: No respiratory distress. Lungs CTAB  Abd: Soft, nontender   Ext: No edema.   Neuro: Alert, oriented, appropriately interactive.     Wt Readings from Last 3 Encounters:   06/25/25 62.9 kg (138 lb 11.2 oz)   06/21/25 63 kg (139 lb)   12/03/24 64.5 kg (142 lb 3.2 oz)       Laboratory data:   Lab Results   Component Value Date    WBC 6.4 06/27/2025    HGB 11.0 (L) 06/27/2025    HCT 32.2 (L) 06/27/2025    MCV 88.5 06/27/2025     06/27/2025       Lab Results   Component Value Date/Time     06/26/2025 06:23 AM    K 4.2 06/26/2025 06:23 AM     06/26/2025 06:23 AM    CO2 25 06/26/2025 06:23 AM    BUN 10 06/26/2025 06:23 AM    CREATININE 0.6 06/26/2025 06:23 AM    GLUCOSE 107 06/26/2025 06:23 AM    CALCIUM 8.6 06/26/2025 06:23 AM        Therapy progress:  Physical therapy:  Bed Mobility:     Sit>supine:     Supine>sit:     Transfers:  Surface: From chair with arms  Additional Factors: Verbal cues, Hand placement cues, Increased time to complete  Device: Walker  Sit>stand:  Assistance Level: Contact guard assist  Stand>sit:  Assistance Level: Contact guard

## 2025-06-27 NOTE — TELEPHONE ENCOUNTER
LVM for patient to call back due to 1st PO is 3 weeks out and Dr. Linares prefers 2 weeks out. I also suggested we can work together to schedule the remainder of her PO appts.

## 2025-06-27 NOTE — PLAN OF CARE
Problem: Pain  Goal: Verbalizes/displays adequate comfort level or baseline comfort level  6/26/2025 2158 by Danielle Arellano RN  Flowsheets (Taken 6/26/2025 2158)  Verbalizes/displays adequate comfort level or baseline comfort level:   Encourage patient to monitor pain and request assistance   Assess pain using appropriate pain scale   Administer analgesics based on type and severity of pain and evaluate response   Implement non-pharmacological measures as appropriate and evaluate response   Consider cultural and social influences on pain and pain management   Notify Licensed Independent Practitioner if interventions unsuccessful or patient reports new pain

## 2025-06-27 NOTE — PROGRESS NOTES
Occupational Therapy  Facility/Department: [facility]  Rehabilitation Occupational Therapy Daily Treatment Note    Date: 25  Patient Name: Lexi Benitez       Room: 0152/0152-01  MRN: [id number]  Account: [id number]   : 1945  (79 y.o.) Gender: female                    Past Medical History:  has a past medical history of Anxiety, Dyslipidemia, Fibromyalgia, GERD (gastroesophageal reflux disease), Hypercholesterolemia, Osteopenia, and Pre-diabetes.  Past Surgical History:   has a past surgical history that includes Hysterectomy; Tonsillectomy; eye surgery; LASIK (10/23/2012); Upper gastrointestinal endoscopy (2013); Colonoscopy (); Colonoscopy (2013); Colonoscopy (2017); Upper gastrointestinal endoscopy (2017); Cholecystectomy, laparoscopic (N/A, 2017); Gallbladder surgery; Upper gastrointestinal endoscopy (N/A, 2022); Upper gastrointestinal endoscopy (2022); and hip surgery (Left, 2025).    Restrictions  Restrictions/Precautions: Weight Bearing, ROM Restrictions, Fall Risk, General Precautions  Hip Precautions: No hip flexion > 90 degrees, No ADduction, No hip internal rotation, Posterior hip precautions  Other Position/Activity Restrictions: WBAT LLE, posterior hip precautions LLE, up as tolerated, abductor pillow AAT while in bed  Right Lower Extremity Weight Bearing: Weight Bearing As Tolerated  Left Lower Extremity Weight Bearing: Weight Bearing As Tolerated    Subjective  Subjective: Pt in w/c on arrival, pain 8/10 in L hip, aching, able to continue with therapy with repositioning and emotional support, agreeable to OT session, /56, , O2 sats 98% on RA.  Restrictions/Precautions: Weight Bearing;ROM Restrictions;Fall Risk;General Precautions             Objective     Cognition  Overall Cognitive Status: Exceptions  Arousal/Alertness: Appears intact  Following Commands: Appears intact  Attention Span: Appears intact  Memory: Appears  items and tolerated being on her feet for 14 minutes and 35 seconds with supervision. Patient participated in pattern matching activity in static stance to address balance, standing tolerance, and seqeuncing needed to perform self-care activities with increased safety and independence. Patient tolerated being on her feet for 5 minutes and 10 seconds and correctly placed all pieces without cueing. Patient self-propelled wheelchair from therapy gym to her room with supervision. Patient requesting to go to the bathroom upon returning to room. Patient ambulated to the the bathroom with supervision, performed toilet transfer with supervision, and performed clothing management with supervision. Patient left on toilet with RN present. Continue POC.  Activity Tolerance: Patient limited by pain;Patient limited by endurance  Discharge Recommendations: 24 hour supervision or assist;Home with Home health OT;S Level 1  OT Equipment Recommendations  Equipment Needed: Yes  Mobility Devices: ADL Assistive Devices  ADL Assistive Devices: Toileting - Raised Toilet Seat with arms;Shower Chair with back  Safety Devices  Safety Devices in place: Yes  Type of devices: Gait belt;Bed alarm in place;Left in bed;Call light within reach;Nurse notified    Patient Education  Education  Education Given To: Patient  Education Provided: Role of Therapy;Plan of Care;Precautions;Safety;ADL Function;DME/Home Modifications;Equipment;Transfer Training;Mobility Training;IADL Function  Education Provided Comments: role of OT, (I)ADL retraining, transfers training, use of AD, safety with transfers and ADLs  Education Method: Verbal;Demonstration  Barriers to Learning: None  Education Outcome: Verbalized understanding;Demonstrated understanding    Plan  Occupational Therapy Plan  Times Per Week: 5-7x  Current Treatment Recommendations: Balance training;Functional mobility training;Endurance training;Strengthening;Self-Care / ADL;Safety education &

## 2025-06-28 PROCEDURE — 6360000002 HC RX W HCPCS: Performed by: STUDENT IN AN ORGANIZED HEALTH CARE EDUCATION/TRAINING PROGRAM

## 2025-06-28 PROCEDURE — 97535 SELF CARE MNGMENT TRAINING: CPT

## 2025-06-28 PROCEDURE — 6370000000 HC RX 637 (ALT 250 FOR IP): Performed by: STUDENT IN AN ORGANIZED HEALTH CARE EDUCATION/TRAINING PROGRAM

## 2025-06-28 PROCEDURE — 97112 NEUROMUSCULAR REEDUCATION: CPT

## 2025-06-28 PROCEDURE — 97116 GAIT TRAINING THERAPY: CPT

## 2025-06-28 PROCEDURE — 97110 THERAPEUTIC EXERCISES: CPT

## 2025-06-28 PROCEDURE — 97530 THERAPEUTIC ACTIVITIES: CPT

## 2025-06-28 PROCEDURE — 1280000000 HC REHAB R&B

## 2025-06-28 RX ADMIN — OXYCODONE AND ACETAMINOPHEN 1 TABLET: 5; 325 TABLET ORAL at 04:42

## 2025-06-28 RX ADMIN — PANTOPRAZOLE SODIUM 40 MG: 40 TABLET, DELAYED RELEASE ORAL at 04:41

## 2025-06-28 RX ADMIN — OXYCODONE AND ACETAMINOPHEN 2 TABLET: 5; 325 TABLET ORAL at 18:10

## 2025-06-28 RX ADMIN — PANTOPRAZOLE SODIUM 40 MG: 40 TABLET, DELAYED RELEASE ORAL at 16:18

## 2025-06-28 RX ADMIN — OXYCODONE AND ACETAMINOPHEN 1 TABLET: 5; 325 TABLET ORAL at 09:31

## 2025-06-28 RX ADMIN — Medication 2000 UNITS: at 09:31

## 2025-06-28 RX ADMIN — ATORVASTATIN CALCIUM 40 MG: 40 TABLET, FILM COATED ORAL at 20:35

## 2025-06-28 RX ADMIN — CYANOCOBALAMIN TAB 500 MCG 500 MCG: 500 TAB at 09:30

## 2025-06-28 RX ADMIN — ENOXAPARIN SODIUM 40 MG: 100 INJECTION SUBCUTANEOUS at 09:30

## 2025-06-28 ASSESSMENT — PAIN DESCRIPTION - ONSET
ONSET: GRADUAL
ONSET: ON-GOING
ONSET: ON-GOING

## 2025-06-28 ASSESSMENT — PAIN DESCRIPTION - LOCATION
LOCATION: HIP;LEG

## 2025-06-28 ASSESSMENT — PAIN DESCRIPTION - ORIENTATION
ORIENTATION: LEFT

## 2025-06-28 ASSESSMENT — PAIN DESCRIPTION - FREQUENCY
FREQUENCY: CONTINUOUS

## 2025-06-28 ASSESSMENT — PAIN DESCRIPTION - PAIN TYPE
TYPE: ACUTE PAIN;SURGICAL PAIN

## 2025-06-28 ASSESSMENT — PAIN DESCRIPTION - DESCRIPTORS
DESCRIPTORS: ACHING;DISCOMFORT;SORE
DESCRIPTORS: ACHING;DISCOMFORT
DESCRIPTORS: ACHING;DISCOMFORT

## 2025-06-28 ASSESSMENT — PAIN SCALES - GENERAL
PAINLEVEL_OUTOF10: 6
PAINLEVEL_OUTOF10: 6
PAINLEVEL_OUTOF10: 9
PAINLEVEL_OUTOF10: 0
PAINLEVEL_OUTOF10: 5

## 2025-06-28 ASSESSMENT — PAIN - FUNCTIONAL ASSESSMENT
PAIN_FUNCTIONAL_ASSESSMENT: PREVENTS OR INTERFERES SOME ACTIVE ACTIVITIES AND ADLS

## 2025-06-28 NOTE — PLAN OF CARE
Problem: Pain  Goal: Verbalizes/displays adequate comfort level or baseline comfort level  Outcome: Progressing  Flowsheets (Taken 6/27/2025 2248)  Verbalizes/displays adequate comfort level or baseline comfort level:   Encourage patient to monitor pain and request assistance   Assess pain using appropriate pain scale   Administer analgesics based on type and severity of pain and evaluate response   Implement non-pharmacological measures as appropriate and evaluate response   Consider cultural and social influences on pain and pain management   Notify Licensed Independent Practitioner if interventions unsuccessful or patient reports new pain

## 2025-06-28 NOTE — PROGRESS NOTES
Occupational Therapy  Facility/Department: [facility]  Rehabilitation Occupational Therapy Daily Treatment Note    Date: 25  Patient Name: Lexi Benitez       Room: 0152/0152-01  MRN: [id number]  Account: [id number]   : 1945  (79 y.o.) Gender: female         Past Medical History:  has a past medical history of Anxiety, Dyslipidemia, Fibromyalgia, GERD (gastroesophageal reflux disease), Hypercholesterolemia, Osteopenia, and Pre-diabetes.  Past Surgical History:   has a past surgical history that includes Hysterectomy; Tonsillectomy; eye surgery; LASIK (10/23/2012); Upper gastrointestinal endoscopy (2013); Colonoscopy (); Colonoscopy (2013); Colonoscopy (2017); Upper gastrointestinal endoscopy (2017); Cholecystectomy, laparoscopic (N/A, 2017); Gallbladder surgery; Upper gastrointestinal endoscopy (N/A, 2022); Upper gastrointestinal endoscopy (2022); and hip surgery (Left, 2025).    Restrictions  Restrictions/Precautions: Weight Bearing, ROM Restrictions, Fall Risk, General Precautions  Hip Precautions: No hip flexion > 90 degrees, No ADduction, No hip internal rotation, Posterior hip precautions  Other Position/Activity Restrictions: WBAT LLE, posterior hip precautions LLE, up as tolerated, abductor pillow AAT while in bed  Right Lower Extremity Weight Bearing: Weight Bearing As Tolerated  Left Lower Extremity Weight Bearing: Weight Bearing As Tolerated    Subjective  Subjective: Pt in bed seated upright upon arrival, pleasant and agreeable to OT session. BP: 119/55 HR: 96 O2: 97%  Restrictions/Precautions: Weight Bearing;ROM Restrictions;Fall Risk;General Precautions          Objective     Orientation  Overall Orientation Status: Within Functional Limits  Orientation Level: Oriented X4         ADL  Grooming/Oral Hygiene  Assistance Level: Supervision  Skilled Clinical Factors: standing at sink for 3:00 for brushing teeth and washing face  Upper Extremity  procurement;Patient/Caregiver education & training;ROM;Home management training;Pain management    Goals  Patient Goals   Patient goals : \"to get home...and to be ok once I get there\"  Short Term Goals  Time Frame for Short Term Goals: 1 week- 7/1/25  Short Term Goal 1: Pt complete toileting with SBA.  Short Term Goal 2: Pt will perform functional transfers with SBA.  Short Term Goal 3: Pt will complete LB dressing with SBA.  Short Term Goal 4: Pt will perform FB bathing with SBA.  Short Term Goal 5: Pt will complete grooming at sink with SPV. GOAL MET 6/27/25 Pt completed grooming with SPV.  Long Term Goals  Time Frame for Long Term Goals : 11 days- 7/5/25  Long Term Goal 1: Pt will complete functional transfers with mod I.  Long Term Goal 2: Pt will perform toileting with mod I.  Long Term Goal 3: Pt will complete LB dressing with mod I.  Long Term Goal 4: Pt will complete FB bathing with SPV.  Long Term Goal 5: Pt will complete simple IADL task with SPV. GOAL MET 6/27/25 Pt completed simple IADL task with SPV.        Therapy Time   Individual Concurrent Group Co-treatment   Time In 0800         Time Out 0900         Minutes 60         Timed Code Treatment Minutes: 60 Minutes    Second Session Therapy Time:   Individual Concurrent Group Co-treatment   Time In 1100         Time Out 1130         Minutes 30           Timed Code Treatment Minutes:  30 Minutes    Total Treatment Minutes: 90 minutes        JORDAN Muñiz/MONICA

## 2025-06-28 NOTE — PROGRESS NOTES
Physical Therapy  Facility/Department: [facility]  Rehabilitation Physical Therapy Treatment Note    NAME: Lexi Benitez  : 1945 (79 y.o.)  MRN: [id number]  CODE STATUS: Full Code    Date of Service: 25       Restrictions:  Restrictions/Precautions: Weight Bearing, ROM Restrictions, Fall Risk, General Precautions  Lower Extremity Weight Bearing Restrictions  Right Lower Extremity Weight Bearing: Weight Bearing As Tolerated  Left Lower Extremity Weight Bearing: Weight Bearing As Tolerated  Position Activity Restriction  Hip Precautions: No hip flexion > 90 degrees, No ADduction, No hip internal rotation, Posterior hip precautions  Other Position/Activity Restrictions: WBAT LLE, posterior hip precautions LLE, up as tolerated, abductor pillow AAT while in bed     SUBJECTIVE  Subjective: Pt agreeable to work with PT this session, very pleasant and cooperative.  Pain: Pt c/o 6/10 acute/surgical pain in L hip at rest.  Pt states she just received pain medication prior to therapy.    OBJECTIVE  Orientation  Overall Orientation Status: Within Functional Limits  Orientation Level: Oriented X4    Bed Mobility  Sit to Supine  Assistance Level: Stand by assist  Skilled Clinical Factors: Increased time/effort, HOB elevated ~30 degrees  Scooting  Assistance Level: Stand by assist  Skilled Clinical Factors: To scoot up in bed, increased time/effort    Balance  Sitting Balance: Independent  Standing Balance: Stand by assistance (using RW)    Transfers  Additional Factors: Verbal cues  Device: Walker (RW)  Sit to Stand  Assistance Level: Contact guard assist  Stand to Sit  Assistance Level: Stand by assist  Bed To/From Chair  Technique: Stand step  Assistance Level: Contact guard assist;Stand by assist  Skilled Clinical Factors: CGA/close SBA x 1 using RW, moving from chair>bed    Ambulation  Surface: Level surface  Device: Rolling walker  Distance: 150 feet + 15 feet + 150 feet  Activity: Within Room;Within

## 2025-06-29 VITALS
SYSTOLIC BLOOD PRESSURE: 115 MMHG | TEMPERATURE: 98.2 F | DIASTOLIC BLOOD PRESSURE: 55 MMHG | OXYGEN SATURATION: 96 % | HEART RATE: 88 BPM | HEIGHT: 67 IN | RESPIRATION RATE: 16 BRPM | WEIGHT: 138.7 LBS | BODY MASS INDEX: 21.77 KG/M2

## 2025-06-29 PROCEDURE — 6360000002 HC RX W HCPCS: Performed by: STUDENT IN AN ORGANIZED HEALTH CARE EDUCATION/TRAINING PROGRAM

## 2025-06-29 PROCEDURE — 1280000000 HC REHAB R&B

## 2025-06-29 PROCEDURE — 6370000000 HC RX 637 (ALT 250 FOR IP): Performed by: STUDENT IN AN ORGANIZED HEALTH CARE EDUCATION/TRAINING PROGRAM

## 2025-06-29 RX ADMIN — OXYCODONE AND ACETAMINOPHEN 2 TABLET: 5; 325 TABLET ORAL at 20:27

## 2025-06-29 RX ADMIN — PANTOPRAZOLE SODIUM 40 MG: 40 TABLET, DELAYED RELEASE ORAL at 06:25

## 2025-06-29 RX ADMIN — ATORVASTATIN CALCIUM 40 MG: 40 TABLET, FILM COATED ORAL at 20:28

## 2025-06-29 RX ADMIN — CYANOCOBALAMIN TAB 500 MCG 500 MCG: 500 TAB at 09:01

## 2025-06-29 RX ADMIN — Medication 2000 UNITS: at 09:01

## 2025-06-29 RX ADMIN — ENOXAPARIN SODIUM 40 MG: 100 INJECTION SUBCUTANEOUS at 09:01

## 2025-06-29 RX ADMIN — OXYCODONE AND ACETAMINOPHEN 1 TABLET: 5; 325 TABLET ORAL at 12:54

## 2025-06-29 RX ADMIN — PANTOPRAZOLE SODIUM 40 MG: 40 TABLET, DELAYED RELEASE ORAL at 16:08

## 2025-06-29 RX ADMIN — OXYCODONE AND ACETAMINOPHEN 2 TABLET: 5; 325 TABLET ORAL at 06:25

## 2025-06-29 ASSESSMENT — PAIN SCALES - GENERAL
PAINLEVEL_OUTOF10: 0
PAINLEVEL_OUTOF10: 6
PAINLEVEL_OUTOF10: 7
PAINLEVEL_OUTOF10: 0
PAINLEVEL_OUTOF10: 9
PAINLEVEL_OUTOF10: 0

## 2025-06-29 ASSESSMENT — PAIN DESCRIPTION - PAIN TYPE: TYPE: ACUTE PAIN;SURGICAL PAIN

## 2025-06-29 ASSESSMENT — PAIN DESCRIPTION - ORIENTATION
ORIENTATION: LEFT
ORIENTATION: LEFT

## 2025-06-29 ASSESSMENT — PAIN DESCRIPTION - ONSET: ONSET: ON-GOING

## 2025-06-29 ASSESSMENT — PAIN DESCRIPTION - LOCATION
LOCATION: HIP;LEG
LOCATION: LEG;HIP

## 2025-06-29 ASSESSMENT — PAIN DESCRIPTION - DESCRIPTORS: DESCRIPTORS: ACHING;DISCOMFORT

## 2025-06-29 ASSESSMENT — PAIN DESCRIPTION - FREQUENCY: FREQUENCY: CONTINUOUS

## 2025-06-29 ASSESSMENT — PAIN - FUNCTIONAL ASSESSMENT: PAIN_FUNCTIONAL_ASSESSMENT: PREVENTS OR INTERFERES SOME ACTIVE ACTIVITIES AND ADLS

## 2025-06-30 ENCOUNTER — TELEPHONE (OUTPATIENT)
Dept: ORTHOPEDIC SURGERY | Age: 80
End: 2025-06-30

## 2025-06-30 LAB
ANION GAP SERPL CALCULATED.3IONS-SCNC: 10 MMOL/L (ref 3–16)
ANISOCYTOSIS BLD QL SMEAR: ABNORMAL
BASOPHILS # BLD: 0.1 K/UL (ref 0–0.2)
BASOPHILS NFR BLD: 1 %
BUN SERPL-MCNC: 12 MG/DL (ref 7–20)
CALCIUM SERPL-MCNC: 9 MG/DL (ref 8.3–10.6)
CHLORIDE SERPL-SCNC: 103 MMOL/L (ref 99–110)
CO2 SERPL-SCNC: 25 MMOL/L (ref 21–32)
CREAT SERPL-MCNC: 0.6 MG/DL (ref 0.6–1.2)
DEPRECATED RDW RBC AUTO: 14.2 % (ref 12.4–15.4)
EOSINOPHIL # BLD: 0.2 K/UL (ref 0–0.6)
EOSINOPHIL NFR BLD: 3 %
GFR SERPLBLD CREATININE-BSD FMLA CKD-EPI: >90 ML/MIN/{1.73_M2}
GLUCOSE SERPL-MCNC: 106 MG/DL (ref 70–99)
HCT VFR BLD AUTO: 30.5 % (ref 36–48)
HGB BLD-MCNC: 10.4 G/DL (ref 12–16)
LYMPHOCYTES # BLD: 2.2 K/UL (ref 1–5.1)
LYMPHOCYTES NFR BLD: 40 %
MACROCYTES BLD QL SMEAR: ABNORMAL
MCH RBC QN AUTO: 30.2 PG (ref 26–34)
MCHC RBC AUTO-ENTMCNC: 34.1 G/DL (ref 31–36)
MCV RBC AUTO: 88.5 FL (ref 80–100)
METAMYELOCYTES NFR BLD MANUAL: 1 %
MONOCYTES # BLD: 0.2 K/UL (ref 0–1.3)
MONOCYTES NFR BLD: 4 %
NEUTROPHILS # BLD: 2.9 K/UL (ref 1.7–7.7)
NEUTROPHILS NFR BLD: 50 %
NEUTS BAND NFR BLD MANUAL: 1 % (ref 0–7)
OVALOCYTES BLD QL SMEAR: ABNORMAL
PLATELET # BLD AUTO: 257 K/UL (ref 135–450)
PLATELET BLD QL SMEAR: ADEQUATE
PMV BLD AUTO: 6.7 FL (ref 5–10.5)
POIKILOCYTOSIS BLD QL SMEAR: ABNORMAL
POLYCHROMASIA BLD QL SMEAR: ABNORMAL
POTASSIUM SERPL-SCNC: 4.1 MMOL/L (ref 3.5–5.1)
RBC # BLD AUTO: 3.45 M/UL (ref 4–5.2)
SLIDE REVIEW: ABNORMAL
SODIUM SERPL-SCNC: 138 MMOL/L (ref 136–145)
WBC # BLD AUTO: 5.5 K/UL (ref 4–11)

## 2025-06-30 PROCEDURE — 85025 COMPLETE CBC W/AUTO DIFF WBC: CPT

## 2025-06-30 PROCEDURE — 6370000000 HC RX 637 (ALT 250 FOR IP): Performed by: STUDENT IN AN ORGANIZED HEALTH CARE EDUCATION/TRAINING PROGRAM

## 2025-06-30 PROCEDURE — 97116 GAIT TRAINING THERAPY: CPT

## 2025-06-30 PROCEDURE — 36415 COLL VENOUS BLD VENIPUNCTURE: CPT

## 2025-06-30 PROCEDURE — 6360000002 HC RX W HCPCS: Performed by: STUDENT IN AN ORGANIZED HEALTH CARE EDUCATION/TRAINING PROGRAM

## 2025-06-30 PROCEDURE — 80048 BASIC METABOLIC PNL TOTAL CA: CPT

## 2025-06-30 PROCEDURE — 97530 THERAPEUTIC ACTIVITIES: CPT

## 2025-06-30 PROCEDURE — 97110 THERAPEUTIC EXERCISES: CPT

## 2025-06-30 PROCEDURE — 1280000000 HC REHAB R&B

## 2025-06-30 RX ORDER — ALENDRONATE SODIUM 70 MG/1
70 TABLET ORAL
COMMUNITY

## 2025-06-30 RX ORDER — PHENOL 1.4 %
2 AEROSOL, SPRAY (ML) MUCOUS MEMBRANE DAILY
COMMUNITY

## 2025-06-30 RX ORDER — ASPIRIN 81 MG/1
81 TABLET ORAL DAILY
Status: ON HOLD | COMMUNITY
End: 2025-07-01 | Stop reason: CLARIF

## 2025-06-30 RX ORDER — ESOMEPRAZOLE MAGNESIUM 20 MG/1
20 GRANULE, DELAYED RELEASE ORAL DAILY
COMMUNITY

## 2025-06-30 RX ADMIN — ENOXAPARIN SODIUM 40 MG: 100 INJECTION SUBCUTANEOUS at 09:32

## 2025-06-30 RX ADMIN — OXYCODONE AND ACETAMINOPHEN 1 TABLET: 5; 325 TABLET ORAL at 17:30

## 2025-06-30 RX ADMIN — ATORVASTATIN CALCIUM 40 MG: 40 TABLET, FILM COATED ORAL at 21:24

## 2025-06-30 RX ADMIN — OXYCODONE AND ACETAMINOPHEN 1 TABLET: 5; 325 TABLET ORAL at 11:50

## 2025-06-30 RX ADMIN — Medication 2000 UNITS: at 07:44

## 2025-06-30 RX ADMIN — PANTOPRAZOLE SODIUM 40 MG: 40 TABLET, DELAYED RELEASE ORAL at 06:14

## 2025-06-30 RX ADMIN — PANTOPRAZOLE SODIUM 40 MG: 40 TABLET, DELAYED RELEASE ORAL at 16:29

## 2025-06-30 RX ADMIN — CYANOCOBALAMIN TAB 500 MCG 500 MCG: 500 TAB at 07:44

## 2025-06-30 RX ADMIN — OXYCODONE AND ACETAMINOPHEN 1 TABLET: 5; 325 TABLET ORAL at 06:55

## 2025-06-30 RX ADMIN — OXYCODONE AND ACETAMINOPHEN 1 TABLET: 5; 325 TABLET ORAL at 21:23

## 2025-06-30 ASSESSMENT — PAIN DESCRIPTION - FREQUENCY: FREQUENCY: CONTINUOUS

## 2025-06-30 ASSESSMENT — PAIN DESCRIPTION - ORIENTATION
ORIENTATION: LEFT

## 2025-06-30 ASSESSMENT — PAIN SCALES - GENERAL
PAINLEVEL_OUTOF10: 5
PAINLEVEL_OUTOF10: 5
PAINLEVEL_OUTOF10: 7
PAINLEVEL_OUTOF10: 6
PAINLEVEL_OUTOF10: 5
PAINLEVEL_OUTOF10: 5
PAINLEVEL_OUTOF10: 6
PAINLEVEL_OUTOF10: 3
PAINLEVEL_OUTOF10: 6
PAINLEVEL_OUTOF10: 3

## 2025-06-30 ASSESSMENT — PAIN DESCRIPTION - LOCATION
LOCATION: HIP;LEG

## 2025-06-30 ASSESSMENT — PAIN DESCRIPTION - DESCRIPTORS
DESCRIPTORS: ACHING
DESCRIPTORS: ACHING
DESCRIPTORS: ACHING;DISCOMFORT

## 2025-06-30 ASSESSMENT — PAIN DESCRIPTION - PAIN TYPE: TYPE: ACUTE PAIN;SURGICAL PAIN

## 2025-06-30 ASSESSMENT — PAIN - FUNCTIONAL ASSESSMENT: PAIN_FUNCTIONAL_ASSESSMENT: PREVENTS OR INTERFERES SOME ACTIVE ACTIVITIES AND ADLS

## 2025-06-30 ASSESSMENT — PAIN DESCRIPTION - ONSET: ONSET: ON-GOING

## 2025-06-30 NOTE — PROGRESS NOTES
Physical Therapy  Facility/Department: [facility]  Rehabilitation Physical Therapy Treatment Note    NAME: Lexi Benitez  : 1945 (79 y.o.)  MRN: [id number]  CODE STATUS: Full Code    Date of Service: 25       Restrictions:  Restrictions/Precautions: Weight Bearing, ROM Restrictions, Fall Risk, General Precautions  Lower Extremity Weight Bearing Restrictions  Right Lower Extremity Weight Bearing: Weight Bearing As Tolerated  Left Lower Extremity Weight Bearing: Weight Bearing As Tolerated  Position Activity Restriction  Hip Precautions: No hip flexion > 90 degrees, No ADduction, No hip internal rotation, Posterior hip precautions  Other Position/Activity Restrictions: WBAT LLE, posterior hip precautions LLE, up as tolerated, abductor pillow AAT while in bed     SUBJECTIVE  Subjective: pt found in bed  Pain: 5/10 pain L hip       OBJECTIVE  Cognition  Overall Cognitive Status: WFL  Arousal/Alertness: Appears intact  Following Commands: Appears intact  Attention Span: Appears intact  Memory: Appears intact  Safety Judgement: Decreased awareness of need for assistance  Problem Solving: Assistance required to generate solutions;Assistance required to implement solutions  Initiation: Appears intact  Sequencing: Appears intact  Orientation  Overall Orientation Status: Within Functional Limits  Orientation Level: Oriented X4    Functional Mobility  Supine to Sit  Assistance Level: Modified independent  Skilled Clinical Factors: bed flat and no rails, increased time  Sit to Stand  Assistance Level: Stand by assist  Skilled Clinical Factors: from various surfaces  Stand to Sit  Assistance Level: Stand by assist      Environmental Mobility  Ambulation  Surface: Level surface  Device: Rolling walker  Distance: 175 ft x 2 reps  Activity Comments: on 2nd set, pt searching for birds for environment scanning  Assistance Level: Stand by assist  Gait Deviations: Slow marlin;Decreased weight shift left;Decreased heel  strike left  Stairs  Stair Height: 6''  Device: Bilateral handrails  Number of Stairs: 12  Assistance Level: Stand by assist  Skilled Clinical Factors - Comments: step to pattern    Second Session  Pt found in recliner, pleasant and agreeable. .continues to rate pain 5-6/10 L hip    STS transfers completed with supv from variety of surfaces    Pt ambulated 175 ft with RW, SBA-supv demonstrating slightly antalgic gait with reciprocal pattern    Dyn stand: with shoulder width BELL, pt reaching RUE in various planes for beanbag to throw to target 8 ft away with 1 UE support on RW and SBA. Pt then ambulates to beanbags and picks up with RW for support and SBA, no LOB, a total of 9-10 min standing.     Gait x 50 ft x 2 reps with supv and RW    Car transfer completed with supv and no cues for safety    Pt dons 2# weight RLE, completed 20 reps of BLE seated ankle pumps, LAQ, RLE only marches, hip abduction with TKE    Gait x 180 ft with RW, SBA-supv  Sit to supine with mod ind  Pt in bed at end of session with call light/needs within reach and alarm donned.    ASSESSMENT/PROGRESS TOWARDS GOALS  Vital Signs  Pulse: 93  Heart Rate Source: Monitor  BP: 126/66  BP Location: Left upper arm  MAP (Calculated): 86  SpO2: 97 %  O2 Device: None (Room air)    Assessment  Assessment: pt found in bed, pleasant and agreeable to session. pt mod ind bed mobility, SBA-supv for transfers, gait x 175 ft with RW and SBA, Sba for 12 steps. continue to rec home with prn assist and OPPT. DME: RW  Activity Tolerance: Patient tolerated treatment well  PT Equipment Recommendations  Equipment Needed: Yes  Walker: Rolling    Goals  Patient Goals   Patient Goals : \"To get home...and to be ok once I get there\"  Short Term Goals  Time Frame for Short Term Goals: 7 days, 7/01/25  Short Term Goal 1: pt will complete bed mobility with supv  Short Term Goal 2: pt will complete functional transfers with supv and LRAD  Short Term Goal 3: pt will ambulate 150

## 2025-06-30 NOTE — PATIENT CARE CONFERENCE
with increased safety and independence. Patient tolerated being on her feet for 7 minutes and 20 seconds with supervision required during activity completion. Patient participated in a visual scanning, visual motor, maze cognitive assessment, and trailing making part B cognitive assessment in stance on the blue airex pad via the BITS to address balance, standing tolerance, fine motor control, and functional reach needed to perform self care activities with increased safety and independence. Patient with standing times of 3 minutes and ~6 minutes with good standing balance during task completion. Patient participated in a table top activity in stance where she had to sort a group of cards by color, then place them in numerical order, and then ambulate across the gym to place them to the corresponding number to address balance, standing tolerance, and sequencing needed to perform self-care activities with increased safety and independence. Patient with a total standing time of 12 minutes and 40 seconds with supervision required during task completion. Patient performed functional mobility from the therapy gym back to her room with the use of a rolling walker and required supervision. Patient requesting to lay back down upon returning to room. Patient went from sitting EOB to lying supine with supervision. Patient left in bed with alarm set and all needs within reach. Continue POC.  Activity Tolerance: Patient tolerated treatment well  Discharge Recommendations: Home with Home health OT;S Level 1;Home with assist PRN       SPEECH THERAPY (intentionally left blank if not actively being seen by this service):      NUTRITION  Weight - Scale: 62.9 kg (138 lb 11.2 oz) / Body mass index is 21.72 kg/m².  Diet Order: ADULT DIET; Regular  PO Meals Eaten (%): 26 - 50%  Malnutrition Status: At risk for malnutrition  Nutrition Education/Counseling: No recommendation at this time      CASE MANAGEMENT  Assessment: Patient lives with

## 2025-06-30 NOTE — TELEPHONE ENCOUNTER
Spoke with patient's primary decision maker about trying to get her in on 07/07 instead of 07/14. He stated I needed to contact her about that. I informed him that I have been attempting to call her for about a week now and he stated she had been trying to get in touch with me as well. He instructed me to her at 4 pm and he would also convey the information to her tomorrow when he sees her.

## 2025-06-30 NOTE — CARE COORDINATION
CM update: Spoke with patient in room to introduce myself and to evaluate any questions/concerns she may have. Patient expresses that therapy is going well but has concerns r/t recommended follow-up appointment with her surgeon. Reinforced to patient that care conference takes place tomorrow and we will know a discharge date at that time. Assured patient that I would stop in to see her tomorrow to discuss discharge recommendations. Informed patient that all outpatient follow-up appointments would need to be rescheduled until after discharge from ARU- patient verbalizes understanding. Inquired about available support patient will have when time to discharge home. Patient states that her  is able to provide 24hr supervision but is limited to provide any type of physical support. Patient states her  uses a RW to ambulate himself. Patient confirms that she would need a RW provided at discharge if recommended. Patient states that both of her daughters have responsibilities that would limit the support they can provide, however, pt states she has two grandsons that can help if/when needed. Patient expresses no further questions/concerns at this time.    Les Zhang RN

## 2025-06-30 NOTE — PLAN OF CARE
Problem: Discharge Planning  Goal: Discharge to home or other facility with appropriate resources  Outcome: Progressing     Problem: Pain  Goal: Verbalizes/displays adequate comfort level or baseline comfort level  6/30/2025 0952 by Clara Harris RN  Outcome: Progressing     Problem: Safety - Adult  Goal: Free from fall injury  6/30/2025 0952 by Clara Harris RN  Outcome: Progressing     Problem: ABCDS Injury Assessment  Goal: Absence of physical injury  6/30/2025 0952 by Clara Harris RN  Outcome: Progressing

## 2025-06-30 NOTE — PROGRESS NOTES
and required supervision. Patient participated in an axe throwing activity where she had to grab an axe on one side of the room, navigate through the black cobble stone pads, throw the axe, and ambulate back to the starting point to address balance and functional activity tolerance needed to perform functional activities with increased safety and independence. Patient tolerated being on her feet for 7 minutes and 20 seconds with supervision required during activity completion. Patient participated in a visual scanning, visual motor, maze cognitive assessment, and trailing making part B cognitive assessment in stance on the blue airex pad via the BITS to address balance, standing tolerance, fine motor control, and functional reach needed to perform self care activities with increased safety and independence. Patient with standing times of 3 minutes and ~6 minutes with good standing balance during task completion. Patient participated in a table top activity in stance where she had to sort a group of cards by color, then place them in numerical order, and then ambulate across the gym to place them to the corresponding number to address balance, standing tolerance, and sequencing needed to perform self-care activities with increased safety and independence. Patient with a total standing time of 12 minutes and 40 seconds with supervision required during task completion. Patient performed functional mobility from the therapy gym back to her room with the use of a rolling walker and required supervision. Patient requesting to lay back down upon returning to room. Patient went from sitting EOB to lying supine with supervision. Patient left in bed with alarm set and all needs within reach. Continue POC.  Activity Tolerance: Patient tolerated treatment well  Discharge Recommendations: Home with Home health OT;S Level 1;Home with assist PRN  OT Equipment Recommendations  Equipment Needed: Yes  Mobility Devices: ADL Assistive  Individual Concurrent Group Co-treatment   Time In 1230         Time Out 1300         Minutes 30         Timed Code Treatment Minutes: 30 Minutes     Second Session Therapy Time:   Individual Concurrent Group Co-treatment   Time In 1400         Time Out 1500         Minutes 60           Timed Code Treatment Minutes:  60 Minutes    Total Treatment Minutes: 90 Minutes    Noel Palomino, FIDELINA    Second Session: Yomi Jose OTR/MONICA

## 2025-06-30 NOTE — PROGRESS NOTES
strokes, Decreased fluidity  Propulsion Distance: 50 ft + 200 ft    Occupational therapy:   Feeding     Grooming/Oral Hygiene  Assistance Level: Supervision  Skilled Clinical Factors: standing at sink for 3:00 for brushing teeth and washing face  UE Bathing     LE Bathing     UE Dressing  Assistance Level: Set-up  Skilled Clinical Factors: doff t-shirt and don bra and t-shirt seated on bedside commode  LE Dressing  Equipment Provided: Reachers  Assistance Level: Moderate assistance, Contact guard assist  Skilled Clinical Factors: CGA-doff brief/hospital pants with reacher; modA-don brief and hospital pants, needing assist to thread BLE's  Putting On/Taking Off Footwear     Toileting  Assistance Level: Maximum assistance  Skilled Clinical Factors: ken-care    Speech therapy:    ADULT DIET; Regular    Body mass index is 21.72 kg/m².    Assessment and Plan:  Lexi Benitez is a 79 year old female with a past medical history significant for fibromyalgia, HLD, anxiety, pre-diabetes, and GERD who presented to [facility] on 6/21/25 with left hip pain after a fall, found to have left hip fracture s/p ORIF with prosthetic replacement. She was admitted to [facility] on 6/25/25 due to functional deficits below her baseline.      Left hip fracture s/p ORIF with prosthetic replacement   - WBAT with posterior hip precautions  - incision care  - multimodal pain control  - lovenox for 6 weeks - nursing to teach patient/family  - PT, OT     Anemia  - Hb stable  - monitor and transfuse for Hb<7     UTI, resolved  - completed abx during acute stay     GERD  - history of needing esophageal dilations  - pantoprazole BID     HLD   - statin     Chronic constipation  - continue bowel regimen      Bowels: adjust medications as needed for regular bowel movements      Bladder: Check PVR x 3.  IMC if PVR > 200ml or if any volume is > 500 ml.      Sleep: melatonin provided prn.      PPX  DVT: lovenox   GI: on PPI for GERD     Follow up appointments:

## 2025-07-01 PROCEDURE — 97530 THERAPEUTIC ACTIVITIES: CPT

## 2025-07-01 PROCEDURE — 6370000000 HC RX 637 (ALT 250 FOR IP): Performed by: STUDENT IN AN ORGANIZED HEALTH CARE EDUCATION/TRAINING PROGRAM

## 2025-07-01 PROCEDURE — 97110 THERAPEUTIC EXERCISES: CPT

## 2025-07-01 PROCEDURE — 97535 SELF CARE MNGMENT TRAINING: CPT

## 2025-07-01 PROCEDURE — 97116 GAIT TRAINING THERAPY: CPT

## 2025-07-01 PROCEDURE — 6360000002 HC RX W HCPCS: Performed by: STUDENT IN AN ORGANIZED HEALTH CARE EDUCATION/TRAINING PROGRAM

## 2025-07-01 PROCEDURE — 1280000000 HC REHAB R&B

## 2025-07-01 RX ADMIN — OXYCODONE AND ACETAMINOPHEN 2 TABLET: 5; 325 TABLET ORAL at 19:45

## 2025-07-01 RX ADMIN — ATORVASTATIN CALCIUM 40 MG: 40 TABLET, FILM COATED ORAL at 19:46

## 2025-07-01 RX ADMIN — PANTOPRAZOLE SODIUM 40 MG: 40 TABLET, DELAYED RELEASE ORAL at 16:49

## 2025-07-01 RX ADMIN — Medication 2000 UNITS: at 09:47

## 2025-07-01 RX ADMIN — ENOXAPARIN SODIUM 40 MG: 100 INJECTION SUBCUTANEOUS at 09:48

## 2025-07-01 RX ADMIN — OXYCODONE AND ACETAMINOPHEN 1 TABLET: 5; 325 TABLET ORAL at 12:10

## 2025-07-01 RX ADMIN — PANTOPRAZOLE SODIUM 40 MG: 40 TABLET, DELAYED RELEASE ORAL at 06:43

## 2025-07-01 RX ADMIN — OXYCODONE AND ACETAMINOPHEN 1 TABLET: 5; 325 TABLET ORAL at 06:43

## 2025-07-01 RX ADMIN — CYANOCOBALAMIN TAB 500 MCG 500 MCG: 500 TAB at 09:48

## 2025-07-01 ASSESSMENT — PAIN DESCRIPTION - LOCATION
LOCATION: HIP
LOCATION: LEG
LOCATION: LEG;HIP

## 2025-07-01 ASSESSMENT — PAIN SCALES - GENERAL
PAINLEVEL_OUTOF10: 6
PAINLEVEL_OUTOF10: 4
PAINLEVEL_OUTOF10: 6
PAINLEVEL_OUTOF10: 4
PAINLEVEL_OUTOF10: 3
PAINLEVEL_OUTOF10: 4
PAINLEVEL_OUTOF10: 9

## 2025-07-01 ASSESSMENT — PAIN DESCRIPTION - FREQUENCY: FREQUENCY: CONTINUOUS

## 2025-07-01 ASSESSMENT — PAIN - FUNCTIONAL ASSESSMENT
PAIN_FUNCTIONAL_ASSESSMENT: ACTIVITIES ARE NOT PREVENTED
PAIN_FUNCTIONAL_ASSESSMENT: PREVENTS OR INTERFERES SOME ACTIVE ACTIVITIES AND ADLS

## 2025-07-01 ASSESSMENT — PAIN DESCRIPTION - PAIN TYPE: TYPE: ACUTE PAIN;SURGICAL PAIN

## 2025-07-01 ASSESSMENT — PAIN DESCRIPTION - DESCRIPTORS
DESCRIPTORS: ACHING;DISCOMFORT
DESCRIPTORS: ACHING;DISCOMFORT
DESCRIPTORS: ACHING

## 2025-07-01 ASSESSMENT — PAIN DESCRIPTION - ORIENTATION
ORIENTATION: LEFT

## 2025-07-01 ASSESSMENT — PAIN DESCRIPTION - ONSET: ONSET: ON-GOING

## 2025-07-01 NOTE — PROGRESS NOTES
Lexi Benitez  7/1/2025  [id number]    Chief Complaint: Closed left hip fracture, initial encounter ([facility])    Subjective:   No acute events overnight. Today Lexi is seen in the gym with therapy. She reports some continued pain in her hip, but otherwise reports feeling well. She notes that the swelling in her thigh is improving.     No new labs.     ROS: denies f/c, n/v, cp, sob    Objective:  Patient Vitals for the past 24 hrs:   BP Temp Temp src Pulse Resp SpO2   07/01/25 1210 -- -- -- -- 16 --   07/01/25 1018 (!) 116/58 -- -- 85 -- 96 %   07/01/25 0735 (!) 119/59 97.3 °F (36.3 °C) Oral 83 16 98 %   07/01/25 0713 -- -- -- -- 16 --   06/30/25 2153 -- -- -- -- 16 --   06/30/25 2115 (!) 121/59 98.2 °F (36.8 °C) Oral 80 16 97 %   06/30/25 1800 -- -- -- -- 16 --   06/30/25 1730 -- -- -- -- 16 --     Gen: No distress, pleasant.   HEENT: Normocephalic, atraumatic.   CV: extremities well perfused  Resp: No respiratory distress. On room air  Abd: Soft, nondistended  Ext: No edema.   Neuro: Alert, oriented, appropriately interactive. Speech fluent  Psych: appropriate mood and affect    Wt Readings from Last 3 Encounters:   06/25/25 62.9 kg (138 lb 11.2 oz)   06/21/25 63 kg (139 lb)   12/03/24 64.5 kg (142 lb 3.2 oz)       Laboratory data:   Lab Results   Component Value Date    WBC 5.5 06/30/2025    HGB 10.4 (L) 06/30/2025    HCT 30.5 (L) 06/30/2025    MCV 88.5 06/30/2025     06/30/2025       Lab Results   Component Value Date/Time     06/30/2025 06:19 AM    K 4.1 06/30/2025 06:19 AM     06/30/2025 06:19 AM    CO2 25 06/30/2025 06:19 AM    BUN 12 06/30/2025 06:19 AM    CREATININE 0.6 06/30/2025 06:19 AM    GLUCOSE 106 06/30/2025 06:19 AM    CALCIUM 9.0 06/30/2025 06:19 AM        Therapy progress:  Physical therapy:  Bed Mobility:  Additional Factors: Head of bed raised, Increased time to complete, Verbal cues (verbal cues for L hip flex < 90 during bed mobility)  Sit>supine:  Assistance Level: Stand by

## 2025-07-01 NOTE — PROGRESS NOTES
Lovenox training initiated and understood, pt was able to complete teach back and demonstration administration.

## 2025-07-01 NOTE — PROGRESS NOTES
Occupational Therapy  Facility/Department: [facility]  Rehabilitation Occupational Therapy Daily Treatment Note    Date: 25  Patient Name: Lexi Benitez       Room: 0152/0152-01  MRN: [id number]  Account: [id number]   : 1945  (79 y.o.) Gender: female            Pt was bathed during OT session this date. Pt was Showered with soap/water with Supervision.          Past Medical History:  has a past medical history of Anxiety, Dyslipidemia, Fibromyalgia, GERD (gastroesophageal reflux disease), Hypercholesterolemia, Osteopenia, and Pre-diabetes.  Past Surgical History:   has a past surgical history that includes Hysterectomy; Tonsillectomy; eye surgery; LASIK (10/23/2012); Upper gastrointestinal endoscopy (2013); Colonoscopy (); Colonoscopy (2013); Colonoscopy (2017); Upper gastrointestinal endoscopy (2017); Cholecystectomy, laparoscopic (N/A, 2017); Gallbladder surgery; Upper gastrointestinal endoscopy (N/A, 2022); Upper gastrointestinal endoscopy (2022); and hip surgery (Left, 2025).    Restrictions  Restrictions/Precautions: Weight Bearing, ROM Restrictions, Fall Risk, General Precautions  Hip Precautions: No hip flexion > 90 degrees, No ADduction, No hip internal rotation, Posterior hip precautions  Other Position/Activity Restrictions: WBAT LLE, posterior hip precautions LLE, up as tolerated, abductor pillow AAT while in bed  Right Lower Extremity Weight Bearing: Weight Bearing As Tolerated  Left Lower Extremity Weight Bearing: Weight Bearing As Tolerated    Subjective  Subjective: Pt in w/c, pleasant, pain 7/10 in L hip, aching, able to continue with therapy with repositioning and emotional support, agreeable to OT session and ADL, /59, HR 83, O2 sats 98% on RA.  Restrictions/Precautions: Weight Bearing;ROM Restrictions;Fall Risk;General Precautions             Objective     Cognition  Overall Cognitive Status: Exceptions  Arousal/Alertness: Appears

## 2025-07-01 NOTE — PLAN OF CARE
Problem: Pain  Goal: Verbalizes/displays adequate comfort level or baseline comfort level  6/30/2025 2259 by Teri Rowley, RN  Outcome: Progressing  Note: Pain assessment completed. Nonpharmacological methods offered prior to and during times of pain. Medicated per orders as necessary.       Problem: Safety - Adult  Goal: Free from fall injury  6/30/2025 2259 by Teri Rowley, RN  Outcome: Progressing  Note: Pt. Free from falls or self injury at this time. Falls risk protocol maintained. Call light within reach.

## 2025-07-01 NOTE — PLAN OF CARE
Problem: Discharge Planning  Goal: Discharge to home or other facility with appropriate resources  Outcome: Progressing     Problem: Pain  Goal: Verbalizes/displays adequate comfort level or baseline comfort level  7/1/2025 1018 by Clara Harris RN  Outcome: Progressing     Problem: Safety - Adult  Goal: Free from fall injury  7/1/2025 1018 by Clara Harris, RN  Outcome: Progressing     Problem: ABCDS Injury Assessment  Goal: Absence of physical injury  Outcome: Progressing

## 2025-07-01 NOTE — PROGRESS NOTES
Physical Therapy  Facility/Department: [facility]  Rehabilitation Physical Therapy Treatment Note    NAME: Lexi Benitez  : 1945 (79 y.o.)  MRN: [id number]  CODE STATUS: Full Code    Date of Service: 25       Restrictions:  Restrictions/Precautions: Weight Bearing, ROM Restrictions, Fall Risk, General Precautions  Lower Extremity Weight Bearing Restrictions  Right Lower Extremity Weight Bearing: Weight Bearing As Tolerated  Left Lower Extremity Weight Bearing: Weight Bearing As Tolerated  Position Activity Restriction  Hip Precautions: No hip flexion > 90 degrees, No ADduction, No hip internal rotation, Posterior hip precautions  Other Position/Activity Restrictions: WBAT LLE, posterior hip precautions LLE, up as tolerated, abductor pillow AAT while in bed     SUBJECTIVE  Subjective: pt found in bed, upset about learning to give herself blood thinner shot  Pain: 5/10 pain L hip       OBJECTIVE  Cognition  Overall Cognitive Status: Exceptions  Arousal/Alertness: Appears intact  Following Commands: Appears intact  Attention Span: Appears intact  Memory: Appears intact  Safety Judgement: Decreased awareness of need for assistance  Problem Solving: Assistance required to generate solutions;Assistance required to implement solutions  Insights: Decreased awareness of deficits  Initiation: Appears intact  Sequencing: Appears intact  Orientation  Overall Orientation Status: Within Functional Limits  Orientation Level: Oriented X4    Functional Mobility  Supine to Sit  Assistance Level: Modified independent  Sit to Stand  Assistance Level: Supervision  Skilled Clinical Factors: from various surfaces  Stand to Sit  Assistance Level: Supervision      Environmental Mobility  Ambulation  Surface: Level surface  Device: Rolling walker  Distance: 150 ft + 75 ft  Assistance Level: Supervision  Gait Deviations: Slow marlin;Decreased weight shift left;Decreased heel strike left      PT Exercises  Exercise Treatment: pt  KATHERINE.  Education Method: Demonstration;Verbal  Barriers to Learning: None  Education Outcome: Verbalized understanding;Demonstrated understanding;Continued education needed        Therapy Time   Individual Concurrent Group Co-treatment   Time In 1000         Time Out 1030         Minutes 30           Timed Code Treatment Minutes: 30 Minutes     Second Session Therapy Time:   Individual Concurrent Group Co-treatment   Time In 1230         Time Out 1330         Minutes 60           Timed Code Treatment Minutes:  60    Total Treatment Minutes:  90    Elma Perez PT, 07/01/25 at 10:21 AM

## 2025-07-01 NOTE — CARE COORDINATION
Weekly team care conference with interdisciplinary team on: 7/1/25    Chart reviewed for length of stay. IP day # 6  Unit: ARU   Diagnosis and current status as per MD progress: closed left hip fracture  Consultants Following: Physical Medicine  Planned Discharge Date: 7/5/25  Durable medical equipment needed: RW, RTS w/ arms, shower chair  Discharge Plan: Home with OP therapy    CM update: Spoke with patient and  Salvador in room to discuss discharge planning. Informed patient and  that discharge is planned for Saturday 7/5 w/ a recommendation of OP therapy. Patient and  confirm they would like to do the OP therapy at the [facility] location.  confirms he is able to provide all transportation to/from and will also be providing transportation home day of discharge. Discussed recommended DME. Assured patient that I will work with Stacy to provide the recommended RW. Patient and  understand that insurance will not cover the RTS or shower chair. Discussed options of where to obtain/purchase. Patient and  have no further questions for case management. Will continue to follow.    Les Zhang RN

## 2025-07-02 ENCOUNTER — TELEPHONE (OUTPATIENT)
Dept: ORTHOPEDIC SURGERY | Age: 80
End: 2025-07-02

## 2025-07-02 PROCEDURE — 97110 THERAPEUTIC EXERCISES: CPT

## 2025-07-02 PROCEDURE — 6360000002 HC RX W HCPCS: Performed by: STUDENT IN AN ORGANIZED HEALTH CARE EDUCATION/TRAINING PROGRAM

## 2025-07-02 PROCEDURE — 97530 THERAPEUTIC ACTIVITIES: CPT

## 2025-07-02 PROCEDURE — 97535 SELF CARE MNGMENT TRAINING: CPT

## 2025-07-02 PROCEDURE — 6370000000 HC RX 637 (ALT 250 FOR IP): Performed by: STUDENT IN AN ORGANIZED HEALTH CARE EDUCATION/TRAINING PROGRAM

## 2025-07-02 PROCEDURE — 97116 GAIT TRAINING THERAPY: CPT

## 2025-07-02 PROCEDURE — 1280000000 HC REHAB R&B

## 2025-07-02 RX ORDER — ROSUVASTATIN CALCIUM 5 MG/1
5 TABLET, COATED ORAL EVERY EVENING
COMMUNITY

## 2025-07-02 RX ORDER — OXYCODONE AND ACETAMINOPHEN 5; 325 MG/1; MG/1
1 TABLET ORAL EVERY 6 HOURS PRN
Qty: 28 TABLET | Refills: 0 | Status: SHIPPED | OUTPATIENT
Start: 2025-07-05 | End: 2025-07-12

## 2025-07-02 RX ORDER — ENOXAPARIN SODIUM 100 MG/ML
40 INJECTION SUBCUTANEOUS DAILY
Qty: 11.2 ML | Refills: 0 | Status: SHIPPED | OUTPATIENT
Start: 2025-07-06 | End: 2025-08-03

## 2025-07-02 RX ADMIN — Medication 2000 UNITS: at 08:01

## 2025-07-02 RX ADMIN — PANTOPRAZOLE SODIUM 40 MG: 40 TABLET, DELAYED RELEASE ORAL at 15:15

## 2025-07-02 RX ADMIN — OXYCODONE AND ACETAMINOPHEN 1 TABLET: 5; 325 TABLET ORAL at 06:13

## 2025-07-02 RX ADMIN — OXYCODONE AND ACETAMINOPHEN 1 TABLET: 5; 325 TABLET ORAL at 15:15

## 2025-07-02 RX ADMIN — ENOXAPARIN SODIUM 40 MG: 100 INJECTION SUBCUTANEOUS at 08:12

## 2025-07-02 RX ADMIN — ATORVASTATIN CALCIUM 40 MG: 40 TABLET, FILM COATED ORAL at 20:07

## 2025-07-02 RX ADMIN — PANTOPRAZOLE SODIUM 40 MG: 40 TABLET, DELAYED RELEASE ORAL at 06:13

## 2025-07-02 RX ADMIN — OXYCODONE AND ACETAMINOPHEN 1 TABLET: 5; 325 TABLET ORAL at 20:07

## 2025-07-02 RX ADMIN — CYANOCOBALAMIN TAB 500 MCG 500 MCG: 500 TAB at 08:01

## 2025-07-02 RX ADMIN — OXYCODONE AND ACETAMINOPHEN 1 TABLET: 5; 325 TABLET ORAL at 11:34

## 2025-07-02 ASSESSMENT — PAIN DESCRIPTION - DESCRIPTORS
DESCRIPTORS: ACHING;DISCOMFORT
DESCRIPTORS: ACHING
DESCRIPTORS: ACHING;DISCOMFORT

## 2025-07-02 ASSESSMENT — PAIN SCALES - GENERAL
PAINLEVEL_OUTOF10: 5
PAINLEVEL_OUTOF10: 7
PAINLEVEL_OUTOF10: 7
PAINLEVEL_OUTOF10: 3
PAINLEVEL_OUTOF10: 4
PAINLEVEL_OUTOF10: 3
PAINLEVEL_OUTOF10: 3
PAINLEVEL_OUTOF10: 6
PAINLEVEL_OUTOF10: 6

## 2025-07-02 ASSESSMENT — PAIN DESCRIPTION - LOCATION
LOCATION: HIP
LOCATION: HIP;LEG
LOCATION: HIP

## 2025-07-02 ASSESSMENT — PAIN DESCRIPTION - PAIN TYPE: TYPE: ACUTE PAIN;SURGICAL PAIN

## 2025-07-02 ASSESSMENT — PAIN DESCRIPTION - ORIENTATION
ORIENTATION: LEFT

## 2025-07-02 ASSESSMENT — PAIN DESCRIPTION - ONSET: ONSET: ON-GOING

## 2025-07-02 ASSESSMENT — PAIN DESCRIPTION - FREQUENCY: FREQUENCY: CONTINUOUS

## 2025-07-02 NOTE — PROGRESS NOTES
Continued lovenox training with the patient. Spent 20 min with patient educating about lovenox and medication administration. Patient was able to demonstrate the administration to self and understood education.

## 2025-07-02 NOTE — PROGRESS NOTES
Lexi CLEMENT Emmanuel  7/2/2025  [id number]    Chief Complaint: Closed left hip fracture, initial encounter ([facility])    Subjective:   No acute events overnight. Today Lexi is seen with nursing present. She reports feeling ok. She is concerned about discharge home on Saturday. Discussed that she has been progressing well with therapy and is now supervision to independent.     No new labs. No indication to further trend.     ROS: denies f/c, n/v, cp, sob    Objective:  Patient Vitals for the past 24 hrs:   BP Temp Temp src Pulse Resp SpO2   07/02/25 1134 -- -- -- -- 16 --   07/02/25 1027 (!) 115/54 -- -- 91 -- 98 %   07/02/25 0757 (!) 118/59 -- -- 88 -- --   07/02/25 0745 (!) 113/54 98.3 °F (36.8 °C) Oral 86 16 97 %   07/01/25 2015 -- -- -- -- 16 --   07/01/25 1930 (!) 121/59 98.2 °F (36.8 °C) Oral (!) 104 16 99 %   07/01/25 1240 -- -- -- -- 16 --   07/01/25 1210 -- -- -- -- 16 --     Gen: No distress, pleasant. Seated up in bed  HEENT: Normocephalic, atraumatic.   CV: extremities well perfused  Resp: No respiratory distress. On room air  Abd: Soft, nondistended  Ext: No edema.   Neuro: Alert, oriented, appropriately interactive. Speech fluent  Psych: appropriate mood and affect    Wt Readings from Last 3 Encounters:   06/25/25 62.9 kg (138 lb 11.2 oz)   06/21/25 63 kg (139 lb)   12/03/24 64.5 kg (142 lb 3.2 oz)       Laboratory data:   Lab Results   Component Value Date    WBC 5.5 06/30/2025    HGB 10.4 (L) 06/30/2025    HCT 30.5 (L) 06/30/2025    MCV 88.5 06/30/2025     06/30/2025       Lab Results   Component Value Date/Time     06/30/2025 06:19 AM    K 4.1 06/30/2025 06:19 AM     06/30/2025 06:19 AM    CO2 25 06/30/2025 06:19 AM    BUN 12 06/30/2025 06:19 AM    CREATININE 0.6 06/30/2025 06:19 AM    GLUCOSE 106 06/30/2025 06:19 AM    CALCIUM 9.0 06/30/2025 06:19 AM        Therapy progress:  Physical therapy:  Bed Mobility:  Additional Factors: Head of bed raised, Increased time to complete, Verbal cues  Increased time to complete  Assistance Required to Manage Parts: Left leg rest, Right leg rest  Assistance Level for Propulsion: Stand by assist  Propulsion Method: Bilateral upper extremities  Propulsion Quality: Slow velocity, Short strokes, Decreased fluidity  Propulsion Distance: 50 ft + 200 ft    Occupational therapy:   Feeding     Grooming/Oral Hygiene  Assistance Level: Modified independent  Skilled Clinical Factors: Mod I to complete oral care and hand washing at sink  UE Bathing  Assistance Level: Independent  Skilled Clinical Factors: seated on TTB  LE Bathing  Equipment Provided: Long-handled sponge  Assistance Level: Supervision  Skilled Clinical Factors: SPV for balance in stance briefly to bathe buttocks/groin in stance, min VCs for thoroughness, good use of long handled sponge to bathe feet/lower legs  UE Dressing  Assistance Level: Set-up  Skilled Clinical Factors: doff t-shirt and don bra and t-shirt seated on TTB  LE Dressing  Equipment Provided: Reachers  Assistance Level: Supervision  Skilled Clinical Factors: good use of reacher to doff/don brief/pants, SPV to stand and manage over hips  Putting On/Taking Off Footwear  Equipment Provided: Reachers, Sock aid  Assistance Level: Supervision  Skilled Clinical Factors: min VCs for problem solving to don socks with sock aid, good adherence to hip precautions  Toileting  Assistance Level: Supervision  Skilled Clinical Factors: Pt completes with SUP using 3 in 1 commode    Speech therapy:    ADULT DIET; Regular    Body mass index is 21.72 kg/m².    Assessment and Plan:  Lexi Benitez is a 79 year old female with a past medical history significant for fibromyalgia, HLD, anxiety, pre-diabetes, and GERD who presented to [facility] on 6/21/25 with left hip pain after a fall, found to have left hip fracture s/p ORIF with prosthetic replacement. She was admitted to [facility] on 6/25/25 due to functional deficits below her baseline.      Left hip fracture s/p ORIF

## 2025-07-02 NOTE — CARE COORDINATION
CM update: Received approval from therapy team that a home visit w/ patient and family has been approved for 12pm tomorrow.    Received call from daughter Karli who expresses her concern about her mother discharging home on Saturday 7/5 and verbalizes the possibility of appealing discharge. Discussed patient's progress per therapy notes w/ daughter (supervision to Independent) and also informed of approval for home visit w/ therapy team. Daughter confirms that she and her sister as well as  will be participating in the home visit and verbalize understanding that they will need to provide transportation for the patient. Reinforced with daughter that I will plan to meet with patient and family tomorrow after they return. Also reinforced to daughter that RW will be provided at discharge. The recommended shower chair and RTS w/ arms would not be covered by insurance but patient does have the option to private pay for that equipment and it can be delivered to the room as well prior to discharge for family to take home. Daughter verbalizes understanding of all discussed. Will continue to follow.    Les Zhang RN

## 2025-07-02 NOTE — PROGRESS NOTES
Comprehensive Nutrition Assessment    Type and Reason for Visit:  Reassess    Nutrition Recommendations/Plan:   Continue general diet   Encourage PO and protein intakes for healing   Monitor nutrition adequacy, pertinent labs, bowel habits, wt changes, and clinical progress     Malnutrition Assessment:  Malnutrition Status:  At risk for malnutrition (06/26/25 1245)    Context:  Acute Illness     Findings of the 6 clinical characteristics of malnutrition:  Energy Intake:  Mild decrease in energy intake (no intakes recorded, difficult to assess PO intakes)  Weight Loss:  Unable to assess (SUSIE stated weight hx, pt denies weight loss this stay)     Body Fat Loss:  Unable to assess     Muscle Mass Loss:  Mild muscle mass loss Clavicles (pectoralis & deltoids)  Fluid Accumulation:  Unable to assess     Strength:  Not Performed    Nutrition Assessment:    Follow up: Pt nutritionally improving AEB increased appetite and PO intakes. PO intakes of 1-100% since last visit. Pt reports appetite slowly improving. Reports PO intakes similar to how much she normally eats at home. Reports eating 3 small meals daily at home. Discussed importance of nutrition for healing. Denies any further nutrition questions or concerns at this time. Will monitor.    Nutrition Related Findings:    Labs reviewed. Active BS. BM on 6/27. Wound Type: Surgical Incision       Current Nutrition Intake & Therapies:    Average Meal Intake: 1-25%, 26-50%, 51-75%, %  Average Supplements Intake: None Ordered  ADULT DIET; Regular    Anthropometric Measures:  Height: 170.2 cm (5' 7\")  Ideal Body Weight (IBW): 135 lbs (61 kg)       Current Body Weight: 62.6 kg (138 lb), 102.2 % IBW. Weight Source: Standing scale  Current BMI (kg/m2): 21.6  Usual Body Weight:  (stated weight hx)                          BMI Categories: Underweight (BMI less than 22) age over 65    Estimated Daily Nutrient Needs:  Energy Requirements Based On: Kcal/kg (25-30)  Weight Used

## 2025-07-02 NOTE — TELEPHONE ENCOUNTER
LVM informing patient that I received her VM stating I can go ahead and schedule her post-op appointments. I informed her the date and time of all three post-op appointments. I gave my direct office number and the main department number for call back if she had any questions or needed to reschedule any of those appointments.

## 2025-07-02 NOTE — PROGRESS NOTES
Physical Therapy  Facility/Department: [facility]  Rehabilitation Physical Therapy Treatment Note    NAME: Lexi Benitez  : 1945 (79 y.o.)  MRN: [id number]  CODE STATUS: Full Code    Date of Service: 25       Restrictions:  Restrictions/Precautions: Weight Bearing, ROM Restrictions, Fall Risk, General Precautions  Lower Extremity Weight Bearing Restrictions  Right Lower Extremity Weight Bearing: Weight Bearing As Tolerated  Left Lower Extremity Weight Bearing: Weight Bearing As Tolerated  Position Activity Restriction  Hip Precautions: No hip flexion > 90 degrees, No ADduction, No hip internal rotation, Posterior hip precautions  Other Position/Activity Restrictions: WBAT LLE, posterior hip precautions LLE, up as tolerated, abductor pillow AAT while in bed     SUBJECTIVE  Subjective: pt found in recliner, expressing concern about upcoming d/c date  Pain: 6/10 pain L hip       OBJECTIVE  Cognition  Overall Cognitive Status: Exceptions  Arousal/Alertness: Appears intact  Following Commands: Appears intact  Attention Span: Appears intact  Memory: Appears intact  Safety Judgement: Decreased awareness of need for assistance  Problem Solving: Assistance required to generate solutions;Assistance required to implement solutions  Insights: Decreased awareness of deficits  Initiation: Appears intact  Sequencing: Appears intact  Orientation  Overall Orientation Status: Within Functional Limits  Orientation Level: Oriented X4    Functional Mobility  Sit to Stand  Assistance Level: Supervision;Modified independent  Skilled Clinical Factors: from recliner and chair to RW or no AD with supv progressing to mod ind.  Stand to Sit  Assistance Level: Supervision;Modified independent      Environmental Mobility  Ambulation  Surface: Level surface  Device: Rolling walker  Distance: 200 ft  Assistance Level: Supervision;Modified independent  Gait Deviations: Slow marlin;Decreased weight shift left;Decreased heel strike

## 2025-07-02 NOTE — PROGRESS NOTES
Occupational Therapy  Facility/Department: [facility]  Rehabilitation Occupational Therapy Daily Treatment Note    Date: 25  Patient Name: Lexi Benitez       Room: 0152/0152-01  MRN: [id number]  Account: [id number]   : 1945  (79 y.o.) Gender: female       Past Medical History:  has a past medical history of Anxiety, Dyslipidemia, Fibromyalgia, GERD (gastroesophageal reflux disease), Hypercholesterolemia, Osteopenia, and Pre-diabetes.  Past Surgical History:   has a past surgical history that includes Hysterectomy; Tonsillectomy; eye surgery; LASIK (10/23/2012); Upper gastrointestinal endoscopy (2013); Colonoscopy (); Colonoscopy (2013); Colonoscopy (2017); Upper gastrointestinal endoscopy (2017); Cholecystectomy, laparoscopic (N/A, 2017); Gallbladder surgery; Upper gastrointestinal endoscopy (N/A, 2022); Upper gastrointestinal endoscopy (2022); and hip surgery (Left, 2025).    Restrictions  Restrictions/Precautions: Weight Bearing, ROM Restrictions, Fall Risk, General Precautions  Hip Precautions: No hip flexion > 90 degrees, No ADduction, No hip internal rotation, Posterior hip precautions  Other Position/Activity Restrictions: WBAT LLE, posterior hip precautions LLE, up as tolerated, abductor pillow AAT while in bed  Right Lower Extremity Weight Bearing: Weight Bearing As Tolerated  Left Lower Extremity Weight Bearing: Weight Bearing As Tolerated    Subjective  Subjective: Pt sitting in therapy gym upon arrival. She is pleasant and agreeable.  Restrictions/Precautions: Weight Bearing;ROM Restrictions;Fall Risk;General Precautions     Objective     Cognition  Overall Cognitive Status: Exceptions  Arousal/Alertness: Appears intact  Following Commands: Appears intact  Attention Span: Appears intact  Memory: Appears intact  Safety Judgement: Decreased awareness of need for assistance  Problem Solving: Assistance required to generate solutions;Assistance required  to implement solutions  Insights: Decreased awareness of deficits  Initiation: Appears intact  Sequencing: Appears intact  Orientation  Overall Orientation Status: Within Functional Limits  Orientation Level: Oriented X4         ADL     Pt politely declines ADLs this session.         Functional Mobility  Device: Rolling walker  Assistance Level: Stand by assist  Skilled Clinical Factors: Pt ambulated in therapy gym with RW and back to room at EOS.   OT Exercises  Dynamic Sitting Balance Exercises: Pt completed series of progressive core exercises in dynamic sitting in order to challenge trunkal stability and functional movement for improved ADL completion. While sitting on wai disk, pt completed warm up of 1 set x1 reps of bilateral LE marches. Next completed x1 set of bilateral shoulder punch-outs with 8lb weigted ball (x1 LE contact with ground). Completed x2 sets of 10 reps of modified V-up while maintaining hip precautions. Next, pt completed 3 sets x10 reps of russian twist with LLE extended using 4lb weighted ball.  Dynamic Standing Balance Exercises: Pt completed series of therapeutic activities in static/dynamic stance in order to maximize functional performance with ADLs. Pt completed image copy activity involving lego building on vertical surface. Pt able to copy full lego image while reaching for lego peices at knee hieight and arranging them at head height consisting of x25 total pieces within 5 min total. Pt able to maintain hip precuations and safety awareness throughout (utilizing small heal/pivots rather than inadvertantly internally rotating LLE from planted positiont). Pt then copied the same lego image to lego board placed on vertical surface at waist hieght with instructions to perform a mini-squate for every lego piece placed. Pt placed 25 total peices correctly with good technique and activity tolerance.     Assessment  Assessment  Assessment: Pt in therapy gym upon arrival. She is pleasant

## 2025-07-02 NOTE — PLAN OF CARE
Problem: Pain  Goal: Verbalizes/displays adequate comfort level or baseline comfort level  7/2/2025 0008 by Teri Rowley, RN  Outcome: Progressing  Note: Pain assessment completed. Nonpharmacological methods offered prior to and during times of pain. Medicated per orders as necessary.       Problem: Safety - Adult  Goal: Free from fall injury  7/2/2025 0008 by Teri Rowley, RN  Outcome: Progressing  Note: Pt. Free from falls or self injury at this time. Falls risk protocol maintained. Call light within reach.

## 2025-07-02 NOTE — PLAN OF CARE
Problem: Discharge Planning  Goal: Discharge to home or other facility with appropriate resources  Outcome: Progressing     Problem: Pain  Goal: Verbalizes/displays adequate comfort level or baseline comfort level  7/2/2025 1009 by Clara Harris RN  Outcome: Progressing     Problem: Safety - Adult  Goal: Free from fall injury  7/2/2025 1009 by Clara Harris, RN  Outcome: Progressing     Problem: ABCDS Injury Assessment  Goal: Absence of physical injury  Outcome: Progressing

## 2025-07-02 NOTE — CARE COORDINATION
CM update: Attempt made to speak with patient. Patient currently out of room working with therapy. Will attempt to speak with patient at a later time.     Les Zhang RN

## 2025-07-02 NOTE — TELEPHONE ENCOUNTER
Patient LVM stating I could schedule her 3 post-op appointments and she will let me know if those work or don't work for her.

## 2025-07-02 NOTE — PROGRESS NOTES
Occupational Therapy  Facility/Department: [facility]  Rehabilitation Occupational Therapy Daily Treatment Note    Date: 25  Patient Name: Lexi Benitez       Room: 0152/0152-01  MRN: [id number]  Account: [id number]   : 1945  (79 y.o.) Gender: female     Past Medical History:  has a past medical history of Anxiety, Dyslipidemia, Fibromyalgia, GERD (gastroesophageal reflux disease), Hypercholesterolemia, Osteopenia, and Pre-diabetes.  Past Surgical History:   has a past surgical history that includes Hysterectomy; Tonsillectomy; eye surgery; LASIK (10/23/2012); Upper gastrointestinal endoscopy (2013); Colonoscopy (); Colonoscopy (2013); Colonoscopy (2017); Upper gastrointestinal endoscopy (2017); Cholecystectomy, laparoscopic (N/A, 2017); Gallbladder surgery; Upper gastrointestinal endoscopy (N/A, 2022); Upper gastrointestinal endoscopy (2022); and hip surgery (Left, 2025).    Restrictions  Restrictions/Precautions: Weight Bearing, ROM Restrictions, Fall Risk, General Precautions  Hip Precautions: No hip flexion > 90 degrees, No ADduction, No hip internal rotation, Posterior hip precautions  Other Position/Activity Restrictions: WBAT LLE, posterior hip precautions LLE, up as tolerated, abductor pillow AAT while in bed  Right Lower Extremity Weight Bearing: Weight Bearing As Tolerated  Left Lower Extremity Weight Bearing: Weight Bearing As Tolerated    Subjective  Subjective: Patient agreeable. Pt reports 5/10 pain in LLE. BP: 128/60, HR: 97, SpO2: 99%  Restrictions/Precautions: Weight Bearing;ROM Restrictions;Fall Risk;General Precautions      Objective     Cognition  Overall Cognitive Status: Exceptions  Arousal/Alertness: Appears intact  Following Commands: Appears intact  Attention Span: Appears intact  Memory: Appears intact  Safety Judgement: Decreased awareness of need for assistance  Problem Solving: Assistance required to generate solutions;Assistance  bed    Patient Education  Education  Education Given To: Patient  Education Provided: Role of Therapy;Plan of Care;Precautions;Safety;ADL Function;DME/Home Modifications;Equipment;Transfer Training;Mobility Training;IADL Function  Education Provided Comments: role of OT, (I)ADL retraining, transfers training, use of AD, safety with transfers and ADLs, use of a/e  Education Method: Verbal;Demonstration  Barriers to Learning: None  Education Outcome: Verbalized understanding;Demonstrated understanding    Plan  Occupational Therapy Plan  Times Per Week: 5-7x  Current Treatment Recommendations: Balance training;Functional mobility training;Endurance training;Strengthening;Self-Care / ADL;Safety education & training;Equipment evaluation, education, & procurement;Patient/Caregiver education & training;ROM;Home management training;Pain management    Goals  Patient Goals   Patient goals : \"to get home...and to be ok once I get there\"  Short Term Goals  Time Frame for Short Term Goals: 1 week- 7/1/25  Short Term Goal 1: Pt complete toileting with SBA. GOAL MET 7/1/25 Pt completed toileting with SPV.  Short Term Goal 2: Pt will perform functional transfers with SBA. GOAL MET 7/1/25 Pt performed functional transfers with SPV.  Short Term Goal 3: Pt will complete LB dressing with SBA. GOAL MET 7/1/25 Pt completed LB dressing with SPV.  Short Term Goal 4: Pt will perform FB bathing with SBA. GOAL MET 7/1/25 Pt performed FB bathing with SPV.  Short Term Goal 5: Pt will complete grooming at sink with SPV. GOAL MET 6/27/25 Pt completed grooming with SPV.  Long Term Goals  Time Frame for Long Term Goals : 11 days- 7/5/25  Long Term Goal 1: Pt will complete functional transfers with mod I.  Long Term Goal 2: Pt will perform toileting with mod I.  Long Term Goal 3: Pt will complete LB dressing with mod I.  Long Term Goal 4: Pt will complete FB bathing with SPV. GOAL MET 7/1/25 Pt completed FB bathing with SPV.  Long Term Goal 5: Pt

## 2025-07-03 ENCOUNTER — TELEPHONE (OUTPATIENT)
Dept: ORTHOPEDIC SURGERY | Age: 80
End: 2025-07-03

## 2025-07-03 PROCEDURE — 97530 THERAPEUTIC ACTIVITIES: CPT

## 2025-07-03 PROCEDURE — 97535 SELF CARE MNGMENT TRAINING: CPT

## 2025-07-03 PROCEDURE — 97110 THERAPEUTIC EXERCISES: CPT

## 2025-07-03 PROCEDURE — 1280000000 HC REHAB R&B

## 2025-07-03 PROCEDURE — 6370000000 HC RX 637 (ALT 250 FOR IP): Performed by: STUDENT IN AN ORGANIZED HEALTH CARE EDUCATION/TRAINING PROGRAM

## 2025-07-03 PROCEDURE — 6360000002 HC RX W HCPCS: Performed by: STUDENT IN AN ORGANIZED HEALTH CARE EDUCATION/TRAINING PROGRAM

## 2025-07-03 PROCEDURE — 97116 GAIT TRAINING THERAPY: CPT

## 2025-07-03 RX ADMIN — OXYCODONE AND ACETAMINOPHEN 1 TABLET: 5; 325 TABLET ORAL at 15:51

## 2025-07-03 RX ADMIN — PANTOPRAZOLE SODIUM 40 MG: 40 TABLET, DELAYED RELEASE ORAL at 15:51

## 2025-07-03 RX ADMIN — ATORVASTATIN CALCIUM 40 MG: 40 TABLET, FILM COATED ORAL at 21:07

## 2025-07-03 RX ADMIN — CYANOCOBALAMIN TAB 500 MCG 500 MCG: 500 TAB at 08:22

## 2025-07-03 RX ADMIN — OXYCODONE AND ACETAMINOPHEN 1 TABLET: 5; 325 TABLET ORAL at 11:53

## 2025-07-03 RX ADMIN — OXYCODONE AND ACETAMINOPHEN 2 TABLET: 5; 325 TABLET ORAL at 21:07

## 2025-07-03 RX ADMIN — PANTOPRAZOLE SODIUM 40 MG: 40 TABLET, DELAYED RELEASE ORAL at 06:34

## 2025-07-03 RX ADMIN — Medication 2000 UNITS: at 08:22

## 2025-07-03 RX ADMIN — OXYCODONE AND ACETAMINOPHEN 1 TABLET: 5; 325 TABLET ORAL at 08:22

## 2025-07-03 RX ADMIN — ENOXAPARIN SODIUM 40 MG: 100 INJECTION SUBCUTANEOUS at 08:23

## 2025-07-03 RX ADMIN — OXYCODONE AND ACETAMINOPHEN 1 TABLET: 5; 325 TABLET ORAL at 01:33

## 2025-07-03 ASSESSMENT — PAIN - FUNCTIONAL ASSESSMENT
PAIN_FUNCTIONAL_ASSESSMENT: PREVENTS OR INTERFERES SOME ACTIVE ACTIVITIES AND ADLS
PAIN_FUNCTIONAL_ASSESSMENT: PREVENTS OR INTERFERES SOME ACTIVE ACTIVITIES AND ADLS
PAIN_FUNCTIONAL_ASSESSMENT: ACTIVITIES ARE NOT PREVENTED
PAIN_FUNCTIONAL_ASSESSMENT: ACTIVITIES ARE NOT PREVENTED
PAIN_FUNCTIONAL_ASSESSMENT: PREVENTS OR INTERFERES SOME ACTIVE ACTIVITIES AND ADLS

## 2025-07-03 ASSESSMENT — PAIN DESCRIPTION - LOCATION
LOCATION: GENERALIZED
LOCATION: LEG
LOCATION: GENERALIZED

## 2025-07-03 ASSESSMENT — PAIN SCALES - GENERAL
PAINLEVEL_OUTOF10: 8
PAINLEVEL_OUTOF10: 7
PAINLEVEL_OUTOF10: 6
PAINLEVEL_OUTOF10: 7

## 2025-07-03 ASSESSMENT — PAIN DESCRIPTION - DESCRIPTORS
DESCRIPTORS: ACHING
DESCRIPTORS: ACHING;DISCOMFORT

## 2025-07-03 ASSESSMENT — PAIN DESCRIPTION - ORIENTATION
ORIENTATION: ANTERIOR;POSTERIOR
ORIENTATION: LEFT

## 2025-07-03 NOTE — CARE COORDINATION
CM update: Met with patient and daughter's Karli and Marielena in room who inform that the home visit w/ OT and PT went well and patient reports feeling comfortable to discharge home. Patient and family understand that discharge is planned for Saturday 7/5 and patient confirms she would still like to do her OP therapy at the [facility] location. OT Gerald has confirmed final DME recommendations: RW, shower chair w/ back, and 3in1 BSC. Patient understands that RW is the only item covered under insurance. Patient and family would like to pay out of pocket for the shower chair and BSC (Kiara from [employer] made aware). Patient and family verbalize understanding of all discussed and all questions answered. No further needs from case management.    Case Management Discharge Summary  [facility] - Acute Rehab Unit    Patient:Lexi Benitez     Rehab Dx/Hx: Closed left hip fracture, initial encounter ([facility]) [S72.002A]       Today's Date: 7/3/2025    Discharge to:  Home w/ family 7/5 w/ OP therapy at [facility]: PT/OT- scripts sent electronically as well as paper copies provided to patient and family   Pre-certification completed:  [] No       [] Yes     [x] N/A   Hospital Exemption Notification (HENS) completed:  [] No       [] Yes     [x] N/A   IMM given:  Yes 7/3/25 2:33pm       New Durable Medical Equipment ordered/agency:  RW, shower chair w/ back, and BSC provided through [employer]   Transportation:  Agency used: private car via family  Ambulance form completed: [] No       [] Yes   [x] N/A       Confirmed discharge plan with:  Patient: [] No       [x] Yes  Family:  [] No       [x] Yes      Name: Daughter Karli  Contact number: [phone]  Facility/Agency, name:OP Therapy&Rehab - [facility]   Phone number for report to facility: N/A  RN, name: Maritza IVERSON       Has lack of transportation kept you from medical appointments, meetings, work, or ADL's? [] Yes, it has kept me from medical appointments or from getting my  meds  [] Yes, It has kept me from non-medical meetings, appointments, work, or getting things I need  [x] No  [] Pt unable to respond  [] Pt declines to respond     How often do you need to have someone help you when you read instructions, pamphlets, or other written material from your doctor or pharmacy? [x] Never                             [] Always  [] Rarely                            [] Patient declines to respond  [] Sometimes                    [] Patient unable to respond  [] Often       Note: Discharging nurse to complete VIK, reconcile AVS, and place final copy with patient's discharge packet. RN to ensure that written prescriptions for  Level II medications are sent with patient to the facility as per protocol.          Signature: Les Zhang RN

## 2025-07-03 NOTE — PROGRESS NOTES
[]Grab bar:______________   [] Other:   Light switch accessible? [x] Yes [] No  Maneuverability in room/Safety recommendations: _18 in toilet in master bath, 17 in toilet in 2nd bath. Pt demos mod ind with toilet frame. 20 in shower chair, 33 in wide entrance to walk-in closet, 31 in height sink in both bathrooms. Pt demos ability to manage RW in bathroom/walk in closet. Demos ability to complete shower chair transfer (backs up to shower and steps into shower over 3inch lip, then sits to chair x 2 reps with mod ind)._______________    Kitchen  Door Accessibility?  [x]Yes []No     Width/comments: ___________  Light switch/outlets accessible? [x] Yes [] No  Stove accessible?  [x] Yes [] No  Sink accessible?  [x] Yes [] No  Refrigerator/freezer accessible?  [x] Yes [] No   Countertops accessible?  [x] Yes [] No  Dining table accessible?  [x] Yes [] No  Maneuverability in room/Safety recommendations: _41\" width between kitchen sink and oven. _mod ind kitchen table chair transfer. Pt educated to manage items on high shelves only (per hip precautions). ____________________________________    Main Living  Door Accessibility?  [x]Yes []No     Width/comments: _43 in width hallway__________  Light switch/outlets accessible? [x] Yes [] No  Maneuverability in room/Safety recommendations: _demos ability to sit on couch (should not recline on couch as to maintain hip precautions) with mod ind and manage mobility in living room including navigating carpet. 17 in couch. Pt and family educated to use cushion or some sort of riser to sit on couch if desiring to use footrest on couch. Spouse demonstrated ability to move RW out of way while seated on couch as to adhere to hip precautions.___________________________________________________    Miscellaneous  Is telephone accessible? [x]Yes []No       Hallway accessibility? [x]Yes []No     Width/comments: __43\"_________  Washer/dryer: _31\" door, located in 2nd bathroom.Pt educated on use

## 2025-07-03 NOTE — PROGRESS NOTES
Lexi CLEMENT Emmanuel  7/3/2025  [id number]    Chief Complaint: Closed left hip fracture, initial encounter ([facility])    Subjective:   No acute events overnight. Today Lexi is seen in her room. She reports feeling well. She is going with therapy for a home visit today. She denies acute complaints at this time.     No new labs. No need to further trend.     ROS: denies f/c, n/v, cp, sob    Objective:  Patient Vitals for the past 24 hrs:   BP Temp Temp src Pulse Resp SpO2   07/03/25 0909 (!) 117/56 -- -- 84 -- 94 %   07/03/25 0819 (!) 119/57 97.9 °F (36.6 °C) Oral 90 16 97 %   07/02/25 2037 -- -- -- -- 18 --   07/02/25 2000 (!) 120/59 98.3 °F (36.8 °C) Oral 86 16 98 %   07/02/25 1545 -- -- -- -- 16 --   07/02/25 1515 -- -- -- -- 16 --   07/02/25 1204 -- -- -- -- 16 --   07/02/25 1134 -- -- -- -- 16 --   07/02/25 1027 (!) 115/54 -- -- 91 -- 98 %     Gen: No distress, pleasant.   HEENT: Normocephalic, atraumatic.   CV: RRR  Resp: No respiratory distress. Lungs CTAB  Abd: Soft, nondistended  Ext: No edema.   Neuro: Alert, oriented, appropriately interactive. Speech fluent  Psych: appropriate mood and affect    Wt Readings from Last 3 Encounters:   06/25/25 62.9 kg (138 lb 11.2 oz)   06/21/25 63 kg (139 lb)   12/03/24 64.5 kg (142 lb 3.2 oz)       Laboratory data:   Lab Results   Component Value Date    WBC 5.5 06/30/2025    HGB 10.4 (L) 06/30/2025    HCT 30.5 (L) 06/30/2025    MCV 88.5 06/30/2025     06/30/2025       Lab Results   Component Value Date/Time     06/30/2025 06:19 AM    K 4.1 06/30/2025 06:19 AM     06/30/2025 06:19 AM    CO2 25 06/30/2025 06:19 AM    BUN 12 06/30/2025 06:19 AM    CREATININE 0.6 06/30/2025 06:19 AM    GLUCOSE 106 06/30/2025 06:19 AM    CALCIUM 9.0 06/30/2025 06:19 AM        Therapy progress:  Physical therapy:  Bed Mobility:  Additional Factors: Head of bed raised, Increased time to complete, Verbal cues (verbal cues for L hip flex < 90 during bed mobility)  Sit>supine:  Assistance  Level: Stand by assist  Skilled Clinical Factors: Increased time/effort, HOB elevated ~30 degrees  Supine>sit:  Assistance Level: Modified independent  Skilled Clinical Factors: bed flat and no rails, increased time  Transfers:  Surface: From chair with arms, From bed  Additional Factors: Verbal cues  Device: Walker (RW)  Sit>stand:  Assistance Level: Modified independent  Skilled Clinical Factors: from recliner and chair to RW  Stand>sit:  Assistance Level: Modified independent  Bed<>chair  Technique: Stand step  Assistance Level: Contact guard assist, Stand by assist  Skilled Clinical Factors: CGA/close SBA x 1 using RW, moving from chair>bed  Stand Pivot:     Lateral transfer:     Car transfer:  Assistance Level: Contact guard assist  Skilled Clinical Factors: verbal cues for hip flex < 90 deg  Ambulation:  Surface: Level surface  Device: Rolling walker  Distance: 190 ft + 80 ft  Activity: Within Room, Within Unit  Activity Comments: on 2nd set, pt searching for birds for environment scanning  Additional Factors: Set-up, Verbal cues  Assistance Level: Modified independent  Gait Deviations: Slow marlin, Decreased weight shift left, Decreased heel strike left  Skilled Clinical Factors: Pt demos partial step-through pattern with decreased gait speed.  Slight antalgic gait/decreased stance time on LLE vs. RLE although this normalized with repetition.  Steady using RW without LOB.  Stairs:  Stair Height: 6''  Device: Bilateral handrails  Number of Stairs: 12  Additional Factors: Non-reciprocal going up, Non-reciprocal going down, Increased time to complete, Verbal cues  Assistance Level: Stand by assist  Skilled Clinical Factors - Comments: step to pattern  Curb:     Wheelchair:  Surface: Level surface  Device: Standard wheelchair  Additional Factors: Verbal cues, Increased time to complete  Assistance Required to Manage Parts: Left leg rest, Right leg rest  Assistance Level for Propulsion: Stand by

## 2025-07-03 NOTE — PROGRESS NOTES
Occupational Therapy  Facility/Department: [facility]  Rehabilitation Occupational Therapy Daily Treatment Note    Date: 7/3/25  Patient Name: Lexi Benitez       Room: 0152/0152-01  MRN: [id number]  Account: [id number]   : 1945  (79 y.o.) Gender: female                    Past Medical History:  has a past medical history of Anxiety, Dyslipidemia, Fibromyalgia, GERD (gastroesophageal reflux disease), Hypercholesterolemia, Osteopenia, and Pre-diabetes.  Past Surgical History:   has a past surgical history that includes Hysterectomy; Tonsillectomy; eye surgery; LASIK (10/23/2012); Upper gastrointestinal endoscopy (2013); Colonoscopy (); Colonoscopy (2013); Colonoscopy (2017); Upper gastrointestinal endoscopy (2017); Cholecystectomy, laparoscopic (N/A, 2017); Gallbladder surgery; Upper gastrointestinal endoscopy (N/A, 2022); Upper gastrointestinal endoscopy (2022); and hip surgery (Left, 2025).    Restrictions  Restrictions/Precautions: Weight Bearing, ROM Restrictions, Fall Risk, General Precautions  Hip Precautions: No hip flexion > 90 degrees, No ADduction, No hip internal rotation, Posterior hip precautions  Other Position/Activity Restrictions: WBAT LLE, posterior hip precautions LLE, up as tolerated, abductor pillow AAT while in bed  Right Lower Extremity Weight Bearing: Weight Bearing As Tolerated  Left Lower Extremity Weight Bearing: Weight Bearing As Tolerated    Subjective  Subjective: Pt in recliner, pleasant, 5/10 pain in L hip, aching, able to continue with therapy with emotional support and distraction, agreeable to OT session, /56, HR 75, O2 sats 99% on RA.  Restrictions/Precautions: Weight Bearing;ROM Restrictions;Fall Risk;General Precautions             Objective     Cognition  Overall Cognitive Status: Exceptions  Arousal/Alertness: Appears intact  Following Commands: Appears intact  Attention Span: Appears intact  Memory: Appears

## 2025-07-03 NOTE — TELEPHONE ENCOUNTER
----- Message from Jacob ANDERSON sent at 7/3/2025  8:30 AM EDT -----  Regarding: Specialist Appointment Request - Established  Specialist Appointment Request - Established    What Specialty? Select Speciality: Orthopedics     Type of Appointment: Appointment Type: Post-Op    Reason for appointment?  [Enter diagnosis or symptom: LEFT HIP    Scheduling Preference per Patient: Date, Time, Provider: NOEMI      Additional Information: DAUGHTER CALLED STATES THAT PATIENT HAS NOT BEEN RELEASED FROM THE HOSPITAL YET SO THEY ARE NEEDING TO RESCHEDULED HER 2 WEEK POST OP FOLLOW APPOINTMENT EITHER LATER THAT WEEK OR THE FOLLOWING MONDAY. FIRST AVAIL SHOWN IS 7/21/25. PLEASE CALL TO ASSIST  --------------------------------------------------------------------------------------------------------------------------    Relationship to Patient: Timothy SAM  Call Back Information: OK to leave message on voicemail  Preferred Call Back Number: Phone [phone]

## 2025-07-03 NOTE — PROGRESS NOTES
Physical Therapy  Facility/Department: [facility]  Rehabilitation Physical Therapy Treatment Note    NAME: Lexi Benitez  : 1945 (79 y.o.)  MRN: [id number]  CODE STATUS: Full Code    Date of Service: 7/3/25       Restrictions:  Restrictions/Precautions: Weight Bearing, ROM Restrictions, Fall Risk, General Precautions  Lower Extremity Weight Bearing Restrictions  Right Lower Extremity Weight Bearing: Weight Bearing As Tolerated  Left Lower Extremity Weight Bearing: Weight Bearing As Tolerated  Position Activity Restriction  Hip Precautions: No hip flexion > 90 degrees, No ADduction, No hip internal rotation, Posterior hip precautions  Other Position/Activity Restrictions: WBAT LLE, posterior hip precautions LLE, up as tolerated, abductor pillow AAT while in bed     SUBJECTIVE  Subjective: pt found in recliner  Pain: 6/10 pain L hip, already recieved pain medication       OBJECTIVE  Cognition  Overall Cognitive Status: Exceptions  Arousal/Alertness: Appears intact  Following Commands: Appears intact  Attention Span: Appears intact  Memory: Appears intact  Safety Judgement: Decreased awareness of need for assistance  Problem Solving: Assistance required to generate solutions;Assistance required to implement solutions  Insights: Decreased awareness of deficits  Initiation: Appears intact  Sequencing: Appears intact  Orientation  Overall Orientation Status: Within Functional Limits  Orientation Level: Oriented X4    Functional Mobility  Sit to Stand  Assistance Level: Modified independent  Skilled Clinical Factors: from recliner and chair to RW  Stand to Sit  Assistance Level: Modified independent      Environmental Mobility  Ambulation  Surface: Level surface  Device: Rolling walker  Distance: 190 ft + 80 ft  Assistance Level: Modified independent  Gait Deviations: Slow marlin;Decreased weight shift left;Decreased heel strike left      PT Exercises  Exercise Treatment: pt completed 20 reps of BLE seated ankle

## 2025-07-03 NOTE — PLAN OF CARE
Problem: Discharge Planning  Goal: Discharge to home or other facility with appropriate resources  Flowsheets (Taken 7/3/2025 130)  Discharge to home or other facility with appropriate resources: Identify barriers to discharge with patient and caregiver     Problem: Pain  Goal: Verbalizes/displays adequate comfort level or baseline comfort level  Flowsheets (Taken 7/3/2025 130)  Verbalizes/displays adequate comfort level or baseline comfort level:   Encourage patient to monitor pain and request assistance   Assess pain using appropriate pain scale   Administer analgesics based on type and severity of pain and evaluate response   Implement non-pharmacological measures as appropriate and evaluate response     Problem: Safety - Adult  Goal: Free from fall injury  Flowsheets (Taken 7/3/2025 3256)  Free From Fall Injury: Instruct family/caregiver on patient safety

## 2025-07-03 NOTE — PLAN OF CARE
Problem: Pain  Goal: Verbalizes/displays adequate comfort level or baseline comfort level  7/2/2025 2257 by Teri Rowley, RN  Outcome: Progressing  Note: Pain assessment completed. Nonpharmacological methods offered prior to and during times of pain. Medicated per orders as necessary.       Problem: Safety - Adult  Goal: Free from fall injury  7/2/2025 2257 by Teri Rowley, RN  Outcome: Progressing  Note: Pt. Free from falls or self injury at this time. Falls risk protocol maintained. Call light within reach.

## 2025-07-04 PROCEDURE — 97116 GAIT TRAINING THERAPY: CPT

## 2025-07-04 PROCEDURE — 97535 SELF CARE MNGMENT TRAINING: CPT

## 2025-07-04 PROCEDURE — 6370000000 HC RX 637 (ALT 250 FOR IP): Performed by: STUDENT IN AN ORGANIZED HEALTH CARE EDUCATION/TRAINING PROGRAM

## 2025-07-04 PROCEDURE — 97110 THERAPEUTIC EXERCISES: CPT

## 2025-07-04 PROCEDURE — 97542 WHEELCHAIR MNGMENT TRAINING: CPT

## 2025-07-04 PROCEDURE — 97530 THERAPEUTIC ACTIVITIES: CPT

## 2025-07-04 PROCEDURE — 1280000000 HC REHAB R&B

## 2025-07-04 RX ADMIN — ATORVASTATIN CALCIUM 40 MG: 40 TABLET, FILM COATED ORAL at 21:20

## 2025-07-04 RX ADMIN — Medication 2000 UNITS: at 10:49

## 2025-07-04 RX ADMIN — PANTOPRAZOLE SODIUM 40 MG: 40 TABLET, DELAYED RELEASE ORAL at 15:35

## 2025-07-04 RX ADMIN — PANTOPRAZOLE SODIUM 40 MG: 40 TABLET, DELAYED RELEASE ORAL at 10:50

## 2025-07-04 RX ADMIN — OXYCODONE AND ACETAMINOPHEN 1 TABLET: 5; 325 TABLET ORAL at 06:44

## 2025-07-04 RX ADMIN — OXYCODONE AND ACETAMINOPHEN 1 TABLET: 5; 325 TABLET ORAL at 15:35

## 2025-07-04 RX ADMIN — OXYCODONE AND ACETAMINOPHEN 1 TABLET: 5; 325 TABLET ORAL at 21:20

## 2025-07-04 RX ADMIN — CYANOCOBALAMIN TAB 500 MCG 500 MCG: 500 TAB at 10:50

## 2025-07-04 RX ADMIN — OXYCODONE AND ACETAMINOPHEN 1 TABLET: 5; 325 TABLET ORAL at 10:49

## 2025-07-04 ASSESSMENT — PAIN SCALES - WONG BAKER: WONGBAKER_NUMERICALRESPONSE: NO HURT

## 2025-07-04 ASSESSMENT — PAIN DESCRIPTION - LOCATION
LOCATION: HIP

## 2025-07-04 ASSESSMENT — PAIN SCALES - GENERAL
PAINLEVEL_OUTOF10: 7
PAINLEVEL_OUTOF10: 5
PAINLEVEL_OUTOF10: 9

## 2025-07-04 ASSESSMENT — PAIN - FUNCTIONAL ASSESSMENT: PAIN_FUNCTIONAL_ASSESSMENT: PREVENTS OR INTERFERES SOME ACTIVE ACTIVITIES AND ADLS

## 2025-07-04 ASSESSMENT — PAIN DESCRIPTION - ORIENTATION: ORIENTATION: LEFT

## 2025-07-04 ASSESSMENT — PAIN DESCRIPTION - DESCRIPTORS: DESCRIPTORS: STABBING

## 2025-07-04 NOTE — PLAN OF CARE
Problem: Discharge Planning  Goal: Discharge to home or other facility with appropriate resources  7/3/2025 2208 by Agnes Holt RN  Outcome: Progressing  7/3/2025 1309 by Maritza Adkins RN  Flowsheets (Taken 7/3/2025 1309)  Discharge to home or other facility with appropriate resources: Identify barriers to discharge with patient and caregiver     Problem: Pain  Goal: Verbalizes/displays adequate comfort level or baseline comfort level  7/3/2025 2208 by Agnes Holt RN  Outcome: Progressing  7/3/2025 1309 by Maritza Adkins RN  Flowsheets (Taken 7/3/2025 1309)  Verbalizes/displays adequate comfort level or baseline comfort level:   Encourage patient to monitor pain and request assistance   Assess pain using appropriate pain scale   Administer analgesics based on type and severity of pain and evaluate response   Implement non-pharmacological measures as appropriate and evaluate response     Problem: Safety - Adult  Goal: Free from fall injury  7/3/2025 2208 by Agnes Holt RN  Outcome: Progressing  7/3/2025 1309 by Maritza Adkins RN  Flowsheets (Taken 7/3/2025 1309)  Free From Fall Injury: Instruct family/caregiver on patient safety     Problem: ABCDS Injury Assessment  Goal: Absence of physical injury  Outcome: Progressing     Problem: Nutrition Deficit:  Goal: Optimize nutritional status  Outcome: Progressing     Problem: Skin/Tissue Integrity  Goal: Skin integrity remains intact  Description: 1.  Monitor for areas of redness and/or skin breakdown  2.  Assess vascular access sites hourly  3.  Every 4-6 hours minimum:  Change oxygen saturation probe site  4.  Every 4-6 hours:  If on nasal continuous positive airway pressure, respiratory therapy assess nares and determine need for appliance change or resting period  Outcome: Progressing  Flowsheets (Taken 7/3/2025 2000)  Skin Integrity Remains Intact:   Monitor for areas of redness and/or skin breakdown   Assess vascular access  sites hourly   Every 4-6 hours minimum:  Change oxygen saturation probe site   Every 4-6 hours:  If on nasal continuous positive airway pressure, assess nares and determine need for appliance change or resting period   Turn and reposition as indicated   Assess need for specialty bed   Positioning devices   Pressure redistribution bed/mattress (bed type)   Check visual cues for pain   Monitor skin under medical devices

## 2025-07-04 NOTE — PROGRESS NOTES
Occupational Therapy  Facility/Department: [facility]  Rehabilitation Occupational Therapy Daily Treatment Note    Date: 25  Patient Name: Lexi Benitez       Room: 0152/0152-01  MRN: [id number]  Account: [id number]   : 1945  (79 y.o.) Gender: female                  Past Medical History:  has a past medical history of Anxiety, Dyslipidemia, Fibromyalgia, GERD (gastroesophageal reflux disease), Hypercholesterolemia, Osteopenia, and Pre-diabetes.  Past Surgical History:   has a past surgical history that includes Hysterectomy; Tonsillectomy; eye surgery; LASIK (10/23/2012); Upper gastrointestinal endoscopy (2013); Colonoscopy (); Colonoscopy (2013); Colonoscopy (2017); Upper gastrointestinal endoscopy (2017); Cholecystectomy, laparoscopic (N/A, 2017); Gallbladder surgery; Upper gastrointestinal endoscopy (N/A, 2022); Upper gastrointestinal endoscopy (2022); and hip surgery (Left, 2025).    Restrictions  Restrictions/Precautions: Weight Bearing, ROM Restrictions, Fall Risk, General Precautions  Hip Precautions: No hip flexion > 90 degrees, No ADduction, No hip internal rotation, Posterior hip precautions  Other Position/Activity Restrictions: WBAT LLE, posterior hip precautions LLE, up as tolerated, abductor pillow AAT while in bed  Right Lower Extremity Weight Bearing: Weight Bearing As Tolerated  Left Lower Extremity Weight Bearing: Weight Bearing As Tolerated    Subjective  Subjective: 2/10 pain, reports no concerns with d/c home, excited to return  Restrictions/Precautions: Weight Bearing;ROM Restrictions;Fall Risk;General Precautions             Objective     Cognition  Overall Cognitive Status: Exceptions  Arousal/Alertness: Appears intact  Following Commands: Appears intact  Attention Span: Appears intact  Memory: Appears intact  Safety Judgement: Decreased awareness of need for assistance  Problem Solving: Assistance required to generate

## 2025-07-04 NOTE — PROGRESS NOTES
Physical Therapy  Discharge Summary    Name:Lexi Benitez  MRN:[id number]  :1945  Treatment Diagnosis: impaired mobility  Diagnosis: fall at home    Restrictions/Precautions  Restrictions/Precautions: Weight Bearing, ROM Restrictions, Fall Risk, General Precautions  Activity Level: Up as Tolerated   Lower Extremity Weight Bearing Restrictions  Right Lower Extremity Weight Bearing: Weight Bearing As Tolerated  Left Lower Extremity Weight Bearing: Weight Bearing As Tolerated       Position Activity Restriction  Hip Precautions: No hip flexion > 90 degrees, No ADduction, No hip internal rotation, Posterior hip precautions  Other Position/Activity Restrictions: WBAT LLE, posterior hip precautions LLE, up as tolerated, abductor pillow AAT while in bed     Goals:                  Short Term Goals  Time Frame for Short Term Goals: 7 days, 25  Short Term Goal 1: pt will complete bed mobility with supv- GOAL MET , mod ind  Short Term Goal 2: pt will complete functional transfers with supv and LRAD- GOAL MET supv  Short Term Goal 3: pt will ambulate 150 ft with LRAD and SBA-G OAL MET with RW  Short Term Goal 4: pt will navigate curb step with LRAD and SBA- GOAL MET with RW            Long Term Goals  Time Frame for Long Term Goals : 11 days, 25  Long Term Goal 1: pt will complete bed mobility with ind - : not met, progressing (supervision to help maintain hip precautions but progressing to independence)  Long Term Goal 2: pt will complete functional transfer with mod ind and LRAD - MET   Long Term Goal 3: pt will ambulate 150 ft with LRAD and mod ind - MET   Long Term Goal 4: pt will navigate curb step with LRAD and mod ind - MET   Long Term Goal 5: pt will complete car transfer with mod ind and LRAD - : not met, progressing (supervision to ensure hip precautions maintained)    Pt. Met 4/4 short term goals and 3/5 long term goals.     Pt. Currently ambulates 150ft feet with RW and mod  I  Up/down 12 steps with B HR and mod I  Up/down curb step with RW with mod I  Sit to/from stand with RW with mod I  Bed mobility with supervision to help maintain hip precautions  Recommend RW for safe transfers and gait.  Pt. Safe to return home with PRN assistance from family and OPPT.   Provided pt. with RW.    Electronically signed by Dyan Montero PT on 7/4/2025 at 11:59 AM

## 2025-07-04 NOTE — PROGRESS NOTES
Occupational Therapy  Discharge Summary     Name:Lexi Benitez  MRN:[id number]  :1945  Treatment Diagnosis: impaired mobility  Diagnosis: fall at home    Restrictions/Precautions  Restrictions/Precautions: Weight Bearing, ROM Restrictions, Fall Risk, General Precautions  Activity Level: Up as Tolerated   Lower Extremity Weight Bearing Restrictions  Right Lower Extremity Weight Bearing: Weight Bearing As Tolerated  Left Lower Extremity Weight Bearing: Weight Bearing As Tolerated       Position Activity Restriction  Hip Precautions: No hip flexion > 90 degrees, No ADduction, No hip internal rotation, Posterior hip precautions  Other Position/Activity Restrictions: WBAT LLE, posterior hip precautions LLE, up as tolerated, abductor pillow AAT while in bed     Goals:   Patient Goals   Patient goals : \"to get home...and to be ok once I get there\"  Short Term Goals  Time Frame for Short Term Goals: 1 week- 25  Short Term Goal 1: Pt complete toileting with SBA. GOAL MET 25 Pt completed toileting with SPV.  Short Term Goal 2: Pt will perform functional transfers with SBA. GOAL MET 25 Pt performed functional transfers with SPV.  Short Term Goal 3: Pt will complete LB dressing with SBA. GOAL MET 25 Pt completed LB dressing with SPV.  Short Term Goal 4: Pt will perform FB bathing with SBA. GOAL MET 25 Pt performed FB bathing with SPV.  Short Term Goal 5: Pt will complete grooming at sink with SPV. GOAL MET 25 Pt completed grooming with SPV.  Long Term Goals  Time Frame for Long Term Goals : 11 days- 25  Long Term Goal 1: Pt will complete functional transfers with mod I.- MET 25  Long Term Goal 2: Pt will perform toileting with mod I. GOAL MET 7/3/25 Pt performed toileting with mod I.  Long Term Goal 3: Pt will complete LB dressing with mod I. GOAL MET 7/3/25 Pt completed LB dressing with mod I.  Long Term Goal 4: Pt will complete FB bathing with SPV. GOAL MET 25 Pt completed FB

## 2025-07-04 NOTE — PROGRESS NOTES
Goal 3: pt will ambulate 150 ft with LRAD and SBA-G OAL MET with RW  Short Term Goal 4: pt will navigate curb step with LRAD and SBA- GOAL MET with RW  Long Term Goals  Time Frame for Long Term Goals : 11 days, 7/05/25  Long Term Goal 1: pt will complete bed mobility with ind - 7/4: not met, progressing (supervision to help maintain hip precautions but progressing to independence)  Long Term Goal 2: pt will complete functional transfer with mod ind and LRAD - MET 7/4  Long Term Goal 3: pt will ambulate 150 ft with LRAD and mod ind - MET 7/4  Long Term Goal 4: pt will navigate curb step with LRAD and mod ind - MET 7/4  Long Term Goal 5: pt will complete car transfer with mod ind and LRAD - 7/4: not met, progressing (supervision to ensure hip precautions maintained)    PLAN OF CARE/SAFETY  Physical Therapy Plan  General Plan: 5-7 times per week  Current Treatment Recommendations: Strengthening;ROM;Balance training;Functional mobility training;Transfer training;Endurance training;Gait training;Home exercise program;Safety education & training;Equipment evaluation, education, & procurement;Therapeutic activities;Stair training;Wheelchair mobility training;Positioning;Neuromuscular re-education;Patient/Caregiver education & training;Pain management  Safety Devices  Type of Devices: All fall risk precautions in place;Call light within reach;Gait belt;Patient at risk for falls;Nurse notified;All tylor prominences offloaded;Left in chair;Chair alarm in place    EDUCATION  Education  Education Given To: Patient  Education Provided: Role of Therapy;Mobility Training;Plan of Care;Precautions;Safety;Equipment;Transfer Training;Fall Prevention Strategies  Education Provided Comments: Role of PT in ARU, safety with transfers/gait using RW, WB status and posterior hip px for LLE.  Education Method: Demonstration;Verbal  Barriers to Learning: None  Education Outcome: Verbalized understanding;Demonstrated understanding;Continued

## 2025-07-04 NOTE — PLAN OF CARE
Problem: Discharge Planning  Goal: Discharge to home or other facility with appropriate resources  Outcome: Progressing  Flowsheets (Taken 7/4/2025 1219)  Discharge to home or other facility with appropriate resources:   Identify barriers to discharge with patient and caregiver   Arrange for needed discharge resources and transportation as appropriate   Identify discharge learning needs (meds, wound care, etc)   Arrange for interpreters to assist at discharge as needed   Refer to discharge planning if patient needs post-hospital services based on physician order or complex needs related to functional status, cognitive ability or social support system     Problem: Pain  Goal: Verbalizes/displays adequate comfort level or baseline comfort level  Outcome: Progressing  Flowsheets (Taken 7/3/2025 1309 by Maritza Adkins, RN)  Verbalizes/displays adequate comfort level or baseline comfort level:   Encourage patient to monitor pain and request assistance   Assess pain using appropriate pain scale   Administer analgesics based on type and severity of pain and evaluate response   Implement non-pharmacological measures as appropriate and evaluate response     Problem: Safety - Adult  Goal: Free from fall injury  Outcome: Progressing  Flowsheets (Taken 7/4/2025 1219)  Free From Fall Injury: Instruct family/caregiver on patient safety     Problem: ABCDS Injury Assessment  Goal: Absence of physical injury  Outcome: Progressing  Flowsheets (Taken 7/4/2025 1219)  Absence of Physical Injury: Implement safety measures based on patient assessment     Problem: Nutrition Deficit:  Goal: Optimize nutritional status  Outcome: Progressing  Flowsheets (Taken 7/2/2025 1327 by Luz Sorto, MS, RD, LD)  Nutrient intake appropriate for improving, restoring, or maintaining nutritional needs:   Assess nutritional status and recommend course of action   Monitor oral intake, labs, and treatment plans   Recommend appropriate diets, oral nutritional

## 2025-07-04 NOTE — PROGRESS NOTES
Physician Progress Note      PATIENT:               EFREN DOWELL  [facility] #:                  [id number]  :                       1945  ADMIT DATE:       2025 6:29 PM  DISCH DATE:        2025 5:59 PM  RESPONDING  PROVIDER #:        Marcello UPTON MD          QUERY TEXT:    Acute cystitis is documented in the medical record  note. Please   provide additional clinical indicators supportive of your documentation. Or   please document if the diagnosis of acute cystitis has been ruled out after   study.    The clinical indicators include:  This is a 79 y.o female with pMHx of UTI, GERD.   - IM - Acute cystitis: Continue on IV antibiotics with ceftriaxone. Will   discontinue Forde catheter today.   - IM DC - Acute cystitis: Has been on IV antibiotics with ceftriaxone and   has now completed course of treatment and antibiotics dc'd.  This was treated with UA, IV antibiotics, Forde catheter  Options provided:  -- UTI was ruled out after study  -- UTI present as evidenced by, Please document evidence.  -- Other - I will add my own diagnosis  -- Disagree - Not applicable / Not valid  -- Disagree - Clinically unable to determine / Unknown  -- Refer to Clinical Documentation Reviewer    PROVIDER RESPONSE TEXT:    UTI is present as evidenced by: Urinalysis with WBCs present 3+ bacteria   present.Treated with IV antibiotics completed course of treatment    Query created by: Richard Arellano on 2025 8:54 AM      Electronically signed by:  Marcello UPTON MD 2025 8:14 AM

## 2025-07-05 VITALS
DIASTOLIC BLOOD PRESSURE: 67 MMHG | RESPIRATION RATE: 17 BRPM | BODY MASS INDEX: 21.77 KG/M2 | TEMPERATURE: 98.3 F | OXYGEN SATURATION: 97 % | HEIGHT: 67 IN | SYSTOLIC BLOOD PRESSURE: 137 MMHG | WEIGHT: 138.7 LBS | HEART RATE: 83 BPM

## 2025-07-05 PROCEDURE — 6370000000 HC RX 637 (ALT 250 FOR IP): Performed by: STUDENT IN AN ORGANIZED HEALTH CARE EDUCATION/TRAINING PROGRAM

## 2025-07-05 PROCEDURE — 6360000002 HC RX W HCPCS: Performed by: STUDENT IN AN ORGANIZED HEALTH CARE EDUCATION/TRAINING PROGRAM

## 2025-07-05 RX ADMIN — ENOXAPARIN SODIUM 40 MG: 100 INJECTION SUBCUTANEOUS at 09:16

## 2025-07-05 RX ADMIN — Medication 2000 UNITS: at 09:16

## 2025-07-05 RX ADMIN — CYANOCOBALAMIN TAB 500 MCG 500 MCG: 500 TAB at 09:16

## 2025-07-05 RX ADMIN — OXYCODONE AND ACETAMINOPHEN 2 TABLET: 5; 325 TABLET ORAL at 09:16

## 2025-07-05 RX ADMIN — OXYCODONE AND ACETAMINOPHEN 1 TABLET: 5; 325 TABLET ORAL at 03:50

## 2025-07-05 RX ADMIN — PANTOPRAZOLE SODIUM 40 MG: 40 TABLET, DELAYED RELEASE ORAL at 06:14

## 2025-07-05 ASSESSMENT — PAIN SCALES - GENERAL: PAINLEVEL_OUTOF10: 9

## 2025-07-05 ASSESSMENT — PAIN DESCRIPTION - PAIN TYPE: TYPE: ACUTE PAIN;SURGICAL PAIN

## 2025-07-05 ASSESSMENT — PAIN DESCRIPTION - ORIENTATION: ORIENTATION: LEFT

## 2025-07-05 ASSESSMENT — PAIN DESCRIPTION - LOCATION: LOCATION: HIP

## 2025-07-05 ASSESSMENT — PAIN DESCRIPTION - ONSET: ONSET: ON-GOING

## 2025-07-05 ASSESSMENT — PAIN DESCRIPTION - DESCRIPTORS: DESCRIPTORS: ACHING

## 2025-07-05 ASSESSMENT — PAIN - FUNCTIONAL ASSESSMENT: PAIN_FUNCTIONAL_ASSESSMENT: PREVENTS OR INTERFERES SOME ACTIVE ACTIVITIES AND ADLS

## 2025-07-05 ASSESSMENT — PAIN DESCRIPTION - FREQUENCY: FREQUENCY: CONTINUOUS

## 2025-07-05 NOTE — PROGRESS NOTES
ACUTE REHAB UNIT: DISCHARGE  [facility]    Patient:Lexi Benitez     Rehab Dx/Hx: Closed left hip fracture, initial encounter (HCC) [S72.002A]   :1945  MRN:[id number]  Date of Admit: 2025   Date of Discharge: 2025    Subjective:   Order for patient discharge.  Reviewed discharge summary (AVS) with patient including medications, physical instructions (safety, medication administration) and diet. Pt in stable condition.     Reconciled Medication List - Patient:   Medications were reviewed by RN at time of discharge with patient and/or family:  []  Via EMR   []  Via health information exchange  [x]  Verbal (e.g. in person, telephone, video conferencing)  [x]  Paper-based (e.g. fax, copies, printouts)   []  Other Methods (e.g. texting, email, CDs)    Reconciled Medication List - Subsequent provider:   [] No, current reconciled medication list not provided to the subsequent provider.  [] Yes, current reconciled medication list provided to the subsequent provider.   [] Via EMR    [] Via health information exchange  [x] Verbal (e.g. in person, telephone, video conferencing)  [x] Paper-based (e.g. fax, copies, printouts)   [] Other Methods (e.g. texting, email, CDs)    Discharge disposition:  Pt was discharged via:  [x]  Wheelchair  []  Stretcher  []  Safe ambulation and use of assistive device  []  Other:     Pt was discharged to:  [x]  Private car  []  Transportation service to next destination  []  Other:     Discharge destination:  [x]  Home  []  Skilled Nursing Facility  []  Long Term Care  []  Other:        Discharge BIMS:15  Number of word repeated after first attempt:  []  0. None []  1. One []  2. Two [x]  3. Three    Able to report correct year:  []  0. Missed by >5 years, or no answer  []  1. Missed by 2-5 years  []  2. Missed by 1 year  []  3. Correct    Able to report correct month:   []  0. Missed by >1 month, or no answer  []  1. Missed by 6 days to 1 month  [x]  2.  [Negative] : Genitourinary

## 2025-07-05 NOTE — PLAN OF CARE
Problem: Discharge Planning  Goal: Discharge to home or other facility with appropriate resources  7/4/2025 2334 by Britney Ireland, RN  Outcome: Progressing  7/4/2025 1219 by Arcelia Alberto, RN  Outcome: Progressing  Flowsheets (Taken 7/4/2025 1219)  Discharge to home or other facility with appropriate resources:   Identify barriers to discharge with patient and caregiver   Arrange for needed discharge resources and transportation as appropriate   Identify discharge learning needs (meds, wound care, etc)   Arrange for interpreters to assist at discharge as needed   Refer to discharge planning if patient needs post-hospital services based on physician order or complex needs related to functional status, cognitive ability or social support system     Problem: Pain  Goal: Verbalizes/displays adequate comfort level or baseline comfort level  7/4/2025 1219 by Arcelia Alberto, RN  Outcome: Progressing  Flowsheets (Taken 7/3/2025 1309 by Maritza Adkins, RN)  Verbalizes/displays adequate comfort level or baseline comfort level:   Encourage patient to monitor pain and request assistance   Assess pain using appropriate pain scale   Administer analgesics based on type and severity of pain and evaluate response   Implement non-pharmacological measures as appropriate and evaluate response     Problem: Safety - Adult  Goal: Free from fall injury  7/4/2025 2334 by Britney Ireland RN  Outcome: Progressing  7/4/2025 1219 by Arcelia Alberto, RN  Outcome: Progressing  Flowsheets (Taken 7/4/2025 1219)  Free From Fall Injury: Instruct family/caregiver on patient safety

## 2025-07-05 NOTE — PROGRESS NOTES
Lexi CLEMENT Emmanuel  7/5/2025  [id number]    Chief Complaint: Closed left hip fracture, initial encounter ([facility])    Subjective:   Patient seen and examined. Denies concerns or complaints. Denies symptoms of pain. Doing well.       No new labs. No need to further trend.     ROS: denies f/c, n/v, cp, sob    Objective:  Patient Vitals for the past 24 hrs:   BP Temp Temp src Pulse Resp SpO2   07/05/25 0916 -- -- -- -- 17 --   07/05/25 0910 137/67 98.3 °F (36.8 °C) Oral 83 17 97 %   07/04/25 2150 -- -- -- -- 16 --   07/04/25 2115 118/70 98.2 °F (36.8 °C) Oral 90 16 97 %     Gen: No distress, pleasant.   HEENT: Normocephalic, atraumatic.   CV: RRR  Resp: No respiratory distress. Lungs CTAB  Abd: Soft, nondistended  Ext: No edema.   Neuro: Alert, oriented, appropriately interactive. Speech fluent  Psych: appropriate mood and affect    Wt Readings from Last 3 Encounters:   06/25/25 62.9 kg (138 lb 11.2 oz)   06/21/25 63 kg (139 lb)   12/03/24 64.5 kg (142 lb 3.2 oz)       Laboratory data:   Lab Results   Component Value Date    WBC 5.5 06/30/2025    HGB 10.4 (L) 06/30/2025    HCT 30.5 (L) 06/30/2025    MCV 88.5 06/30/2025     06/30/2025       Lab Results   Component Value Date/Time     06/30/2025 06:19 AM    K 4.1 06/30/2025 06:19 AM     06/30/2025 06:19 AM    CO2 25 06/30/2025 06:19 AM    BUN 12 06/30/2025 06:19 AM    CREATININE 0.6 06/30/2025 06:19 AM    GLUCOSE 106 06/30/2025 06:19 AM    CALCIUM 9.0 06/30/2025 06:19 AM        Therapy progress:  Physical therapy:  Bed Mobility:  Additional Factors: Head of bed raised, Increased time to complete, Verbal cues (verbal cues for L hip flex < 90 during bed mobility)  Sit>supine:  Assistance Level: Supervision (progressing to independence)  Skilled Clinical Factors: flat bed, no rails  Supine>sit:  Assistance Level: Supervision (progressing to independence)  Skilled Clinical Factors: flat bed, no rails  Transfers:  Surface: From chair with arms, From bed  Additional  Factors: Verbal cues  Device: Walker (RW)  Sit>stand:  Assistance Level: Modified independent  Skilled Clinical Factors: from recliner and bed to RW, pt with safe hand placement without cues necessary  Stand>sit:  Assistance Level: Modified independent  Skilled Clinical Factors: pt with safe hand placement without cues necessary  Bed<>chair  Technique: Stand pivot  Assistance Level: Modified independent  Skilled Clinical Factors: bed <> recliner at RW  Stand Pivot:     Lateral transfer:     Car transfer:  Assistance Level: Supervision  Skilled Clinical Factors: supervision to encuare hip flex <90 deg with review of precautions  Ambulation:  Surface: Level surface  Device: Rolling walker  Distance: 100 ft + 25 ft  Activity: Within Room, Within Unit  Activity Comments: Ambulating at  on uneven surface (CobbleFoam) x 15 feet with supervision.  Additional Factors: Set-up, Verbal cues  Assistance Level: Modified independent  Gait Deviations: Slow marlin, Decreased weight shift left, Decreased heel strike left  Skilled Clinical Factors: Slight antalgic gait/decreased stance time on LLE vs. RLE although this normalized with repetition.  Steady using RW without LOB.  Stairs:  Stair Height: 6'', 4''  Device: Bilateral handrails  Number of Stairs: 12  Additional Factors: Non-reciprocal going up, Non-reciprocal going down, Increased time to complete  Assistance Level: Modified independent  Skilled Clinical Factors - Comments: step to pattern  Curb:  Curb Height: 6''  Device: Rolling walker  Number of Curbs: 2  Additional Factors: Non-reciprocal going up, Non-reciprocal going down (Ascend leading with RLE, and Descending leading with LLE)  Assistance Level: Modified independent  Wheelchair:  Surface: Level surface  Device: Standard wheelchair  Additional Factors: Verbal cues, Increased time to complete  Assistance Required to Manage Parts:  (no assist but VC reminders regarding brakes)  Assistance Level for Propulsion:

## 2025-07-05 NOTE — PROGRESS NOTES
IRF VITA Discharge Assessment  [facility] Acute Rehab Unit    Patient:Lexi Benitez     Rehab Dx/Hx: Closed left hip fracture, initial encounter ([facility]) [S72.002A]   :1945  MRN:[id number]  Date of Admit: 2025     Pain Effect on Sleep:  Over the past 5 days, how much of the time has pain made it hard for you to sleep at night?  []  0. Does not apply - I have not had any pain or hurting in the past 5 days  [x]  1. Rarely or not at all  []  2. Occasionally  []  3. Frequently  []  4. Almost constantly  []  8. Unable to answer    Over the past 5 days, how often have you limited your participation in rehabilitation therapy sessions due to pain?  []  0. Does not apply - I have not received rehabilitation therapy in the past 5 days  [x]  1. Rarely or not at all  []  2. Occasionally  []  3. Frequently  []  4. Almost constantly  []  8. Unable to answer    Pain Interference with Day-to-Day Activities:  Over the past 5 days, how often have you limited your day-to-day activities (excluding rehabilitation therapy session)?  [x]  1. Rarely or not at all  []  2. Occasionally  []  3. Frequently  []  4. Almost constantly  []  8. Unable to answer      High-Risk Drug Classes: Use and Indication   Is taking: Check if the pt is taking any medications by pharmacological classification  Indication noted: If column 1 is checked, check if there is an indication noted for all meds in the drug class Is taking  (check all that apply) Indication noted (check all that apply)   Antipsychotic [] []   Anticoagulant [x] [x]   Antibiotic [] []   Opioid [] []   Antiplatelet [x] [x]   Hypoglycemic (including insulin) [] []   None of the above [] []     Special Treatments, Procedures, and Programs   Check all of the following treatments, procedures, and programs that apply on discharge.  On discharge (check all that apply)   Cancer Treatments    A1. Chemotherapy []   A2. IV []   A3. Oral []   A10. Other []   B1. Radiation []   Respiratory  Therapies    C1. Oxygen Therapy []   C2. Continuous []   C3. Intermittent []   C4. High-concentration []   D1. Suctioning []   D2. Scheduled []   D3. As needed []   E1. Tracheostomy Care []   F1. Invasive Mechanical Ventilator (ventilator or respirator) []   G1. Non-invasive Mechanical Ventilator []   G2. BiPAP []   G3. CPAP []   Other    H1. IV Medications []   H2. Vasoactive medications []   H3. Antibiotics []   H4. Anticoagulation []   H10. Other []   I1. Transfusions []   J1. Dialysis []   J2. Hemodialysis []   J3. Peritoneal dialysis []   O1. IV access []   O2. Peripheral []   O3. Midline []   O4. Central (PICC, tunneled, port) []   None of the above []     Signature:  Whitney PARHAM RN, MSN

## 2025-07-07 NOTE — DISCHARGE SUMMARY
Physical Medicine & Rehabilitation  Discharge Summary     Patient Identification:  Lexi Benitez  : 1945  Admit date: 2025  Discharge date: 2025   Attending provider: Loida Lea MD      Primary care provider: Geoffrey Caceres DO     Discharge Diagnoses:   Active Hospital Problems    Diagnosis     Closed left hip fracture, initial encounter ([facility]) [S72.002A]        History of Present Illness/Acute Hospital Course:  Lexi Benitez is a 79 year old female with a past medical history significant for fibromyalgia, HLD, anxiety, pre-diabetes, and GERD who presented to [facility] on 25 with left hip pain after a fall, found to have left hip fracture. Orthopedic Surgery was consulted and on  she underwent ORIF with prosthetic replacement. She is WBAT with posterior hip precautions. She was admitted to [facility] on 25 due to functional deficits below her baseline. Today Lexi is seen in her room without family present. She reports left hip pain, but reports that pain medications are helping. She reports chronic constipation and small hard stools for which she has been evaluated at the [facility]. She notes being independent at baseline. She lives with her  in a single level house with a threshold to enter. She is an active . She is retired from working in .     Inpatient Rehabilitation Course:   Lexi Benitez is a 79 y.o. female admitted to inpatient rehabilitation on 2025 with Closed left hip fracture, initial encounter ([facility]).  The patient participated in an aggressive multidisciplinary inpatient rehabilitation program involving 3 hours of therapy per day, at least 5 days per week. Discharging home with family. OP PT/OT and DME ordered. Patient will follow-up with PCP, Orthopedic Surgery.     Impairments: impaired mobility and ADLs, impaired gait and balance    Medical Management:    Left hip fracture s/p ORIF with prosthetic replacement   - WBAT with posterior hip

## 2025-07-10 ENCOUNTER — OFFICE VISIT (OUTPATIENT)
Dept: ORTHOPEDIC SURGERY | Age: 80
End: 2025-07-10

## 2025-07-10 VITALS — OXYGEN SATURATION: 99 % | WEIGHT: 138 LBS | HEART RATE: 85 BPM | HEIGHT: 67 IN | BODY MASS INDEX: 21.66 KG/M2

## 2025-07-10 DIAGNOSIS — Z96.649 S/P HIP HEMIARTHROPLASTY: Primary | ICD-10-CM

## 2025-07-10 DIAGNOSIS — M25.552 LEFT HIP PAIN: Primary | ICD-10-CM

## 2025-07-10 DIAGNOSIS — Z96.649 S/P HIP HEMIARTHROPLASTY: ICD-10-CM

## 2025-07-10 DIAGNOSIS — L89.159 PRESSURE INJURY OF SKIN OF SACRAL REGION, UNSPECIFIED INJURY STAGE: ICD-10-CM

## 2025-07-10 PROCEDURE — 99024 POSTOP FOLLOW-UP VISIT: CPT | Performed by: ORTHOPAEDIC SURGERY

## 2025-07-10 RX ORDER — HYDROCODONE BITARTRATE AND ACETAMINOPHEN 5; 325 MG/1; MG/1
1 TABLET ORAL EVERY 6 HOURS PRN
Qty: 10 TABLET | Refills: 0 | Status: SHIPPED | OUTPATIENT
Start: 2025-07-10 | End: 2025-07-17

## 2025-07-10 NOTE — PROGRESS NOTES
[facility] PHYSICIANS [place] SPECIALTY CARE [facility] ORTHOPEDIC AND SPORTS MEDICINE [place], [place] [address]  Dept: [phone]  Loc: [phone]    Ambulatory Orthopedic Postoperative Visit     Preoperative Diagnosis:   Left closed displaced femoral neck fracture  History of fibromyalgia  History of impaired fasting glucose  History of osteopenia     Postoperative Diagnosis:   same     Procedures Performed:  (6/22/2025)  Open treatment of Left displaced femoral neck fracture with prosthetic replacement       SUBJECTIVE:     The patient returns post op from the above stated procedure. Reports doing well overall, reports improved pain, denies wound drainage/issues, fevers/chills/night sweats, calf swelling/pain, chest pain, shortness of breath. At today's visit, she is using a walker.    She is here today with her .     OBJECTIVE:    Pulse 85   Ht 1.702 m (5' 7\")   Wt 62.6 kg (138 lb)   SpO2 99%   BMI 21.61 kg/m²    NAD, resting comfortably  Incisions clean/dry/intact, no erythema/dehiscence/drainage  Sensation to light touch grossly intact throughout  Warm and well perfused  Grossly neurovascularly intact distally, good ankle plantarflexion/dorsiflexion/EHL strength   No signs of infection  No calf swelling/tenderness  -Superficial sacral decubitus ulcer       RADIOLOGY:   7/10/2025 FINDINGS:  One view (AP Pelvis) of the pelvis was obtained in the office today and reviewed, revealing well-seated intact hemiarthroplasty without evidence of loosening.  No interval displacement.  Small bit of comminution medially at the proximal femur, unchanged to postop films in the hospital.    IMPRESSION: Status post hip hemiarthroplasty as above    Electronically signed by Dung Linares MD         ASSESSMENT AND PLAN:Assessment & Plan     2 weeks s/p above, doing well overall   -At her visit on 7/10/2025, we reviewed and discussed the  Patient is calling and would like a script for a new pair of orthopedic shoes. It has been about 3 years since she had a new pair. Please help advise on how to help patient.

## 2025-07-10 NOTE — TELEPHONE ENCOUNTER
Requested Prescriptions     Pending Prescriptions Disp Refills    HYDROcodone-acetaminophen (NORCO) 5-325 MG per tablet 10 tablet 0     Sig: Take 1 tablet by mouth every 6 hours as needed for Pain for up to 7 days. Do not exceed 3000 mg of Acetaminophen in 24 hrs. Max Daily Amount: 4 tablets

## 2025-07-15 ENCOUNTER — HOSPITAL ENCOUNTER (OUTPATIENT)
Dept: PHYSICAL THERAPY | Age: 80
Setting detail: THERAPIES SERIES
Discharge: HOME | End: 2025-07-15
Payer: MEDICARE

## 2025-07-15 PROCEDURE — 97110 THERAPEUTIC EXERCISES: CPT

## 2025-07-15 PROCEDURE — 97112 NEUROMUSCULAR REEDUCATION: CPT

## 2025-07-15 PROCEDURE — 97161 PT EVAL LOW COMPLEX 20 MIN: CPT

## 2025-07-15 NOTE — PLAN OF CARE
[facility] - Outpatient Rehabilitation and Therapy: [address], [place] [address] office: [phone] fax: [phone]     Physical Therapy Initial Evaluation Certification      Dear Dung Lianres MD ,    We had the pleasure of evaluating the following patient for physical therapy services at [facility].  A summary of our findings can be found in the initial assessment below.  This includes our plan of care.  If you have any questions or concerns regarding these findings, please do not hesitate to contact me at the office phone number listed above.  Thank you for the referral.     Physician Signature:_______________________________Date:__________________  By signing above (or electronic signature), therapist’s plan is approved by physician       Physical Therapy: TREATMENT/PROGRESS NOTE   Patient: Lexi Benitez (79 y.o. female)   Examination Date: 07/15/2025   :  1945 MRN: [id number]   Visit #: 1   Insurance Allowable Auth Needed   ? []Yes    []No    Insurance: Payor: MEDICARE / Plan: MEDICARE PART A AND B / Product Type: *No Product type* /   Insurance ID: [id number] - (Medicare)  Secondary Insurance (if applicable): [place] BCBS   Treatment Diagnosis: L hip pain, s/p L TEN DOS 25  No diagnosis found.   Medical Diagnosis:  Left hip pain [M25.552]   Referring Physician: Dung Linares MD  PCP: Geoffrey Caceres DO     Plan of care signed (Y/N):     Date of Patient follow up with Physician:      Plan of Care Report: EVAL today and YES, Date Range for this report: 7/15/25 to 10/15/25  POC update due: (10 visits /OR AUTH LIMITS, whichever is less)  2025                                             Medical History:  Comorbidities:  Osteoarthritis  Relevant Medical History: na                                         Precautions/ Contra-indications:           Latex allergy:  NO  Pacemaker:    NO  Contraindications for

## 2025-07-17 ENCOUNTER — TELEPHONE (OUTPATIENT)
Dept: ORTHOPEDIC SURGERY | Age: 80
End: 2025-07-17

## 2025-07-17 DIAGNOSIS — Z96.649 S/P HIP HEMIARTHROPLASTY: Primary | ICD-10-CM

## 2025-07-17 PROBLEM — I70.0 ATHEROSCLEROSIS OF ABDOMINAL AORTA: Status: ACTIVE | Noted: 2025-02-04

## 2025-07-17 PROBLEM — Z80.0 FAMILY HISTORY OF LIVER CANCER: Status: RESOLVED | Noted: 2018-01-09 | Resolved: 2025-07-17

## 2025-07-17 PROBLEM — D64.9 NORMOCYTIC ANEMIA: Status: ACTIVE | Noted: 2025-07-17
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
## 2025-07-17 PROBLEM — R31.0 GROSS HEMATURIA: Status: RESOLVED | Noted: 2024-11-08 | Resolved: 2025-07-17

## 2025-07-17 PROBLEM — R31.0 GROSS HEMATURIA: Status: ACTIVE | Noted: 2024-11-08

## 2025-07-17 PROBLEM — M51.369 DDD (DEGENERATIVE DISC DISEASE), LUMBAR: Status: ACTIVE | Noted: 2019-07-01

## 2025-07-17 PROBLEM — R06.02 SOB (SHORTNESS OF BREATH): Status: RESOLVED | Noted: 2022-09-05 | Resolved: 2025-07-17

## 2025-07-17 PROBLEM — Z83.49 FAMILY HISTORY OF GRAVES' DISEASE: Status: RESOLVED | Noted: 2018-01-09 | Resolved: 2025-07-17

## 2025-07-17 PROBLEM — R07.9 CHEST PAIN: Status: RESOLVED | Noted: 2022-09-06 | Resolved: 2025-07-17

## 2025-07-17 PROBLEM — H04.123 DRY EYE SYNDROME, BILATERAL: Status: ACTIVE | Noted: 2025-07-17

## 2025-07-17 PROBLEM — R92.8 ABNORMAL MAMMOGRAM: Status: RESOLVED | Noted: 2025-07-17 | Resolved: 2025-07-17

## 2025-07-17 PROBLEM — M15.0 PRIMARY OSTEOARTHRITIS INVOLVING MULTIPLE JOINTS: Status: ACTIVE | Noted: 2019-07-01

## 2025-07-17 PROBLEM — R92.8 ABNORMAL MAMMOGRAM: Status: ACTIVE | Noted: 2025-07-17

## 2025-07-17 RX ORDER — HYDROCODONE BITARTRATE AND ACETAMINOPHEN 5; 325 MG/1; MG/1
1 TABLET ORAL EVERY 6 HOURS PRN
Qty: 10 TABLET | Refills: 0 | Status: SHIPPED | OUTPATIENT
Start: 2025-07-17 | End: 2025-07-24

## 2025-07-17 NOTE — TELEPHONE ENCOUNTER
General Question     Subject: NEW MEDICATION REQUEST   Patient and /or Facility Request: Lexi Benitez   Contact Number: [phone]        PATIENT REQUESTING NEW MEDICATION STATES THE TYLENOL IS NOT WORKING FOR HER AND NEEDS SOMETHING JUST A LITTLE STRONGER THAT TYLENOL    PLEASE CALL PATIENT AT THE NUMBER ABOVE     PLEASE SEND ANY MEDICATION TO THE PHARMACY BELOW     [address] -  [phone] - f [phone]

## 2025-07-17 NOTE — DISCHARGE INSTRUCTIONS
Wound Care Center Physician Orders and Discharge Instructions  [place] [address]", [address]  [place] [id number]  Telephone: [phone]      Fax: [phone]      Your home care company:   .    Your wound-care supplies will be provided by: We are ordering your wound-care supplies from [employer]. Please note, depending on your insurance coverage, you may have out-of-pocket expenses for these supplies. Someone from [employer] may call you to confirm your order and discuss those potential costs before they ship your products -- please anticipate that call. If your out-of-pocket cost is going to be less than $150, and [employer] cannot reach you, they may ship your supplies as soon as the order is processed, and will send you a bill. If your out-of-pocket cost is going to be more than $150, [employer] should not ship your supplies until they speak with you. If you have any questions about your supplies or your potential out-of-pocket costs, or if you need to place an order for a refill of supplies (typically monthly), [employer] can be reached at [phone]. Information on [employer] return policy will also be enclosed with your supplies -- please read that carefully before opening your items.       NAME:  Lexi Benitez   YOB: 1945  PRIMARY DIAGNOSIS FOR WOUND CARE CENTER:  Pressure Ulcer .    Wound cleansing:   Do not scrub or use excessive force.  Wash hands with soap and water before and after dressing changes.  Prior to applying a clean dressing, cleanse wound with normal saline, wound cleanser, or mild soap and water. Ask your physician or nurse before getting the wound(s) wet in the shower.     Wound care for home:    Coccyx - 7/18/25 - Please show patient spouse how to apply dressings  Wash wound with gentle soap and water. Pat dry.   Triad to wound and wound edges - sample given to patient today  Hydrocolloid dressing  Change as

## 2025-07-18 ENCOUNTER — HOSPITAL ENCOUNTER (OUTPATIENT)
Dept: WOUND CARE | Age: 80
Discharge: HOME | End: 2025-07-18
Attending: INTERNAL MEDICINE
Payer: MEDICARE

## 2025-07-18 VITALS
RESPIRATION RATE: 18 BRPM | SYSTOLIC BLOOD PRESSURE: 146 MMHG | TEMPERATURE: 98.4 F | HEIGHT: 67 IN | WEIGHT: 131.6 LBS | DIASTOLIC BLOOD PRESSURE: 77 MMHG | HEART RATE: 86 BPM | BODY MASS INDEX: 20.65 KG/M2

## 2025-07-18 DIAGNOSIS — R73.03 PRE-DIABETES: Primary | ICD-10-CM

## 2025-07-18 DIAGNOSIS — E78.5 DYSLIPIDEMIA: ICD-10-CM

## 2025-07-18 DIAGNOSIS — D64.9 NORMOCYTIC ANEMIA: ICD-10-CM

## 2025-07-18 DIAGNOSIS — L89.152 PRESSURE ULCER OF COCCYGEAL REGION, STAGE 2 (HCC): ICD-10-CM

## 2025-07-18 PROCEDURE — 99214 OFFICE O/P EST MOD 30 MIN: CPT

## 2025-07-18 RX ORDER — BACITRACIN ZINC AND POLYMYXIN B SULFATE 500; 1000 [USP'U]/G; [USP'U]/G
OINTMENT TOPICAL PRN
OUTPATIENT
Start: 2025-07-18

## 2025-07-18 RX ORDER — LIDOCAINE 50 MG/G
OINTMENT TOPICAL PRN
OUTPATIENT
Start: 2025-07-18

## 2025-07-18 RX ORDER — LIDOCAINE HYDROCHLORIDE 40 MG/ML
SOLUTION TOPICAL PRN
OUTPATIENT
Start: 2025-07-18

## 2025-07-18 RX ORDER — TRIAMCINOLONE ACETONIDE 1 MG/G
OINTMENT TOPICAL PRN
OUTPATIENT
Start: 2025-07-18

## 2025-07-18 RX ORDER — LIDOCAINE 40 MG/G
CREAM TOPICAL PRN
OUTPATIENT
Start: 2025-07-18

## 2025-07-18 ASSESSMENT — PAIN SCALES - GENERAL: PAINLEVEL_OUTOF10: 9

## 2025-07-18 ASSESSMENT — PAIN DESCRIPTION - PAIN TYPE: TYPE: ACUTE PAIN

## 2025-07-18 ASSESSMENT — PAIN SCALES - WONG BAKER: WONGBAKER_NUMERICALRESPONSE: NO HURT

## 2025-07-18 ASSESSMENT — PAIN - FUNCTIONAL ASSESSMENT: PAIN_FUNCTIONAL_ASSESSMENT: PREVENTS OR INTERFERES SOME ACTIVE ACTIVITIES AND ADLS

## 2025-07-18 ASSESSMENT — PAIN DESCRIPTION - DESCRIPTORS: DESCRIPTORS: PENETRATING

## 2025-07-18 ASSESSMENT — PAIN DESCRIPTION - LOCATION: LOCATION: BUTTOCKS

## 2025-07-18 NOTE — PLAN OF CARE
Wound Care Supplies      Supply Company:      [facility] [address] [facility] [address] [place] [address]  p: [phone] f: [phone]    Ordering Center:     [facility]  [address]  [place] [address]  [phone]  Dept: [phone]   Fax# [phone]    Patient Information:      Efren Dowell  [address]  [place] [address]   [phone]   : 1945  AGE: 79 y.o.     GENDER: female   TODAYS DATE:  2025    Insurance:      PRIMARY INSURANCE:  Plan: MEDICARE PART A AND B  Coverage: MEDICARE  Effective Date: 2015  Group Number: [unfilled]  Subscriber Number: [id number] - (Medicare)    Payer/Plan Subscr  Sex Relation Sub. Ins. ID Effective Group Num   1. [place] - [place]* EFREN DOWELL 1945 Female Self [id number] 1/1/15                                    [address] [address]   2. [place] EFREN DOWELL 1945 Female Self [id number] 12/3/24 [id number]                                   [address], [facility] [address]         Patient Wound Information:     Additional ICD-10 Codes:     Patient Active Problem List   Diagnosis Code    GERD (gastroesophageal reflux disease) K21.9    Fibromyalgia M79.7    Anxiety F41.9    Insomnia G47.00    Osteopenia M85.80    Vitamin D deficiency E55.9    Hypercholesterolemia E78.00    Lumbar facet arthropathy M47.816    Fall at home, sequela W19.XXXS, Y92.009    Closed left hip fracture, initial encounter ([facility]) S72.002A    Pre-diabetes R73.03    Atherosclerosis of abdominal aorta I70.0    DDD (degenerative disc disease), lumbar M51.369    Dry eye syndrome, bilateral H04.123    Primary osteoarthritis involving multiple joints M15.0    Normocytic anemia D64.9       WOUNDS REQUIRING DRESSING SUPPLIES:     Wound 25 #1, sacrum, pressure ulcer stage 1, full thickness (onset 2025) (Active)   Wound Image   25 1431   Wound Etiology Pressure Stage 1 25 1431   Dressing Status New dressing

## 2025-07-18 NOTE — PLAN OF CARE
Patient seen in [facility] as anew patient for coccyx wound. Patient with recent hospitalization. Patient presented to [facility] on 6/21/25 with left hip pain after a fall, found to have left hip fracture.  Orthopedic surgery was consulted and on 6/22 she underwent ORIF with prosthetic replacement. She was admitted to [facility] on 6/25/25 due to functional deficits below her baseline. Patient returned home on 7/5/25. Patient states coccyx wound has been present since hospitalization. Patient reports pain in wound. She states she has been unable to keep bandage in place on wound. Patient has been unable to offload wound r/t recent hip surgery. Patient has to sleep on her back, again r/t hip surgery. Wound is small is size, superficial. No debridement needed. Will plan to apply Triad and Duoderm. Supplies ordered from [employer] today. DuoDerm CGF ordered.  Patient supplied with offloading cushion in [facility]. Patient also given ordering information for inflatable mattress cover to aid in offloading.  F/u in [facility] in 2 week as ordered, pt. Aware to call sooner with any changes or questions/concerns. Discharge instructions reviewed with patient, all questions answered, copy given to patient. Dressings were applied to all wounds per M.D. Instructions at this visit.

## 2025-07-21 ENCOUNTER — HOSPITAL ENCOUNTER (OUTPATIENT)
Dept: PHYSICAL THERAPY | Age: 80
Setting detail: THERAPIES SERIES
Discharge: HOME | End: 2025-07-21
Payer: MEDICARE

## 2025-07-21 PROBLEM — M53.3 COCCYALGIA: Status: ACTIVE | Noted: 2025-07-21

## 2025-07-21 PROCEDURE — 97112 NEUROMUSCULAR REEDUCATION: CPT

## 2025-07-21 PROCEDURE — 97140 MANUAL THERAPY 1/> REGIONS: CPT

## 2025-07-21 PROCEDURE — 97110 THERAPEUTIC EXERCISES: CPT

## 2025-07-21 NOTE — FLOWSHEET NOTE
[facility] - Outpatient Rehabilitation and Therapy: [address], [place] [address] office: [phone] fax: [phone]           Physical Therapy: TREATMENT/PROGRESS NOTE   Patient: Lexi Benitez (79 y.o. female)   Examination Date: 2025   :  1945 MRN: [id number]   Visit #: 2   Insurance Allowable Auth Needed   ? []Yes    []No    Insurance: Payor: MEDICARE / Plan: MEDICARE PART A AND B / Product Type: *No Product type* /   Insurance ID: [id number] - (Medicare)  Secondary Insurance (if applicable): [place] BCBS   Treatment Diagnosis: L hip pain, s/p L TEN DOS 25  No diagnosis found.   Medical Diagnosis:  Left hip pain [M25.552]   Referring Physician: Dung Linares MD  PCP: Geoffrey Caceres DO     Plan of care signed (Y/N):     Date of Patient follow up with Physician:      Plan of Care Report: EVAL today and YES, Date Range for this report: 7/15/25 to 10/15/25  POC update due: (10 visits /OR AUTH LIMITS, whichever is less)  2025                                             Medical History:  Comorbidities:  Osteoarthritis  Relevant Medical History: na                                         Precautions/ Contra-indications:           Latex allergy:  NO  Pacemaker:    NO  Contraindications for Manipulation: None  Date of Surgery: 25  Other:    Red Flags:  None    Suicide Screening:   The patient did not verbalize a primary behavioral concern, suicidal ideation, suicidal intent, or demonstrate suicidal behaviors.    Preferred Language for Healthcare:   [x] English       [] other:    SUBJECTIVE EXAMINATION     Patient stated complaint: Pt reports her hip has been very painful.     The pt is 6 weeks post-op on 8/3/25.       Test used Initial score  7/15/25 2025   Pain Summary VAS 4-8 6   Functional questionnaire LEFS 28    Other:                Exercises/Interventions     Therapeutic Ex (05027)  resistance Sets/time Reps

## 2025-07-24 ENCOUNTER — TELEPHONE (OUTPATIENT)
Dept: ORTHOPEDIC SURGERY | Age: 80
End: 2025-07-24

## 2025-07-24 NOTE — TELEPHONE ENCOUNTER
Prescription Refill      Medication Name: hydrocodone  Pharmacy: [facility]   Patient Contact Number: [phone]    Please call patient to make aware once meds are sent

## 2025-07-25 ENCOUNTER — HOSPITAL ENCOUNTER (OUTPATIENT)
Dept: PHYSICAL THERAPY | Age: 80
Setting detail: THERAPIES SERIES
Discharge: HOME | End: 2025-07-25
Payer: MEDICARE

## 2025-07-25 PROCEDURE — 97110 THERAPEUTIC EXERCISES: CPT

## 2025-07-25 PROCEDURE — 97112 NEUROMUSCULAR REEDUCATION: CPT

## 2025-07-25 NOTE — FLOWSHEET NOTE
[facility] - Outpatient Rehabilitation and Therapy: [address], [place] [address] office: [phone] fax: [phone]           Physical Therapy: TREATMENT/PROGRESS NOTE   Patient: Lexi Benitez (79 y.o. female)   Examination Date: 2025   :  1945 MRN: [id number]   Visit #: 3   Insurance Allowable Auth Needed   ? []Yes    []No    Insurance: Payor: MEDICARE / Plan: MEDICARE PART A AND B / Product Type: *No Product type* /   Insurance ID: [id number] - (Medicare)  Secondary Insurance (if applicable): [place] BCBS   Treatment Diagnosis: L hip pain, s/p L TEN DOS 25  No diagnosis found.   Medical Diagnosis:  Left hip pain [M25.552]   Referring Physician: Dung Linares MD  PCP: Geoffrey Caceres DO     Plan of care signed (Y/N):     Date of Patient follow up with Physician:      Plan of Care Report: EVAL today and YES, Date Range for this report: 7/15/25 to 10/15/25  POC update due: (10 visits /OR AUTH LIMITS, whichever is less)  2025                                             Medical History:  Comorbidities:  Osteoarthritis  Relevant Medical History: na                                         Precautions/ Contra-indications:           Latex allergy:  NO  Pacemaker:    NO  Contraindications for Manipulation: None  Date of Surgery: 25  Other:    Red Flags:  None    Suicide Screening:   The patient did not verbalize a primary behavioral concern, suicidal ideation, suicidal intent, or demonstrate suicidal behaviors.    Preferred Language for Healthcare:   [x] English       [] other:    SUBJECTIVE EXAMINATION     Patient stated complaint: Pt reports her pain remains high.     The pt is 6 weeks post-op on 8/3/25.       Test used Initial score  7/15/25 2025   Pain Summary VAS 4-8 6   Functional questionnaire LEFS 28    Other:                Exercises/Interventions     Therapeutic Ex (39418)  resistance Sets/time Reps Notes/Cues/Progressions

## 2025-07-29 ENCOUNTER — HOSPITAL ENCOUNTER (OUTPATIENT)
Dept: PHYSICAL THERAPY | Age: 80
Setting detail: THERAPIES SERIES
Discharge: HOME | End: 2025-07-29
Payer: MEDICARE

## 2025-07-29 PROCEDURE — 97112 NEUROMUSCULAR REEDUCATION: CPT

## 2025-07-29 PROCEDURE — 97110 THERAPEUTIC EXERCISES: CPT

## 2025-07-29 NOTE — FLOWSHEET NOTE
[facility] - Outpatient Rehabilitation and Therapy: [address], [place] [address] office: [phone] fax: [phone]           Physical Therapy: TREATMENT/PROGRESS NOTE   Patient: Lexi Benitez (79 y.o. female)   Examination Date: 2025   :  1945 MRN: [id number]   Visit #: 4   Insurance Allowable Auth Needed   ? []Yes    []No    Insurance: Payor: MEDICARE / Plan: MEDICARE PART A AND B / Product Type: *No Product type* /   Insurance ID: [id number] - (Medicare)  Secondary Insurance (if applicable): [place] BCBS   Treatment Diagnosis: L hip pain, s/p L TEN DOS 25  No diagnosis found.   Medical Diagnosis:  Left hip pain [M25.552]   Referring Physician: Dung Linares MD  PCP: Geoffrey Caceres DO     Plan of care signed (Y/N):     Date of Patient follow up with Physician:      Plan of Care Report: EVAL today and YES, Date Range for this report: 7/15/25 to 10/15/25  POC update due: (10 visits /OR AUTH LIMITS, whichever is less)  2025                                             Medical History:  Comorbidities:  Osteoarthritis  Relevant Medical History: na                                         Precautions/ Contra-indications:           Latex allergy:  NO  Pacemaker:    NO  Contraindications for Manipulation: None  Date of Surgery: 25  Other:    Red Flags:  None    Suicide Screening:   The patient did not verbalize a primary behavioral concern, suicidal ideation, suicidal intent, or demonstrate suicidal behaviors.    Preferred Language for Healthcare:   [x] English       [] other:    SUBJECTIVE EXAMINATION     Patient stated complaint: Pt reports her hip remains painful.       The pt is 6 weeks post-op on 8/3/25.       Test used Initial score  7/15/25 2025   Pain Summary VAS 4-8 5   Functional questionnaire LEFS 28    Other:                Exercises/Interventions     Therapeutic Ex (23105)  resistance Sets/time Reps

## 2025-07-30 NOTE — DISCHARGE INSTRUCTIONS
Wound Care Center Physician Orders and Discharge Instructions  [place] [address], [address]  [place] [id number]  Telephone: [phone]      Fax: [phone]        Your home care company:    .     Your wound-care supplies will be provided by: We are ordering your wound-care supplies from [employer]. Please note, depending on your insurance coverage, you may have out-of-pocket expenses for these supplies. Someone from [employer] may call you to confirm your order and discuss those potential costs before they ship your products -- please anticipate that call. If your out-of-pocket cost is going to be less than $150, and [employer] cannot reach you, they may ship your supplies as soon as the order is processed, and will send you a bill. If your out-of-pocket cost is going to be more than $150, [employer] should not ship your supplies until they speak with you. If you have any questions about your supplies or your potential out-of-pocket costs, or if you need to place an order for a refill of supplies (typically monthly), [employer] can be reached at [phone]. Information on [employer] return policy will also be enclosed with your supplies -- please read that carefully before opening your items.         NAME:  Lexi Benitez   YOB: 1945  PRIMARY DIAGNOSIS FOR WOUND CARE CENTER:  Pressure Ulcer .     Wound cleansing:   Do not scrub or use excessive force.  Wash hands with soap and water before and after dressing changes.  Prior to applying a clean dressing, cleanse wound with normal saline, wound cleanser, or mild soap and water. Ask your physician or nurse before getting the wound(s) wet in the shower.                Wound care for home:     Coccyx - No dressing needed (ok to place a small dressing if it feels better)     Please note, all wounds (unless stated otherwise here) were mechanically debrided at the time of cleansing here in the

## 2025-07-31 ENCOUNTER — HOSPITAL ENCOUNTER (OUTPATIENT)
Dept: WOUND CARE | Age: 80
Discharge: HOME | End: 2025-07-31
Attending: INTERNAL MEDICINE
Payer: MEDICARE

## 2025-07-31 VITALS
BODY MASS INDEX: 20.69 KG/M2 | WEIGHT: 131.8 LBS | HEART RATE: 96 BPM | RESPIRATION RATE: 18 BRPM | SYSTOLIC BLOOD PRESSURE: 135 MMHG | HEIGHT: 67 IN | TEMPERATURE: 97.9 F | DIASTOLIC BLOOD PRESSURE: 73 MMHG
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
## 2025-07-31 DIAGNOSIS — S72.002A CLOSED LEFT HIP FRACTURE, INITIAL ENCOUNTER (HCC): Primary | ICD-10-CM

## 2025-07-31 DIAGNOSIS — L89.152 PRESSURE ULCER OF COCCYGEAL REGION, STAGE 2 (HCC): ICD-10-CM
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
## 2025-07-31 PROCEDURE — 99212 OFFICE O/P EST SF 10 MIN: CPT

## 2025-07-31 RX ORDER — LIDOCAINE HYDROCHLORIDE 40 MG/ML
SOLUTION TOPICAL PRN
Status: DISCONTINUED | OUTPATIENT
Start: 2025-07-31 | End: 2025-08-01 | Stop reason: HOSPADM

## 2025-07-31 RX ORDER — ACETAMINOPHEN 500 MG
500 TABLET ORAL EVERY 6 HOURS PRN
COMMUNITY

## 2025-07-31 RX ORDER — IBUPROFEN 200 MG
200 TABLET ORAL EVERY 6 HOURS PRN
COMMUNITY

## 2025-07-31 RX ORDER — TRIAMCINOLONE ACETONIDE 1 MG/G
OINTMENT TOPICAL PRN
Status: DISCONTINUED | OUTPATIENT
Start: 2025-07-31 | End: 2025-08-01 | Stop reason: HOSPADM

## 2025-07-31 RX ORDER — LIDOCAINE 40 MG/G
CREAM TOPICAL PRN
Status: DISCONTINUED | OUTPATIENT
Start: 2025-07-31 | End: 2025-08-01 | Stop reason: HOSPADM

## 2025-07-31 RX ORDER — LIDOCAINE HYDROCHLORIDE 40 MG/ML
SOLUTION TOPICAL PRN
OUTPATIENT
Start: 2025-07-31

## 2025-07-31 RX ORDER — LIDOCAINE 50 MG/G
OINTMENT TOPICAL PRN
OUTPATIENT
Start: 2025-07-31

## 2025-07-31 RX ORDER — BACITRACIN ZINC AND POLYMYXIN B SULFATE 500; 1000 [USP'U]/G; [USP'U]/G
OINTMENT TOPICAL PRN
Status: DISCONTINUED | OUTPATIENT
Start: 2025-07-31 | End: 2025-08-01 | Stop reason: HOSPADM

## 2025-07-31 RX ORDER — LIDOCAINE 40 MG/G
CREAM TOPICAL PRN
OUTPATIENT
Start: 2025-07-31

## 2025-07-31 RX ORDER — TRIAMCINOLONE ACETONIDE 1 MG/G
OINTMENT TOPICAL PRN
OUTPATIENT
Start: 2025-07-31

## 2025-07-31 RX ORDER — BACITRACIN ZINC AND POLYMYXIN B SULFATE 500; 1000 [USP'U]/G; [USP'U]/G
OINTMENT TOPICAL PRN
OUTPATIENT
Start: 2025-07-31

## 2025-07-31 RX ORDER — LIDOCAINE 50 MG/G
OINTMENT TOPICAL PRN
Status: DISCONTINUED | OUTPATIENT
Start: 2025-07-31 | End: 2025-08-01 | Stop reason: HOSPADM

## 2025-07-31 ASSESSMENT — PAIN DESCRIPTION - DESCRIPTORS: DESCRIPTORS: PENETRATING

## 2025-07-31 ASSESSMENT — PAIN DESCRIPTION - FREQUENCY: FREQUENCY: CONTINUOUS

## 2025-07-31 ASSESSMENT — PAIN SCALES - GENERAL: PAINLEVEL_OUTOF10: 5

## 2025-07-31 ASSESSMENT — PAIN DESCRIPTION - LOCATION: LOCATION: BUTTOCKS

## 2025-07-31 ASSESSMENT — PAIN DESCRIPTION - PAIN TYPE: TYPE: ACUTE PAIN

## 2025-07-31 ASSESSMENT — PAIN - FUNCTIONAL ASSESSMENT: PAIN_FUNCTIONAL_ASSESSMENT: ACTIVITIES ARE NOT PREVENTED

## 2025-07-31 NOTE — PLAN OF CARE
Pt to the [facility] for wound assessment. Coccyx wound healed. Re-iterated offloading/fragile skin, pt/spouse verbalized understanding. Pt ordered offloading cushion online. Pt to continue with documented plan of care and to follow up in the [facility] as needed. Pt. aware to call sooner with any problems or questions/concerns. MD orders and D/C instructions reviewed, pt verbalized understanding.

## 2025-08-01 ENCOUNTER — HOSPITAL ENCOUNTER (OUTPATIENT)
Dept: PHYSICAL THERAPY | Age: 80
Setting detail: THERAPIES SERIES
Discharge: HOME | End: 2025-08-01
Payer: MEDICARE

## 2025-08-01 PROCEDURE — 97110 THERAPEUTIC EXERCISES: CPT

## 2025-08-01 PROCEDURE — 97112 NEUROMUSCULAR REEDUCATION: CPT

## 2025-08-01 PROCEDURE — 97140 MANUAL THERAPY 1/> REGIONS: CPT

## 2025-08-01 NOTE — FLOWSHEET NOTE
[facility] - Outpatient Rehabilitation and Therapy: [address], [place] [address] office: [phone] fax: [phone]           Physical Therapy: TREATMENT/PROGRESS NOTE   Patient: Lexi Benitez (79 y.o. female)   Examination Date: 2025   :  1945 MRN: [id number]   Visit #: 5   Insurance Allowable Auth Needed   ? []Yes    []No    Insurance: Payor: MEDICARE / Plan: MEDICARE PART A AND B / Product Type: *No Product type* /   Insurance ID: [id number] - (Medicare)  Secondary Insurance (if applicable): [place] BCBS   Treatment Diagnosis: L hip pain, s/p L TEN DOS 25  No diagnosis found.   Medical Diagnosis:  Left hip pain [M25.552]   Referring Physician: Dung Linares MD  PCP: Geoffrey Caceres DO     Plan of care signed (Y/N):     Date of Patient follow up with Physician:      Plan of Care Report: EVAL today and YES, Date Range for this report: 7/15/25 to 10/15/25  POC update due: (10 visits /OR AUTH LIMITS, whichever is less)  2025                                             Medical History:  Comorbidities:  Osteoarthritis  Relevant Medical History: na                                         Precautions/ Contra-indications:           Latex allergy:  NO  Pacemaker:    NO  Contraindications for Manipulation: None  Date of Surgery: 25  Other:    Red Flags:  None    Suicide Screening:   The patient did not verbalize a primary behavioral concern, suicidal ideation, suicidal intent, or demonstrate suicidal behaviors.    Preferred Language for Healthcare:   [x] English       [] other:    SUBJECTIVE EXAMINATION     Patient stated complaint: Pt reports her pain has been decreasing.         The pt is 6 weeks post-op on 8/3/25.       Test used Initial score  7/15/25 2025   Pain Summary VAS 4-8 4   Functional questionnaire LEFS 28    Other:                Exercises/Interventions     Therapeutic Ex (78858)  resistance Sets/time Reps

## 2025-08-04 ENCOUNTER — OFFICE VISIT (OUTPATIENT)
Dept: ORTHOPEDIC SURGERY | Age: 80
End: 2025-08-04

## 2025-08-04 VITALS — BODY MASS INDEX: 20.69 KG/M2 | OXYGEN SATURATION: 95 % | HEART RATE: 89 BPM | HEIGHT: 67 IN | WEIGHT: 131.84 LBS

## 2025-08-04 DIAGNOSIS — M25.552 LEFT HIP PAIN: Primary | ICD-10-CM

## 2025-08-04 DIAGNOSIS — Z96.649 S/P HIP HEMIARTHROPLASTY: ICD-10-CM

## 2025-08-04 PROCEDURE — 99024 POSTOP FOLLOW-UP VISIT: CPT | Performed by: ORTHOPAEDIC SURGERY

## 2025-08-04 RX ORDER — AMOXICILLIN 500 MG/1
2000 TABLET, FILM COATED ORAL PRN
Qty: 4 TABLET | Refills: 0 | Status: SHIPPED | OUTPATIENT
Start: 2025-08-04

## 2025-08-05 ENCOUNTER — HOSPITAL ENCOUNTER (OUTPATIENT)
Dept: PHYSICAL THERAPY | Age: 80
Setting detail: THERAPIES SERIES
Discharge: HOME | End: 2025-08-05
Payer: MEDICARE

## 2025-08-05 PROCEDURE — 97112 NEUROMUSCULAR REEDUCATION: CPT

## 2025-08-05 PROCEDURE — 97110 THERAPEUTIC EXERCISES: CPT

## 2025-08-08 ENCOUNTER — HOSPITAL ENCOUNTER (OUTPATIENT)
Dept: PHYSICAL THERAPY | Age: 80
Setting detail: THERAPIES SERIES
Discharge: HOME | End: 2025-08-08
Payer: MEDICARE

## 2025-08-08 PROCEDURE — 97112 NEUROMUSCULAR REEDUCATION: CPT

## 2025-08-08 PROCEDURE — 97110 THERAPEUTIC EXERCISES: CPT

## 2025-08-10 PROBLEM — L89.152 PRESSURE ULCER OF COCCYGEAL REGION, STAGE 2 (HCC): Status: RESOLVED | Noted: 2025-07-18 | Resolved: 2025-08-10

## 2025-08-10 PROBLEM — M53.3 COCCYALGIA: Status: RESOLVED | Noted: 2025-07-21 | Resolved: 2025-08-10

## 2025-08-19 ENCOUNTER — HOSPITAL ENCOUNTER (OUTPATIENT)
Dept: PHYSICAL THERAPY | Age: 80
Setting detail: THERAPIES SERIES
Discharge: HOME | End: 2025-08-19
Payer: MEDICARE

## 2025-08-19 PROCEDURE — 97110 THERAPEUTIC EXERCISES: CPT

## 2025-08-19 PROCEDURE — 97112 NEUROMUSCULAR REEDUCATION: CPT

## 2025-08-22 ENCOUNTER — APPOINTMENT (OUTPATIENT)
Dept: PHYSICAL THERAPY | Age: 80
End: 2025-08-22
Payer: MEDICARE

## 2025-08-26 ENCOUNTER — HOSPITAL ENCOUNTER (OUTPATIENT)
Dept: PHYSICAL THERAPY | Age: 80
Setting detail: THERAPIES SERIES
Discharge: HOME | End: 2025-08-26
Payer: MEDICARE

## 2025-08-26 PROCEDURE — 97110 THERAPEUTIC EXERCISES: CPT

## 2025-08-26 PROCEDURE — 97112 NEUROMUSCULAR REEDUCATION: CPT

## 2025-08-29 ENCOUNTER — HOSPITAL ENCOUNTER (OUTPATIENT)
Dept: PHYSICAL THERAPY | Age: 80
Setting detail: THERAPIES SERIES
Discharge: HOME | End: 2025-08-29
Payer: MEDICARE

## 2025-08-29 PROCEDURE — 97112 NEUROMUSCULAR REEDUCATION: CPT

## 2025-08-29 PROCEDURE — 97110 THERAPEUTIC EXERCISES: CPT

## 2025-09-05 ENCOUNTER — TELEPHONE (OUTPATIENT)
Dept: ORTHOPEDIC SURGERY | Age: 80
End: 2025-09-05

## 2025-09-05 ENCOUNTER — HOSPITAL ENCOUNTER (OUTPATIENT)
Dept: PHYSICAL THERAPY | Age: 80
Setting detail: THERAPIES SERIES
Discharge: HOME | End: 2025-09-05
Payer: MEDICARE

## 2025-09-05 PROCEDURE — 97110 THERAPEUTIC EXERCISES: CPT

## 2025-09-05 PROCEDURE — 97112 NEUROMUSCULAR REEDUCATION: CPT

## (undated) DEVICE — SUTURE VICRYL + SZ 2-0 L36IN ABSRB UD L36MM CT-1 1/2 CIR VCP945H

## (undated) DEVICE — DRAPE,U/ SHT,SPLIT,PLAS,STERIL: Brand: MEDLINE

## (undated) DEVICE — SOLUTION IV 250ML 0.9% SOD CHL PH 5 INJ USP VIAFLX PLAS

## (undated) DEVICE — NDL CNTR 40CT FM MAG: Brand: MEDLINE INDUSTRIES, INC.

## (undated) DEVICE — PADDING,UNDERCAST,COTTON, 4"X4YD STERILE: Brand: MEDLINE

## (undated) DEVICE — SUTURE VICRYL SZ 2-0 L27IN ABSRB UD L26MM CT-2 1/2 CIR J269H

## (undated) DEVICE — GLOVE SURG SZ 8 CRM LTX FREE POLYISOPRENE POLYMER BEAD ANTI

## (undated) DEVICE — SUTURE VICRYL ABSRB BRAID COAT UD CP NO 2 27IN  J195H

## (undated) DEVICE — ELECTRODE,ECG,STRESS,FOAM,3PK: Brand: MEDLINE

## (undated) DEVICE — SST TWIST DRILL, AO TYPE, 2.7MM DIA. X 75MM: Brand: MICROAIRE®

## (undated) DEVICE — PILLOW POS W15XH6XL22IN RASPBERRY FOAM ABD W/ STRP DISP FOR

## (undated) DEVICE — STRIP WND CLSR W1 4XL4IN POLYAMIDE MACROPOROUS NONWOVEN H2O

## (undated) DEVICE — BLADE,CARBON-STEEL,15,STRL,DISPOSABLE,TB: Brand: MEDLINE

## (undated) DEVICE — DRESSING PETRO W3XL8IN OIL EMUL N ADH GZ KNIT IMPREG CELOS

## (undated) DEVICE — 3M™ STERI-DRAPE™ U-DRAPE 1015: Brand: STERI-DRAPE™

## (undated) DEVICE — Device

## (undated) DEVICE — SUTURE VICRYL SZ 2 L27IN ABSRB VLT L65MM TP-1 1/2 CIR J649G

## (undated) DEVICE — INTENDED TO AID IN THE PASSING OF SUTURES THROUGH BONE AND SOFT TISSUE DURING ORTHOPEDIC SURGERY: Brand: HOFFEE SUTURE RETRIEVER

## (undated) DEVICE — FORCEPS BX L240CM JAW DIA2.8MM L CAP W/ NDL MIC MESH TOOTH

## (undated) DEVICE — SMARTGOWN SURGICAL GOWN, 3XL, LONG: Brand: CONVERTORS

## (undated) DEVICE — ENDOSCOPIC KIT 2 12 FT OP4 DE2 GWN SYR

## (undated) DEVICE — SUTURE MONOCRYL SZ 3-0 L27IN ABSRB UD L24MM PS-1 3/8 CIR PRIM Y936H

## (undated) DEVICE — CONMED SCOPE SAVER BITE BLOCK, 20X27 MM: Brand: SCOPE SAVER

## (undated) DEVICE — SUTURE VICRYL + SZ 1 L27IN ABSRB VLT L36MM CT-1 1/2 CIR VCP341H

## (undated) DEVICE — SOLUTION IRRIG 3000ML 0.9% SOD CHL USP UROMATIC PLAS CONT

## (undated) DEVICE — CANNULA NSL AD TBNG L7FT PVC STR NONFLARED PRNG O2 DEL W STD

## (undated) DEVICE — 2108 SERIES SAGITTAL BLADE, NO OFFSET  (18.6 X 1.24 X 105MM)

## (undated) DEVICE — GAUZE,SPONGE,FLUFF,6"X6.75",STRL,5/TRAY: Brand: MEDLINE

## (undated) DEVICE — GLOVE SURG SZ 85 L12IN FNGR THK79MIL GRN LTX FREE

## (undated) DEVICE — SUTURE ETHIBOND EXCEL SZ 5 L30IN NONABSORBABLE GRN L40MM V-37 MB66G

## (undated) DEVICE — 3M™ STERI-DRAPE™ INCISE DRAPE 1050 (60CM X 45CM): Brand: STERI-DRAPE™

## (undated) DEVICE — MARKER,SKIN,WI/RULER AND LABELS: Brand: MEDLINE

## (undated) DEVICE — 3M™ IOBAN™ 2 ANTIMICROBIAL INCISE DRAPE 6650EZ: Brand: IOBAN™ 2

## (undated) DEVICE — HANDPIECE SET WITH HIGH FLOW TIP AND SUCTION TUBE: Brand: INTERPULSE

## (undated) DEVICE — SUTURE STRATAFIX SYMMETRIC PDS + SZ 1 L18IN ABSRB VLT L48MM SXPP1A400

## (undated) DEVICE — GUIDEWIRE WITH SPRING TIP - SINGLE USE: Brand: SAFEGUIDE®

## (undated) DEVICE — SUTURE VICRYL SZ 0 L36IN ABSRB UD CT-1 L36MM 1/2 CIR TAPR PNT VCP946H

## (undated) DEVICE — SUTURE VICRYL SZ 0 L36IN ABSRB UD L36MM CT-1 1/2 CIR J946H

## (undated) DEVICE — TOWEL,OR,DSP,ST,BLUE,STD,4/PK,20PK/CS: Brand: MEDLINE